# Patient Record
Sex: FEMALE | Race: BLACK OR AFRICAN AMERICAN | NOT HISPANIC OR LATINO | Employment: FULL TIME | URBAN - METROPOLITAN AREA
[De-identification: names, ages, dates, MRNs, and addresses within clinical notes are randomized per-mention and may not be internally consistent; named-entity substitution may affect disease eponyms.]

---

## 2017-03-25 ENCOUNTER — HOSPITAL ENCOUNTER (EMERGENCY)
Facility: HOSPITAL | Age: 22
Discharge: HOME | End: 2017-03-25
Payer: MEDICAID

## 2017-03-25 VITALS
WEIGHT: 140 LBS | HEIGHT: 65 IN | DIASTOLIC BLOOD PRESSURE: 79 MMHG | HEART RATE: 86 BPM | SYSTOLIC BLOOD PRESSURE: 130 MMHG | TEMPERATURE: 98 F | RESPIRATION RATE: 18 BRPM | BODY MASS INDEX: 23.32 KG/M2 | OXYGEN SATURATION: 94 %

## 2017-03-25 DIAGNOSIS — J40 BRONCHITIS: Primary | ICD-10-CM

## 2017-03-25 DIAGNOSIS — J02.9 PHARYNGITIS, UNSPECIFIED ETIOLOGY: ICD-10-CM

## 2017-03-25 DIAGNOSIS — J32.9 SINUSITIS, UNSPECIFIED CHRONICITY, UNSPECIFIED LOCATION: ICD-10-CM

## 2017-03-25 PROCEDURE — 99283 EMERGENCY DEPT VISIT LOW MDM: CPT

## 2017-03-25 NOTE — ED AVS SNAPSHOT
[facility] - [address] [address]               Marcella Duke   3/25/2017 10:54 PM   ED    Description:  Female : 1995   Department:  [facility]           Your Care was Coordinated By:     Provider Role From To    Juliano Murphy PA-C Physician Assistant [phone] --      Reason for Visit     Cough           Diagnoses this Visit        Comments    Bronchitis    -  Primary     Sinusitis, unspecified chronicity, unspecified location         Pharyngitis, unspecified etiology           ED Disposition     ED Disposition Condition Comment    Discharge             To Do List           Follow-up Information     Follow up with [facility].    Specialty:  Emergency Medicine    Why:  If symptoms worsen in any way. Take Azithromycin and Steroid as prescribed. Take Tylenol as directed for fever or pain. Follow up with your primary care physician in the next 2-3 days for re-evaluation and further management.     Contact information:    [address] [place] [id number] [phone]      [facility]     [facility] -  Assistance  Registered nurses in the [facility] provide clinical advisement, health education, appointment booking, and other advisory services.  Call for this free service at [phone].             Medications           Message regarding Medications     Verify the changes and/or additions to your medication regime listed below are the same as discussed with your clinician today.  If any of these changes or additions are incorrect, please notify your healthcare provider.             Verify that the below list of medications is an accurate representation of the medications you are currently taking.  If none reported, the list may be blank. If incorrect, please contact your healthcare provider. Carry this list with you in case of  "emergency.           Current Medications     albuterol 90 mcg/actuation inhaler Inhale 2 puffs into the lungs every 6 (six) hours as needed for Wheezing.    norelgestromin-ethinyl estradiol (ORTHO EVRA) 150-35 mcg/24 hr Place 1 patch onto the skin every 7 days.           Clinical Reference Information           Your Vitals Were     BP Pulse Temp Resp Height Weight    130/79 (BP Location: Right arm, Patient Position: Sitting) 86 97.9 °F (36.6 °C) (Oral) 18 5' 5" (1.651 m) 63.5 kg (140 lb)    Last Period SpO2 BMI          03/22/2017 (Exact Date) 94% 23.3 kg/m2        Allergies as of 3/25/2017     No Known Allergies      Immunizations Administered on Date of Encounter - 3/25/2017     None      ED Micro, Lab, POCT     None      ED Imaging Orders     None        Discharge Instructions         What Is Acute Bronchitis?  Acute or short-term bronchitis last for days or weeks. It occurs when the bronchial tubes (airways in the lungs) are irritated by a virus, bacteria, or allergen. This causes a cough that produces yellow or greenish mucus.  Inside healthy lungs    Air travels in and out of the lungs through the airways. The linings of these airways produce sticky mucus. This mucus traps particles that enter the lungs. Tiny structures called cilia then sweep the particles out of the airways.     Healthy airway: Airways are normally open. Air moves in and out easily.      Healthy cilia: Tiny, hairlike cilia sweep mucus and particles up and out of the airways.   Lungs with bronchitis  Bronchitis often occurs with a cold or the flu virus. The airways become inflamed (red and swollen). There is a deep hacking cough from the extra mucus. Other symptoms may include:  · Wheezing  · Chest discomfort  · Shortness of breath  · Mild fever  A second infection, this time due to bacteria, may then occur. And airways irritated by allergens or smoke are more likely to get infected.        Inflamed airway: Inflammation and extra mucus " narrow the airway, causing shortness of breath.      Impaired cilia: Extra mucus impairs cilia, causing congestion and wheezing. Smoking makes the problem worse.   Making a diagnosis  A physical exam, health history, and certain tests help your healthcare provider make the diagnosis.  Health history  Your healthcare provider will ask you about your symptoms.  The exam  Your provider listens to your chest for signs of congestion. He or she may also check your ears, nose, and throat.  Possible tests  · A sputum test for bacteria. This requires a sample of mucus from the lungs.  · A nasal or throat swab for bacterial infection.  · A chest X-ray if your healthcare provider thinks you have pneumonia.  · Tests to check for an underlying condition, such as allergies, asthma, or COPD. You may need to see a specialist for more lung function testing.  Treating a cough  The main treatment for bronchitis is easing symptoms. Avoiding smoke, allergens, and other things that trigger coughing can often help. If the infection is bacterial, you may be given antibiotics. During the illness, it's important to get plenty of sleep. To ease symptoms:  · Dont smoke, and avoid secondhand smoke.  · Use a humidifier, or breathe in steam from a hot shower. This may help loosen mucus.  · Drink a lot of water and juice. They can soothe the throat and may help thin mucus.  · Sit up or use extra pillows when in bed to help lessen coughing and congestion.  · Ask your provider about using cough medicine, pain and fever medicine, or a decongestant.  Antibiotics  Most cases of bronchitis are caused by cold or flu viruses. Antibiotics dont treat viral illness. Taking antibiotics when they are not needed increases your risk of getting an infection later that is antibiotic-resistant. Your provider will prescribe antibiotics if the infection is caused by bacteria. If they are prescribed:  · Take the medicine until it is used up, even if symptoms have  improved. If you dont, the bronchitis may come back.  · Take them as directed. For instance, some medicines should be taken with food.  · Ask your provider or pharmacist what side effects are common, and what to do about them.  Follow-up care  You should see your provider again in 2 to 3 weeks. By this time, symptoms should have improved. An infection that lasts longer may mean you have a more serious problem.  Prevention  · Avoid tobacco smoke. If you smoke, quit. Stay away from smoky places. Ask friends and family not to smoke around you, or in your home or car.  · Get checked for allergies.  · Ask your provider about getting a yearly flu shot, and pneumococcal or pneumonia shots.  · Wash your hands often. This helps reduce the chance of picking up viruses that cause colds and flu.  Call your healthcare provider if:  · Symptoms worsen, or new symptoms develop.  · Breathing problems worsen or  become severe.  · Symptoms dont get better within a week, or within 3 days of taking antibiotics.   Date Last Reviewed: 6/18/2014  © 5571-5550 [employer]. [address] [place] [address]. All rights reserved. This information is not intended as a substitute for professional medical care. Always follow your healthcare professional's instructions.          Self-Care for Sore Throats  Sore throats happen for many reasons, such as colds, allergies, and infections caused by viruses or bacteria. In any case, your throat becomes red and sore. Your goal for self-care is to reduce your discomfort while giving your throat a chance to heal.    Moisten and soothe your throat  Tips include the following:  · Try a sip of water first thing after waking up.  · Keep your throat moist by drinking 6 or more glasses of clear liquids every day.  · Run a cool-air humidifier in your room overnight.  · Avoid cigarette smoke.   · Suck on throat lozenges, cough drops, hard candy, ice chips, or frozen fruit-juice bars. Use  the sugar-free versions if your diet or medical condition requires them.  Gargle to ease irritation  Gargling every hour or 2 can ease irritation. Try gargling with 1 of these solutions:  · 1/4 teaspoon of salt in 1/2 cup of warm water  · An over-the-counter anesthetic gargle  Use medicine for more relief  Over-the-counter medicine can reduce sore throat symptoms. Ask your pharmacist if you have questions about which medicine to use:  · Ease pain with anesthetic sprays. Aspirin or an aspirin substitute also helps. Remember, never give aspirin to anyone 18 or younger, or if you are already taking blood thinners.   · For sore throats caused by allergies, try antihistamines to block the allergic reaction.  · Remember: unless a sore throat is caused by a bacterial infection, antibiotics wont help you.  Prevent future sore throats  Prevention tips include the following:  · Stop smoking or reduce contact with secondhand smoke. Smoke irritates the tender throat lining.  · Limit contact with pets and with allergy-causing substances, such as pollen and mold.  · When youre around someone with a sore throat or cold, wash your hands often to keep viruses or bacteria from spreading.  · Dont strain your vocal cords.  Call your healthcare provider  Contact your healthcare provider if you have:  · A temperature over 101°F (38.3°C)  · White spots on the throat  · Great difficulty swallowing  · Trouble breathing  · A skin rash  · Recent exposure to someone else with strep bacteria  · Severe hoarseness and swollen glands in the neck or jaw   Date Last Reviewed: 8/1/2016  © 8677-1793 [employer]. [address] [place] [address], [place] [address]. All rights reserved. This information is not intended as a substitute for professional medical care. Always follow your healthcare professional's instructions.          When You Have a Sore Throat  A sore throat can be painful. There are many reasons why you may have a sore throat.  Your healthcare provider will work with you to find the cause of your sore throat. He or she will also find the best treatment for you.      What causes a sore throat?  Sore throats can be caused or worsened by:  · Cold or flu viruses  · Bacteria  · Irritants such as tobacco smoke or air pollution  · Acid reflux  A healthy throat  The tonsils are on the sides of the throat near the base of the tongue. They collect viruses and bacteria and help fight infection. The throat (pharynx) is the passage for air. Mucus from the nasal cavity also moves down the passage.  An inflamed throat  The tonsils and pharynx can become inflamed due to a cold or flu virus. Postnasal drip (excess mucus draining from the nasal cavity) can irritate the throat. It can also make the throat or tonsils more likely to be infected by bacteria. Severe, untreated tonsillitis in children or adults can cause a pocket of pus (abscess) to form near the tonsil.  Your evaluation  A medical evaluation can help find the cause of your sore throat. It can also help your healthcare provider choose the best treatment for you. The evaluation may include a health history, physical exam, and diagnostic tests.  Health history  Your healthcare provider may ask you the following:  · How long has the sore throat lasted and how have you been treating it?  · Do you have any other symptoms, such as body aches, fever, or cough?  · Does your sore throat recur? If so, how often? How many days of school or work have you missed because of a sore throat?  · Do you have trouble eating or swallowing?  · Have you been told that you snore or have other sleep problems?  · Do you have bad breath?  · Do you cough up bad-tasting mucus?  Physical exam  During the exam, your healthcare provider checks your ears, nose, and throat for problems. He or she also checks for swelling in the neck, and may listen to your chest.  Possible tests  Other tests your healthcare provider may perform  "include:  · A throat swab to check for bacteria such as streptococcus (the bacteria that causes strep throat)  · A blood test to check for mononucleosis (a viral infection)  · A chest X-ray to rule out pneumonia, especially if you have a cough  Treating a sore throat  Treatment depends on many factors. What is the likely cause? Is the problem recent? Does it keep coming back? In many cases, the best thing to do is to treat the symptoms, rest, and let the problem heal itself. Antibiotics may help clear up some bacterial infections. For cases of severe or recurring tonsillitis, the tonsils may need to be removed.  Relieving your symptoms  · Dont smoke, and avoid secondhand smoke.  · For children, try throat sprays or Popsicles. Adults and older children may try lozenges.  · Drink warm liquids to soothe the throat and help thin mucus. Avoid alcohol, spicy foods, and acidic drinks such as orange juice. These can irritate the throat.  · Gargle with warm saltwater (1 teaspoon of salt to 8 ounces of warm water).  · Use a humidifier to keep air moist and relieve throat dryness.  · Try over-the-counter pain relievers such as acetaminophen or ibuprofen. Use as directed, and dont exceed the recommended dose. Dont give aspirin to children.   Are antibiotics needed?  If your sore throat is due to a bacterial infection, antibiotics may speed healing and prevent complications. Although group A streptococcus ("strep throat" or GAS) is the major treatable infection for a sore throat, GAS causes only 5% to 15% of sore throats in adults who seek medical care. Most sore throats are caused by cold or flu viruses. And antibiotics dont treat viral illness. In fact, using antibiotics when theyre not needed may produce bacteria that are harder to kill. Your healthcare provider will prescribe antibiotics only if he or she thinks they are likely to help.  If antibiotics are prescribed  Take the medicine exactly as directed. Be sure to " finish your prescription even if youre feeling better. And be sure to ask your healthcare provider or pharmacist what side effects are common and what to do about them.  Is surgery needed?  In some cases, tonsils need to be removed. This is often done as outpatient (same-day) surgery. Your healthcare provider may advise removing the tonsils in cases of:  · Several severe bouts of tonsillitis in a year. Severe episodes include those that lead to missed days of school or work, or that need to be treated with antibiotics.  · Tonsillitis that causes breathing problems during sleep  · Tonsillitis caused by food particles collecting in pouches in the tonsils (cryptic tonsillitis)  Call your healthcare provider if any of the following occur:  · Symptoms worsen, or new symptoms develop.  · Swollen tonsils make breathing difficult.  · The pain is severe enough to keep you from drinking liquids.  · A skin rash, hives, or wheezing develops. Any of these could signal an allergic reaction to antibiotics.  · Symptoms dont improve within a week.  · Symptoms dont improve within 2 to 3 days of starting antibiotics.   Date Last Reviewed: 10/1/2016  © 6488-0466 [employer]. [address] [place] [address]. All rights reserved. This information is not intended as a substitute for professional medical care. Always follow your healthcare professional's instructions.          Causes of Sinusitis    Mucus helps keep your sinuses clean. But mucus may build up in the sinuses because of colds, allergies, or blockages. These things get in the way of the natural drainage of mucus. This may lead to sinusitis. Sinusitis means sinus inflammation and infection.  · Acute sinusitis comes on suddenly. It often happens right after an upper respiratory infection, such as a cold. Viruses cause most acute sinus infections.  · Chronic sinusitis is ongoing swelling of the sinus lining. Doctors don't know what causes chronic  sinusitis.  Colds and other infections  A cold or flu may cause your sinus and nasal linings to swell. Sinus openings can become blocked. This causes mucus to back up. This backed-up mucus becomes an ideal place for bacteria to grow. Thick, yellow, or discolored mucus is one sign of infection.  Allergic reactions  You may be sensitive to certain substances. This causes the release of histamine in the body. Histamine makes your sinus and nasal linings swell. Long-term swelling clogs your sinuses. It prevents the tiny hairs (cilia) in the nasal lining from sweeping away mucus. Allergy symptoms can continue over time. But theyre less severe than with colds.    Blockages  · A polyp is a sac of swollen tissue. It can be the result of an allergy or infection. It may block the opening where most of your sinuses drain (middle meatus). It may even grow large enough to block your nose.  · A deviated septum is when the thin wall inside your nose is pushed to one side. It is often the result of injury. This can block your middle meatus.  People with chronic nasal problems or allergies are more likely to get acute sinusitis. Sinusitis is also more common if you have a weakened immune system, such as with HIV. You are also more likely to get sinusitis if you have cystic fibrosis or another condition that causes your body to make extra mucus.  Date Last Reviewed: 10/1/2016  © 6703-9817 [employer], [employer]. [address] [place] [address]. All rights reserved. This information is not intended as a substitute for professional medical care. Always follow your healthcare professional's instructions.           [facility] complies with applicable Federal civil rights laws and does not discriminate on the basis of race, color, national origin, age, disability, or sex.        Language Assistance Services     ATTENTION: Language assistance services are available, free of charge. Please call [phone].       ATENCIÓN: Si habla español, tiene a jolly disposición servicios gratuitos de asistencia lingüística. Llame al [phone].     CHÚ Ý: N?u b?n nói Ti?ng Vi?t, có các d?ch v? h? tr? ngôn ng? mi?n phí dành cho b?n. G?i s? [phone].

## 2017-03-26 NOTE — DISCHARGE INSTRUCTIONS
What Is Acute Bronchitis?  Acute or short-term bronchitis last for days or weeks. It occurs when the bronchial tubes (airways in the lungs) are irritated by a virus, bacteria, or allergen. This causes a cough that produces yellow or greenish mucus.  Inside healthy lungs    Air travels in and out of the lungs through the airways. The linings of these airways produce sticky mucus. This mucus traps particles that enter the lungs. Tiny structures called cilia then sweep the particles out of the airways.     Healthy airway: Airways are normally open. Air moves in and out easily.      Healthy cilia: Tiny, hairlike cilia sweep mucus and particles up and out of the airways.   Lungs with bronchitis  Bronchitis often occurs with a cold or the flu virus. The airways become inflamed (red and swollen). There is a deep hacking cough from the extra mucus. Other symptoms may include:  · Wheezing  · Chest discomfort  · Shortness of breath  · Mild fever  A second infection, this time due to bacteria, may then occur. And airways irritated by allergens or smoke are more likely to get infected.        Inflamed airway: Inflammation and extra mucus narrow the airway, causing shortness of breath.      Impaired cilia: Extra mucus impairs cilia, causing congestion and wheezing. Smoking makes the problem worse.   Making a diagnosis  A physical exam, health history, and certain tests help your healthcare provider make the diagnosis.  Health history  Your healthcare provider will ask you about your symptoms.  The exam  Your provider listens to your chest for signs of congestion. He or she may also check your ears, nose, and throat.  Possible tests  · A sputum test for bacteria. This requires a sample of mucus from the lungs.  · A nasal or throat swab for bacterial infection.  · A chest X-ray if your healthcare provider thinks you have pneumonia.  · Tests to check for an underlying condition, such as allergies, asthma, or COPD. You may need  to see a specialist for more lung function testing.  Treating a cough  The main treatment for bronchitis is easing symptoms. Avoiding smoke, allergens, and other things that trigger coughing can often help. If the infection is bacterial, you may be given antibiotics. During the illness, it's important to get plenty of sleep. To ease symptoms:  · Dont smoke, and avoid secondhand smoke.  · Use a humidifier, or breathe in steam from a hot shower. This may help loosen mucus.  · Drink a lot of water and juice. They can soothe the throat and may help thin mucus.  · Sit up or use extra pillows when in bed to help lessen coughing and congestion.  · Ask your provider about using cough medicine, pain and fever medicine, or a decongestant.  Antibiotics  Most cases of bronchitis are caused by cold or flu viruses. Antibiotics dont treat viral illness. Taking antibiotics when they are not needed increases your risk of getting an infection later that is antibiotic-resistant. Your provider will prescribe antibiotics if the infection is caused by bacteria. If they are prescribed:  · Take the medicine until it is used up, even if symptoms have improved. If you dont, the bronchitis may come back.  · Take them as directed. For instance, some medicines should be taken with food.  · Ask your provider or pharmacist what side effects are common, and what to do about them.  Follow-up care  You should see your provider again in 2 to 3 weeks. By this time, symptoms should have improved. An infection that lasts longer may mean you have a more serious problem.  Prevention  · Avoid tobacco smoke. If you smoke, quit. Stay away from smoky places. Ask friends and family not to smoke around you, or in your home or car.  · Get checked for allergies.  · Ask your provider about getting a yearly flu shot, and pneumococcal or pneumonia shots.  · Wash your hands often. This helps reduce the chance of picking up viruses that cause colds and flu.  Call  your healthcare provider if:  · Symptoms worsen, or new symptoms develop.  · Breathing problems worsen or  become severe.  · Symptoms dont get better within a week, or within 3 days of taking antibiotics.   Date Last Reviewed: 6/18/2014 © 2000-2016 [employer]. [address] [place] [address], [place] [address]. All rights reserved. This information is not intended as a substitute for professional medical care. Always follow your healthcare professional's instructions.          Self-Care for Sore Throats  Sore throats happen for many reasons, such as colds, allergies, and infections caused by viruses or bacteria. In any case, your throat becomes red and sore. Your goal for self-care is to reduce your discomfort while giving your throat a chance to heal.    Moisten and soothe your throat  Tips include the following:  · Try a sip of water first thing after waking up.  · Keep your throat moist by drinking 6 or more glasses of clear liquids every day.  · Run a cool-air humidifier in your room overnight.  · Avoid cigarette smoke.   · Suck on throat lozenges, cough drops, hard candy, ice chips, or frozen fruit-juice bars. Use the sugar-free versions if your diet or medical condition requires them.  Gargle to ease irritation  Gargling every hour or 2 can ease irritation. Try gargling with 1 of these solutions:  · 1/4 teaspoon of salt in 1/2 cup of warm water  · An over-the-counter anesthetic gargle  Use medicine for more relief  Over-the-counter medicine can reduce sore throat symptoms. Ask your pharmacist if you have questions about which medicine to use:  · Ease pain with anesthetic sprays. Aspirin or an aspirin substitute also helps. Remember, never give aspirin to anyone 18 or younger, or if you are already taking blood thinners.   · For sore throats caused by allergies, try antihistamines to block the allergic reaction.  · Remember: unless a sore throat is caused by a bacterial infection, antibiotics wont help  you.  Prevent future sore throats  Prevention tips include the following:  · Stop smoking or reduce contact with secondhand smoke. Smoke irritates the tender throat lining.  · Limit contact with pets and with allergy-causing substances, such as pollen and mold.  · When youre around someone with a sore throat or cold, wash your hands often to keep viruses or bacteria from spreading.  · Dont strain your vocal cords.  Call your healthcare provider  Contact your healthcare provider if you have:  · A temperature over 101°F (38.3°C)  · White spots on the throat  · Great difficulty swallowing  · Trouble breathing  · A skin rash  · Recent exposure to someone else with strep bacteria  · Severe hoarseness and swollen glands in the neck or jaw   Date Last Reviewed: 8/1/2016 © 2000-2016 [employer]. [address] [place] [address], [place] [address]. All rights reserved. This information is not intended as a substitute for professional medical care. Always follow your healthcare professional's instructions.          When You Have a Sore Throat  A sore throat can be painful. There are many reasons why you may have a sore throat. Your healthcare provider will work with you to find the cause of your sore throat. He or she will also find the best treatment for you.      What causes a sore throat?  Sore throats can be caused or worsened by:  · Cold or flu viruses  · Bacteria  · Irritants such as tobacco smoke or air pollution  · Acid reflux  A healthy throat  The tonsils are on the sides of the throat near the base of the tongue. They collect viruses and bacteria and help fight infection. The throat (pharynx) is the passage for air. Mucus from the nasal cavity also moves down the passage.  An inflamed throat  The tonsils and pharynx can become inflamed due to a cold or flu virus. Postnasal drip (excess mucus draining from the nasal cavity) can irritate the throat. It can also make the throat or tonsils more likely to be  infected by bacteria. Severe, untreated tonsillitis in children or adults can cause a pocket of pus (abscess) to form near the tonsil.  Your evaluation  A medical evaluation can help find the cause of your sore throat. It can also help your healthcare provider choose the best treatment for you. The evaluation may include a health history, physical exam, and diagnostic tests.  Health history  Your healthcare provider may ask you the following:  · How long has the sore throat lasted and how have you been treating it?  · Do you have any other symptoms, such as body aches, fever, or cough?  · Does your sore throat recur? If so, how often? How many days of school or work have you missed because of a sore throat?  · Do you have trouble eating or swallowing?  · Have you been told that you snore or have other sleep problems?  · Do you have bad breath?  · Do you cough up bad-tasting mucus?  Physical exam  During the exam, your healthcare provider checks your ears, nose, and throat for problems. He or she also checks for swelling in the neck, and may listen to your chest.  Possible tests  Other tests your healthcare provider may perform include:  · A throat swab to check for bacteria such as streptococcus (the bacteria that causes strep throat)  · A blood test to check for mononucleosis (a viral infection)  · A chest X-ray to rule out pneumonia, especially if you have a cough  Treating a sore throat  Treatment depends on many factors. What is the likely cause? Is the problem recent? Does it keep coming back? In many cases, the best thing to do is to treat the symptoms, rest, and let the problem heal itself. Antibiotics may help clear up some bacterial infections. For cases of severe or recurring tonsillitis, the tonsils may need to be removed.  Relieving your symptoms  · Dont smoke, and avoid secondhand smoke.  · For children, try throat sprays or Popsicles. Adults and older children may try lozenges.  · Drink warm liquids  "to soothe the throat and help thin mucus. Avoid alcohol, spicy foods, and acidic drinks such as orange juice. These can irritate the throat.  · Gargle with warm saltwater (1 teaspoon of salt to 8 ounces of warm water).  · Use a humidifier to keep air moist and relieve throat dryness.  · Try over-the-counter pain relievers such as acetaminophen or ibuprofen. Use as directed, and dont exceed the recommended dose. Dont give aspirin to children.   Are antibiotics needed?  If your sore throat is due to a bacterial infection, antibiotics may speed healing and prevent complications. Although group A streptococcus ("strep throat" or GAS) is the major treatable infection for a sore throat, GAS causes only 5% to 15% of sore throats in adults who seek medical care. Most sore throats are caused by cold or flu viruses. And antibiotics dont treat viral illness. In fact, using antibiotics when theyre not needed may produce bacteria that are harder to kill. Your healthcare provider will prescribe antibiotics only if he or she thinks they are likely to help.  If antibiotics are prescribed  Take the medicine exactly as directed. Be sure to finish your prescription even if youre feeling better. And be sure to ask your healthcare provider or pharmacist what side effects are common and what to do about them.  Is surgery needed?  In some cases, tonsils need to be removed. This is often done as outpatient (same-day) surgery. Your healthcare provider may advise removing the tonsils in cases of:  · Several severe bouts of tonsillitis in a year. Severe episodes include those that lead to missed days of school or work, or that need to be treated with antibiotics.  · Tonsillitis that causes breathing problems during sleep  · Tonsillitis caused by food particles collecting in pouches in the tonsils (cryptic tonsillitis)  Call your healthcare provider if any of the following occur:  · Symptoms worsen, or new symptoms develop.  · Swollen " tonsils make breathing difficult.  · The pain is severe enough to keep you from drinking liquids.  · A skin rash, hives, or wheezing develops. Any of these could signal an allergic reaction to antibiotics.  · Symptoms dont improve within a week.  · Symptoms dont improve within 2 to 3 days of starting antibiotics.   Date Last Reviewed: 10/1/2016  © 6932-5074 [employer]. [address] [place] [address], [place] [address]. All rights reserved. This information is not intended as a substitute for professional medical care. Always follow your healthcare professional's instructions.          Causes of Sinusitis    Mucus helps keep your sinuses clean. But mucus may build up in the sinuses because of colds, allergies, or blockages. These things get in the way of the natural drainage of mucus. This may lead to sinusitis. Sinusitis means sinus inflammation and infection.  · Acute sinusitis comes on suddenly. It often happens right after an upper respiratory infection, such as a cold. Viruses cause most acute sinus infections.  · Chronic sinusitis is ongoing swelling of the sinus lining. Doctors don't know what causes chronic sinusitis.  Colds and other infections  A cold or flu may cause your sinus and nasal linings to swell. Sinus openings can become blocked. This causes mucus to back up. This backed-up mucus becomes an ideal place for bacteria to grow. Thick, yellow, or discolored mucus is one sign of infection.  Allergic reactions  You may be sensitive to certain substances. This causes the release of histamine in the body. Histamine makes your sinus and nasal linings swell. Long-term swelling clogs your sinuses. It prevents the tiny hairs (cilia) in the nasal lining from sweeping away mucus. Allergy symptoms can continue over time. But theyre less severe than with colds.    Blockages  · A polyp is a sac of swollen tissue. It can be the result of an allergy or infection. It may block the opening where most of your  sinuses drain (middle meatus). It may even grow large enough to block your nose.  · A deviated septum is when the thin wall inside your nose is pushed to one side. It is often the result of injury. This can block your middle meatus.  People with chronic nasal problems or allergies are more likely to get acute sinusitis. Sinusitis is also more common if you have a weakened immune system, such as with HIV. You are also more likely to get sinusitis if you have cystic fibrosis or another condition that causes your body to make extra mucus.  Date Last Reviewed: 10/1/2016  © 9531-3456 [employer]. [address] [place] [address], [place] [address]. All rights reserved. This information is not intended as a substitute for professional medical care. Always follow your healthcare professional's instructions.

## 2017-03-26 NOTE — ED PROVIDER NOTES
SCRIBE #1 NOTE: I, Esmer Latham, am scribing for, and in the presence of, DONY Malloy. I have scribed the entire note.      History      Chief Complaint   Patient presents with    Cough     sore throat and nasal congestion       Review of patient's allergies indicates:  No Known Allergies     HPI   HPI    3/25/2017, 11:03 PM   History obtained from the patient      History of Present Illness: Marcella Duke is a 21 y.o. female patient who presents to the Emergency Department for for cough, sore throat, and nasal congestion onset a week ago. Pt reports she was prescribed azithromycin but never took the medication.       Arrival mode: Personal vehicle    PCP: Primary Doctor No       Past Medical History:  Past Medical History:   Diagnosis Date    Asthma     Hypertension affecting pregnancy in third trimester, antepartum 2016       Past Surgical History:  Past Surgical History:   Procedure Laterality Date    none           Family History:  Family History   Problem Relation Age of Onset    Stroke Paternal Grandmother     Hypertension Maternal Grandmother     Breast cancer Neg Hx     Cancer Neg Hx     Colon cancer Neg Hx     Diabetes Neg Hx     Eclampsia Neg Hx     Miscarriages / Stillbirths Neg Hx     Ovarian cancer Neg Hx      labor Neg Hx        Social History:  Social History     Social History Main Topics    Smoking status: Never Smoker    Smokeless tobacco: Not on file    Alcohol use No    Drug use: No    Sexual activity: Yes     Partners: Male       ROS   Review of Systems   Constitutional: Negative for fever.   HENT: Positive for congestion and sore throat.    Respiratory: Positive for cough. Negative for shortness of breath.    Cardiovascular: Negative for chest pain.   Gastrointestinal: Negative for nausea.   Genitourinary: Negative for dysuria.   Musculoskeletal: Negative for back pain.   Skin: Negative for rash.   Neurological: Negative for weakness.   Hematological:  "Does not bruise/bleed easily.   All other systems reviewed and are negative.      Physical Exam    Initial Vitals   BP Pulse Resp Temp SpO2   03/25/17 2228 03/25/17 2228 03/25/17 2228 03/25/17 2228 03/25/17 2228   130/79 86 18 97.9 °F (36.6 °C) 94 %      Physical Exam  Nursing Notes and Vital Signs Reviewed.  Constitutional: Patient is in no acute distress. Awake and alert. Well-developed and well-nourished.  Head: Atraumatic. Normocephalic.  Eyes: PERRL. EOM intact. Conjunctivae are not pale. No scleral icterus.  ENT: Swollen nasal mucosa. Oropharynx is clear and symmetric. Nasal congestion. Rhinorrhea. Occasional cough.     Neck: Supple. Full ROM. No lymphadenopathy.  Cardiovascular: Regular rate. Regular rhythm. No murmurs, rubs, or gallops.  Pulmonary/Chest: No respiratory distress. Clear to auscultation bilaterally. No wheezing, rales, or rhonchi.  Abdominal: Soft and non-distended.  There is no tenderness.  No rebound, guarding, or rigidity. Good bowel sounds.  Musculoskeletal: Moves all extremities. No obvious deformities. No edema.   Skin: Warm and dry.  Neurological:  Alert, awake, and appropriate.  Normal speech.  No acute focal neurological deficits are appreciated.  Psychiatric: Normal affect. Good eye contact. Appropriate in content.    ED Course    Procedures  ED Vital Signs:  Vitals:    03/25/17 2228   BP: 130/79   Pulse: 86   Resp: 18   Temp: 97.9 °F (36.6 °C)   TempSrc: Oral   SpO2: (!) 94%   Weight: 63.5 kg (140 lb)   Height: 5' 5" (1.651 m)              The Emergency Provider reviewed the vital signs and test results, which are outlined above.    ED Discussion     11:25 PM: Discussed with pt all pertinent ED information and results. Discussed pt dx and plan of tx. Gave pt all f/u and return to the ED instructions. All questions and concerns were addressed at this time. Pt expresses understanding of information and instructions, and is comfortable with plan to discharge. Pt is stable for " discharge.      ED Medication(s):  Medications - No data to display    New Prescriptions    No medications on file       Follow-up Information     Follow up with [facility].    Specialty:  Emergency Medicine    Why:  If symptoms worsen in any way. Take Azithromycin and Steroid as prescribed. Take Tylenol as directed for fever or pain. Follow up with your primary care physician in the next 2-3 days for re-evaluation and further management.     Contact information:    [address] [facility] [address] [phone]            Medical Decision Making              Scribe Attestation:   Scribe #1: I performed the above scribed service and the documentation accurately describes the services I performed. I attest to the accuracy of the note.    Attending:   Physician Attestation Statement for Scribe #1: I, DONY Malloy, personally performed the services described in this documentation, as scribed by Esmer Latham, in my presence, and it is both accurate and complete.          Clinical Impression       ICD-10-CM ICD-9-CM   1. Bronchitis J40 490   2. Sinusitis, unspecified chronicity, unspecified location J32.9 473.9   3. Pharyngitis, unspecified etiology J02.9 462       Disposition:   Disposition: Discharged  Condition: Stable         Juliano Murphy PA-C  03/25/17 6441

## 2017-04-30 ENCOUNTER — NURSE TRIAGE (OUTPATIENT)
Dept: ADMINISTRATIVE | Facility: CLINIC | Age: 22
End: 2017-04-30

## 2017-04-30 NOTE — TELEPHONE ENCOUNTER
Reason for Disposition   Chemical burn   [1] Looks infected (spreading redness, pus) AND [2] no fever    Protocols used: ST SKIN INJURY-A-AH, ST BURNS - CHEMICAL-A-AH

## 2017-05-01 ENCOUNTER — OFFICE VISIT (OUTPATIENT)
Dept: OBSTETRICS AND GYNECOLOGY | Facility: CLINIC | Age: 22
End: 2017-05-01
Payer: MEDICAID

## 2017-05-01 ENCOUNTER — LAB VISIT (OUTPATIENT)
Dept: LAB | Facility: HOSPITAL | Age: 22
End: 2017-05-01
Attending: OBSTETRICS & GYNECOLOGY
Payer: MEDICAID

## 2017-05-01 VITALS
DIASTOLIC BLOOD PRESSURE: 60 MMHG | WEIGHT: 130.75 LBS | BODY MASS INDEX: 21.79 KG/M2 | SYSTOLIC BLOOD PRESSURE: 112 MMHG | HEIGHT: 65 IN

## 2017-05-01 DIAGNOSIS — Z11.3 SCREEN FOR STD (SEXUALLY TRANSMITTED DISEASE): ICD-10-CM

## 2017-05-01 DIAGNOSIS — L73.9 FOLLICULITIS: ICD-10-CM

## 2017-05-01 DIAGNOSIS — Z11.3 SCREEN FOR STD (SEXUALLY TRANSMITTED DISEASE): Primary | ICD-10-CM

## 2017-05-01 DIAGNOSIS — A60.00 HERPES SIMPLEX INFECTION OF GENITOURINARY SYSTEM: ICD-10-CM

## 2017-05-01 DIAGNOSIS — N89.8 VAGINAL DISCHARGE: ICD-10-CM

## 2017-05-01 PROCEDURE — 86695 HERPES SIMPLEX TYPE 1 TEST: CPT | Mod: 59

## 2017-05-01 PROCEDURE — 86696 HERPES SIMPLEX TYPE 2 TEST: CPT | Mod: 59

## 2017-05-01 PROCEDURE — 99213 OFFICE O/P EST LOW 20 MIN: CPT | Mod: S$PBB,,, | Performed by: OBSTETRICS & GYNECOLOGY

## 2017-05-01 PROCEDURE — 80074 ACUTE HEPATITIS PANEL: CPT

## 2017-05-01 PROCEDURE — 36415 COLL VENOUS BLD VENIPUNCTURE: CPT

## 2017-05-01 PROCEDURE — 86695 HERPES SIMPLEX TYPE 1 TEST: CPT

## 2017-05-01 PROCEDURE — 86703 HIV-1/HIV-2 1 RESULT ANTBDY: CPT

## 2017-05-01 PROCEDURE — 86592 SYPHILIS TEST NON-TREP QUAL: CPT

## 2017-05-01 PROCEDURE — 99999 PR PBB SHADOW E&M-EST. PATIENT-LVL II: CPT | Mod: PBBFAC,,, | Performed by: OBSTETRICS & GYNECOLOGY

## 2017-05-01 RX ORDER — VALACYCLOVIR HYDROCHLORIDE 1 G/1
1000 TABLET, FILM COATED ORAL EVERY 12 HOURS
Qty: 20 TABLET | Refills: 0 | Status: SHIPPED | OUTPATIENT
Start: 2017-05-01 | End: 2017-05-11

## 2017-05-01 RX ORDER — ALBUTEROL SULFATE 90 UG/1
AEROSOL, METERED RESPIRATORY (INHALATION)
Qty: 18 G | Refills: 0 | Status: SHIPPED | OUTPATIENT
Start: 2017-05-01

## 2017-05-01 NOTE — PROGRESS NOTES
Subjective:       Patient ID: Marcella Duke is a 21 y.o. female.    Chief Complaint:  Folliculitis, std screening    History of Present Illness  HPI  22 yo  presents c/o 1 week h/o of genital sores/folliculitis. Would like STD screening w/ HSV blood work. Unsure if lesions have drained anything but does report improvement in pain    GYN & OB History  No LMP recorded.   Date of Last Pap: 2016    OB History    Para Term  AB SAB TAB Ectopic Multiple Living   2 1 1  1 1   0 1      # Outcome Date GA Lbr Jeb/2nd Weight Sex Delivery Anes PTL Lv   2 Term 16 38w6d / 00:11 2.8 kg (6 lb 2.8 oz) F Vag-Spont EPI N Y   1 SAB                   Review of Systems  Review of Systems   All other systems reviewed and are negative.       Objective:    Physical Exam:   Constitutional: She is oriented to person, place, and time. She appears well-developed and well-nourished.        Pulmonary/Chest: Effort normal.          Genitourinary:   Genitourinary Comments: Scattered superficial ulcerations bilateral labia majora & minora. Mons with few areas of healed folliculitis. Normal vaginal mucosa & cervix. (+) yellowish green vaginal discharge, cervical bleeding with qtip           Musculoskeletal: Normal range of motion.       Neurological: She is alert and oriented to person, place, and time.    Skin: Skin is warm and dry.    Psychiatric: She has a normal mood and affect. Her behavior is normal.       Wet prep (+) wbcs     Assessment:        1. Screen for STD (sexually transmitted disease)    2. Folliculitis    3. Herpes simplex infection of genitourinary system    4. Vaginal discharge       Plan:      1. RPR, HSV culture, HSV IgG/IgM, HIV, brayan/chlamydia, hep panel, wet prep  2. Valtrex x 10days; suppressive tx discussed

## 2017-05-02 ENCOUNTER — PATIENT MESSAGE (OUTPATIENT)
Dept: OBSTETRICS AND GYNECOLOGY | Facility: CLINIC | Age: 22
End: 2017-05-02

## 2017-05-02 LAB
C TRACH DNA SPEC QL NAA+PROBE: DETECTED
HAV IGM SERPL QL IA: NEGATIVE
HBV CORE IGM SERPL QL IA: NEGATIVE
HBV SURFACE AG SERPL QL IA: NEGATIVE
HCV AB SERPL QL IA: NEGATIVE
HIV 1+2 AB+HIV1 P24 AG SERPL QL IA: NEGATIVE
N GONORRHOEA DNA SPEC QL NAA+PROBE: DETECTED
RPR SER QL: NORMAL

## 2017-05-02 RX ORDER — CEFTRIAXONE 250 MG/1
250 INJECTION, POWDER, FOR SOLUTION INTRAMUSCULAR; INTRAVENOUS
Status: COMPLETED | OUTPATIENT
Start: 2017-05-03 | End: 2017-05-03

## 2017-05-02 RX ORDER — AZITHROMYCIN 500 MG/1
TABLET, FILM COATED ORAL
Qty: 2 TABLET | Refills: 0 | Status: SHIPPED | OUTPATIENT
Start: 2017-05-02 | End: 2019-04-03

## 2017-05-02 NOTE — TELEPHONE ENCOUNTER
Emailed pt about (+) gonorrhea/chlamydia. zithromax escripted but not able to put in rocephin order at this time.

## 2017-05-03 ENCOUNTER — PATIENT MESSAGE (OUTPATIENT)
Dept: OBSTETRICS AND GYNECOLOGY | Facility: CLINIC | Age: 22
End: 2017-05-03

## 2017-05-03 ENCOUNTER — CLINICAL SUPPORT (OUTPATIENT)
Dept: OBSTETRICS AND GYNECOLOGY | Facility: CLINIC | Age: 22
End: 2017-05-03
Payer: MEDICAID

## 2017-05-03 LAB
HSV PCR SPECIMEN SOURCE: ABNORMAL
HSV1 IGG SERPL QL IA: POSITIVE
HSV1 PCR RESULT: DETECTED
HSV2 IGG SERPL QL IA: NEGATIVE
HSV2 PCR RESULT: NOT DETECTED

## 2017-05-03 PROCEDURE — 96372 THER/PROPH/DIAG INJ SC/IM: CPT | Mod: PBBFAC

## 2017-05-03 RX ADMIN — CEFTRIAXONE SODIUM 250 MG: 250 INJECTION, POWDER, FOR SOLUTION INTRAMUSCULAR; INTRAVENOUS at 02:05

## 2017-05-03 NOTE — PROGRESS NOTES
Identified patient using two patient identifiers. Allergies verified with patient. Ceftriaxone 250mg IM injection given in left ventrogluteal. Patient tolerated well and was advised to remain in the office for 15 minutes. Patient verbalized understanding.

## 2017-05-04 RX ORDER — VALACYCLOVIR HYDROCHLORIDE 1 G/1
1000 TABLET, FILM COATED ORAL DAILY
Qty: 30 TABLET | Refills: 11 | Status: SHIPPED | OUTPATIENT
Start: 2017-05-04 | End: 2019-04-03

## 2017-05-08 LAB
HSV1 IGM SER QL IF: ABNORMAL
HSV1+2 IGM SER IA-ACNC: 3.43 INDEX
HSV2 IGM SER QL IF: ABNORMAL

## 2017-08-11 NOTE — TELEPHONE ENCOUNTER
----- Message from Candie Velázquez sent at 8/11/2017 11:51 AM CDT -----  Contact: [phone]  Patient would like refills on her birth control. Please call back at [phone].    [facility] [employer] [address] [place] [address] [id number] [address]  Phone: [phone] Fax: [phone]    Thanks,  Candie Velázquez

## 2017-08-12 RX ORDER — NORELGESTROMIN AND ETHINYL ESTRADIOL 150; 35 UG/D; UG/D
PATCH TRANSDERMAL
Qty: 3 PATCH | Refills: 3 | Status: SHIPPED | OUTPATIENT
Start: 2017-08-12 | End: 2019-04-03

## 2017-08-14 ENCOUNTER — PATIENT MESSAGE (OUTPATIENT)
Dept: OBSTETRICS AND GYNECOLOGY | Facility: CLINIC | Age: 22
End: 2017-08-14

## 2018-04-11 ENCOUNTER — HOSPITAL ENCOUNTER (EMERGENCY)
Facility: HOSPITAL | Age: 23
Discharge: HOME | End: 2018-04-11
Attending: EMERGENCY MEDICINE

## 2018-04-11 VITALS
OXYGEN SATURATION: 99 % | DIASTOLIC BLOOD PRESSURE: 78 MMHG | SYSTOLIC BLOOD PRESSURE: 147 MMHG | TEMPERATURE: 98 F | HEART RATE: 84 BPM | RESPIRATION RATE: 18 BRPM | BODY MASS INDEX: 21.36 KG/M2 | WEIGHT: 128.19 LBS | HEIGHT: 65 IN

## 2018-04-11 DIAGNOSIS — R05.9 COUGH: ICD-10-CM

## 2018-04-11 DIAGNOSIS — M94.0 COSTOCHONDRITIS: Primary | ICD-10-CM

## 2018-04-11 DIAGNOSIS — J40 BRONCHITIS: ICD-10-CM

## 2018-04-11 PROCEDURE — 99284 EMERGENCY DEPT VISIT MOD MDM: CPT

## 2018-04-11 PROCEDURE — 25000003 PHARM REV CODE 250: Performed by: NURSE PRACTITIONER

## 2018-04-11 RX ORDER — DEXAMETHASONE 4 MG/1
4 TABLET ORAL DAILY
Qty: 5 TABLET | Refills: 0 | Status: SHIPPED | OUTPATIENT
Start: 2018-04-11 | End: 2018-04-16

## 2018-04-11 RX ORDER — IBUPROFEN 800 MG/1
800 TABLET ORAL
Status: DISCONTINUED | OUTPATIENT
Start: 2018-04-11 | End: 2018-04-12 | Stop reason: HOSPADM

## 2018-04-11 RX ORDER — DICLOFENAC SODIUM 50 MG/1
50 TABLET, DELAYED RELEASE ORAL 3 TIMES DAILY PRN
Qty: 20 TABLET | Refills: 0 | Status: SHIPPED | OUTPATIENT
Start: 2018-04-11 | End: 2019-04-03

## 2018-04-11 RX ORDER — PROMETHAZINE HYDROCHLORIDE AND DEXTROMETHORPHAN HYDROBROMIDE 6.25; 15 MG/5ML; MG/5ML
5 SYRUP ORAL 3 TIMES DAILY PRN
Qty: 120 ML | Refills: 0 | Status: SHIPPED | OUTPATIENT
Start: 2018-04-11 | End: 2018-04-21

## 2018-04-12 NOTE — ED NOTES
Patient examined, evaluated, and educated on discharge prescriptions and instructions by NP. Patient discharged to lobby by NP.

## 2018-04-12 NOTE — ED PROVIDER NOTES
SCRIBE #1 NOTE: I, Hernandez Jasso, am scribing for, and in the presence of, Matt Ji NP. I have scribed the entire note.      History      Chief Complaint   Patient presents with    Rib pain     R rib area pain. worsens with movement and deep breathing       Review of patient's allergies indicates:  No Known Allergies     HPI   HPI    2018, 9:39 PM   History obtained from the patient      History of Present Illness: Marcella Duke is a 22 y.o. female patient who presents to the Emergency Department for right rib pain which onset gradually today. Sxs are constant and moderate in severity. Pt reports she has been coughing for the last 2 months. There are no mitigating or exacerbating factors noted. Pt denies any fever, N/V/D, chest pain, wheezing, SOB, ABD pain, sore throat, back pain, and all other sxs at this time. No further complaints or concerns at this time.         Arrival mode: Personal vehicle      PCP: Primary Doctor No       Past Medical History:  Past Medical History:   Diagnosis Date    Asthma     Hypertension affecting pregnancy in third trimester, antepartum 2016       Past Surgical History:  Past Surgical History:   Procedure Laterality Date    none           Family History:  Family History   Problem Relation Age of Onset    Stroke Paternal Grandmother     Hypertension Maternal Grandmother     Breast cancer Neg Hx     Cancer Neg Hx     Colon cancer Neg Hx     Diabetes Neg Hx     Eclampsia Neg Hx     Miscarriages / Stillbirths Neg Hx     Ovarian cancer Neg Hx      labor Neg Hx        Social History:  Social History     Social History Main Topics    Smoking status: Never Smoker    Smokeless tobacco: Never Used    Alcohol use Yes      Comment: occ    Drug use: No    Sexual activity: Yes     Partners: Male       ROS   Review of Systems   Constitutional: Negative for fever.   HENT: Negative for sore throat.    Respiratory: Positive for cough. Negative for  "shortness of breath.    Cardiovascular: Negative for chest pain.   Gastrointestinal: Negative for abdominal pain, constipation, diarrhea, nausea and vomiting.   Genitourinary: Negative for dysuria.   Musculoskeletal: Negative for back pain and neck pain.        (+) R rib pain   Skin: Negative for rash.   Neurological: Negative for weakness.   Hematological: Does not bruise/bleed easily.     Physical Exam      Initial Vitals [04/11/18 2054]   BP Pulse Resp Temp SpO2   (!) 147/78 84 18 97.9 °F (36.6 °C) 99 %      MAP       101          Physical Exam  Nursing Notes and Vital Signs Reviewed.  Constitutional: Patient is in no acute distress. Well-developed and well-nourished.  Head: Atraumatic. Normocephalic.  Eyes: PERRL. EOM intact. Conjunctivae are not pale. No scleral icterus.  ENT: Mucous membranes are moist. Oropharynx is clear and symmetric.    Neck: Supple. Full ROM. No lymphadenopathy.  Cardiovascular: Regular rate. Regular rhythm. No murmurs, rubs, or gallops. Distal pulses are 2+ and symmetric.  Pulmonary/Chest: No respiratory distress. Clear to auscultation bilaterally. No wheezing or rales.  Abdominal: Soft and non-distended.  There is no tenderness.  No rebound, guarding, or rigidity. Good bowel sounds.  Genitourinary: No CVA tenderness  Musculoskeletal: Moves all extremities. No obvious deformities. No edema. No calf tenderness. Right ribs TTP.  Skin: Warm and dry.  Neurological:  Alert, awake, and appropriate.  Normal speech.  No acute focal neurological deficits are appreciated.  Psychiatric: Normal affect. Good eye contact. Appropriate in content.    ED Course    Procedures  ED Vital Signs:  Vitals:    04/11/18 2054   BP: (!) 147/78   Pulse: 84   Resp: 18   Temp: 97.9 °F (36.6 °C)   TempSrc: Oral   SpO2: 99%   Weight: 58.2 kg (128 lb 3.2 oz)   Height: 5' 5" (1.651 m)       Abnormal Lab Results:  Labs Reviewed - No data to display     All Lab Results:    Imaging Results:  Imaging Results          X-Ray " Chest PA And Lateral (Final result)  Result time 04/11/18 22:14:28    Final result by Kashif Sandoval MD (Timothy) (04/11/18 22:14:28)                 Impression:         Normal sized heart. Clear lungs.Scoliosis thoracolumbar spine.      Electronically signed by: KASHIF SANDOVAL MD  Date:     04/11/18  Time:    22:14              Narrative:    CXR, 2 views    Clinical History:    Cough                                      The Emergency Provider reviewed the vital signs and test results, which are outlined above.    ED Discussion     Reevaluation: The patient feels better and is resting comfortably. Pt states symptoms are improved. Discussed test results, shared treatment plan, specific conditions for return, and the importance of follow up. Pt feels comfortable with the plan as discussed. Answered questions at this time. Patient has remained hemodynamically stable throughout ED course and is stable for discharge.       ED Medication(s):  Medications   ibuprofen tablet 800 mg (800 mg Oral Not Given 4/11/18 2638)       Discharge Medication List as of 4/11/2018 10:28 PM      START taking these medications    Details   dexamethasone (DECADRON) 4 MG Tab Take 1 tablet (4 mg total) by mouth once daily., Starting Wed 4/11/2018, Until Mon 4/16/2018, Print      diclofenac (VOLTAREN) 50 MG EC tablet Take 1 tablet (50 mg total) by mouth 3 (three) times daily as needed., Starting Wed 4/11/2018, Print      promethazine-dextromethorphan (PROMETHAZINE-DM) 6.25-15 mg/5 mL Syrp Take 5 mLs by mouth 3 (three) times daily as needed., Starting Wed 4/11/2018, Until Sat 4/21/2018, Print             Follow-up Information     [facility]    Specialty:  Emergency Medicine  Why:  As needed, If symptoms worsen  Contact information:  [address]  [facility] [address] [phone]                   Medical Decision Making              Scribe Attestation:   Scribe #1: I performed the above scribed  service and the documentation accurately describes the services I performed. I attest to the accuracy of the note.    Attending:   Physician Attestation Statement for Scribe #1: I, Matt Ji NP, personally performed the services described in this documentation, as scribed by Hernandez Jasso, in my presence, and it is both accurate and complete.          Clinical Impression       ICD-10-CM ICD-9-CM   1. Costochondritis M94.0 733.6   2. Cough R05 786.2   3. Bronchitis J40 490               Matt Ji NP  04/12/18 0036

## 2019-04-03 ENCOUNTER — OFFICE VISIT (OUTPATIENT)
Dept: URGENT CARE | Facility: CLINIC | Age: 24
End: 2019-04-03
Payer: MEDICAID

## 2019-04-03 VITALS
SYSTOLIC BLOOD PRESSURE: 106 MMHG | HEIGHT: 65 IN | OXYGEN SATURATION: 99 % | HEART RATE: 76 BPM | BODY MASS INDEX: 23.76 KG/M2 | TEMPERATURE: 98 F | WEIGHT: 142.63 LBS | DIASTOLIC BLOOD PRESSURE: 68 MMHG

## 2019-04-03 DIAGNOSIS — J45.909 ASTHMA, UNSPECIFIED ASTHMA SEVERITY, UNSPECIFIED WHETHER COMPLICATED, UNSPECIFIED WHETHER PERSISTENT: Primary | ICD-10-CM

## 2019-04-03 DIAGNOSIS — Z3A.13 13 WEEKS GESTATION OF PREGNANCY: ICD-10-CM

## 2019-04-03 PROCEDURE — 99203 OFFICE O/P NEW LOW 30 MIN: CPT | Mod: S$PBB,,, | Performed by: FAMILY MEDICINE

## 2019-04-03 PROCEDURE — 99203 PR OFFICE/OUTPT VISIT, NEW, LEVL III, 30-44 MIN: ICD-10-PCS | Mod: S$PBB,,, | Performed by: FAMILY MEDICINE

## 2019-04-03 PROCEDURE — 99999 PR PBB SHADOW E&M-EST. PATIENT-LVL III: CPT | Mod: PBBFAC,,, | Performed by: FAMILY MEDICINE

## 2019-04-03 PROCEDURE — 99999 PR PBB SHADOW E&M-EST. PATIENT-LVL III: ICD-10-PCS | Mod: PBBFAC,,, | Performed by: FAMILY MEDICINE

## 2019-04-03 PROCEDURE — 99213 OFFICE O/P EST LOW 20 MIN: CPT | Mod: PBBFAC,PN | Performed by: FAMILY MEDICINE

## 2019-04-03 NOTE — PATIENT INSTRUCTIONS
1. Rx Pulmicort one puff every 12 hours. Ask your OB doctor if refill is needed  2. Continue your ventolin as a rescue inhaler 2 puffs every 4-6 hours for shortness of breath/wheezing  3. OTC claritn daily. OTC flonase daily  4. Keep your OB appt 4/17. You may communicate with OB clinic through patient portal  5. Work excuse tomorrow

## 2019-04-03 NOTE — PROGRESS NOTES
"Subjective:       Patient ID: Marcella Duke is a 23 y.o. female.    Chief Complaint: Shortness of Breath    /68 (BP Location: Right arm, Patient Position: Sitting, BP Method: Medium (Manual))   Pulse 76   Temp 98.2 °F (36.8 °C) (Tympanic)   Ht 5' 5" (1.651 m)   Wt 64.7 kg (142 lb 10.2 oz)   SpO2 99%   BMI 23.74 kg/m²     HPI  Pt reports shortness of breath at times and at night, thinks her ventolin inhaler alone is not enough. Other urgent care thought she needs pulmicort but didn't prescribe because she doesn't have PCP. 13 weeks pregnant, prenatal initial in 2 weeks with [facility]    Review of Systems   HENT: Positive for congestion.    Respiratory: Positive for shortness of breath. Negative for cough and wheezing.        Objective:      Physical Exam   Constitutional: She is oriented to person, place, and time. She appears well-developed and well-nourished. No distress.   HENT:   Head: Normocephalic and atraumatic.   Nasal congestion   Eyes: Pupils are equal, round, and reactive to light. EOM are normal.   Pulmonary/Chest: Effort normal and breath sounds normal. She has no wheezes. She has no rales.   Neurological: She is alert and oriented to person, place, and time. No cranial nerve deficit.   Skin: Skin is warm and dry. She is not diaphoretic.   Nursing note and vitals reviewed.      Assessment:       1. Asthma, unspecified asthma severity, unspecified whether complicated, unspecified whether persistent    2. 13 weeks gestation of pregnancy        Plan:     Marcella was seen today for shortness of breath.    Diagnoses and all orders for this visit:    Asthma, unspecified asthma severity, unspecified whether complicated, unspecified whether persistent  -     budesonide 180mcg (PULMICORT 180MCG) 180 mcg/actuation AePB; Inhale 1 puff into the lungs 2 (two) times daily. Controller    13 weeks gestation of pregnancy      1. Rx Pulmicort one puff every 12 hours. Ask your OB doctor if refill is " needed  2. Continue your ventolin as a rescue inhaler 2 puffs every 4-6 hours for shortness of breath/wheezing  3. OTC claritn daily. OTC flonase daily  4. Keep your OB appt 4/17. You may communicate with OB clinic through patient portal  5. Work excuse tomorrow

## 2019-04-03 NOTE — LETTER
April 3, 2019      [facility]  [address]  [place] [address]  Phone: [phone]  Fax: [phone]       Patient: Marcella Duke   YOB: 1995  Date of Visit: 04/03/2019    To Whom It May Concern:    Andriy Duke  was at [facility] on 04/03/2019. She may return to work/school on 4/5 with no restrictions. If you have any questions or concerns, or if I can be of further assistance, please do not hesitate to contact me.    Sincerely,    Sonam King MD

## 2019-04-04 ENCOUNTER — PATIENT MESSAGE (OUTPATIENT)
Dept: OBSTETRICS AND GYNECOLOGY | Facility: CLINIC | Age: 24
End: 2019-04-04

## 2019-04-04 DIAGNOSIS — O21.9 NAUSEA AND VOMITING IN PREGNANCY: Primary | ICD-10-CM

## 2019-04-04 RX ORDER — PROMETHAZINE HYDROCHLORIDE 25 MG/1
25 TABLET ORAL EVERY 6 HOURS PRN
Qty: 30 TABLET | Refills: 0 | Status: SHIPPED | OUTPATIENT
Start: 2019-04-04 | End: 2019-04-26 | Stop reason: SDUPTHER

## 2019-04-18 ENCOUNTER — PROCEDURE VISIT (OUTPATIENT)
Dept: OBSTETRICS AND GYNECOLOGY | Facility: CLINIC | Age: 24
End: 2019-04-18
Payer: MEDICAID

## 2019-04-18 ENCOUNTER — LAB VISIT (OUTPATIENT)
Dept: LAB | Facility: HOSPITAL | Age: 24
End: 2019-04-18
Attending: MIDWIFE
Payer: MEDICAID

## 2019-04-18 ENCOUNTER — TELEPHONE (OUTPATIENT)
Dept: OBSTETRICS AND GYNECOLOGY | Facility: CLINIC | Age: 24
End: 2019-04-18

## 2019-04-18 ENCOUNTER — INITIAL PRENATAL (OUTPATIENT)
Dept: OBSTETRICS AND GYNECOLOGY | Facility: CLINIC | Age: 24
End: 2019-04-18
Payer: MEDICAID

## 2019-04-18 VITALS — BODY MASS INDEX: 24.4 KG/M2 | DIASTOLIC BLOOD PRESSURE: 70 MMHG | SYSTOLIC BLOOD PRESSURE: 116 MMHG | WEIGHT: 146.63 LBS

## 2019-04-18 DIAGNOSIS — Z32.01 POSITIVE PREGNANCY TEST: ICD-10-CM

## 2019-04-18 DIAGNOSIS — O26.842 UTERINE SIZE-DATE DISCREPANCY IN SECOND TRIMESTER: ICD-10-CM

## 2019-04-18 DIAGNOSIS — R78.81 E COLI BACTEREMIA: ICD-10-CM

## 2019-04-18 DIAGNOSIS — Z32.01 POSITIVE PREGNANCY TEST: Primary | ICD-10-CM

## 2019-04-18 DIAGNOSIS — B96.20 E COLI BACTEREMIA: ICD-10-CM

## 2019-04-18 LAB
BASOPHILS # BLD AUTO: 0.02 K/UL (ref 0–0.2)
BASOPHILS NFR BLD: 0.3 % (ref 0–1.9)
DIFFERENTIAL METHOD: ABNORMAL
EOSINOPHIL # BLD AUTO: 0.1 K/UL (ref 0–0.5)
EOSINOPHIL NFR BLD: 1.1 % (ref 0–8)
ERYTHROCYTE [DISTWIDTH] IN BLOOD BY AUTOMATED COUNT: 14.1 % (ref 11.5–14.5)
HCT VFR BLD AUTO: 37.5 % (ref 37–48.5)
HGB BLD-MCNC: 12.3 G/DL (ref 12–16)
IMM GRANULOCYTES # BLD AUTO: 0.02 K/UL (ref 0–0.04)
IMM GRANULOCYTES NFR BLD AUTO: 0.3 % (ref 0–0.5)
LYMPHOCYTES # BLD AUTO: 1.9 K/UL (ref 1–4.8)
LYMPHOCYTES NFR BLD: 25.8 % (ref 18–48)
MCH RBC QN AUTO: 28.7 PG (ref 27–31)
MCHC RBC AUTO-ENTMCNC: 32.8 G/DL (ref 32–36)
MCV RBC AUTO: 88 FL (ref 82–98)
MONOCYTES # BLD AUTO: 0.2 K/UL (ref 0.3–1)
MONOCYTES NFR BLD: 3.3 % (ref 4–15)
NEUTROPHILS # BLD AUTO: 5.1 K/UL (ref 1.8–7.7)
NEUTROPHILS NFR BLD: 69.2 % (ref 38–73)
NRBC BLD-RTO: 0 /100 WBC
PLATELET # BLD AUTO: 208 K/UL (ref 150–350)
PMV BLD AUTO: 11.5 FL (ref 9.2–12.9)
RBC # BLD AUTO: 4.28 M/UL (ref 4–5.4)
WBC # BLD AUTO: 7.37 K/UL (ref 3.9–12.7)

## 2019-04-18 PROCEDURE — 99212 OFFICE O/P EST SF 10 MIN: CPT | Mod: PBBFAC,25,TH | Performed by: MIDWIFE

## 2019-04-18 PROCEDURE — 86762 RUBELLA ANTIBODY: CPT

## 2019-04-18 PROCEDURE — 85025 COMPLETE CBC W/AUTO DIFF WBC: CPT

## 2019-04-18 PROCEDURE — 86703 HIV-1/HIV-2 1 RESULT ANTBDY: CPT

## 2019-04-18 PROCEDURE — 83020 HEMOGLOBIN ELECTROPHORESIS: CPT

## 2019-04-18 PROCEDURE — 76805 OB US >/= 14 WKS SNGL FETUS: CPT | Mod: PBBFAC | Performed by: OBSTETRICS & GYNECOLOGY

## 2019-04-18 PROCEDURE — 86850 RBC ANTIBODY SCREEN: CPT

## 2019-04-18 PROCEDURE — 36415 COLL VENOUS BLD VENIPUNCTURE: CPT

## 2019-04-18 PROCEDURE — 87088 URINE BACTERIA CULTURE: CPT

## 2019-04-18 PROCEDURE — 86592 SYPHILIS TEST NON-TREP QUAL: CPT

## 2019-04-18 PROCEDURE — 87186 SC STD MICRODIL/AGAR DIL: CPT

## 2019-04-18 PROCEDURE — 87086 URINE CULTURE/COLONY COUNT: CPT

## 2019-04-18 PROCEDURE — 99999 PR PBB SHADOW E&M-EST. PATIENT-LVL II: ICD-10-PCS | Mod: PBBFAC,,, | Performed by: MIDWIFE

## 2019-04-18 PROCEDURE — 87340 HEPATITIS B SURFACE AG IA: CPT

## 2019-04-18 PROCEDURE — 87077 CULTURE AEROBIC IDENTIFY: CPT

## 2019-04-18 PROCEDURE — 99203 PR OFFICE/OUTPT VISIT, NEW, LEVL III, 30-44 MIN: ICD-10-PCS | Mod: TH,S$PBB,, | Performed by: MIDWIFE

## 2019-04-18 PROCEDURE — 76805 OB US >/= 14 WKS SNGL FETUS: CPT | Mod: 26,S$PBB,, | Performed by: OBSTETRICS & GYNECOLOGY

## 2019-04-18 PROCEDURE — 76805 PR US, OB 14+WKS, TRANSABD, SINGLE GESTATION: ICD-10-PCS | Mod: 26,S$PBB,, | Performed by: OBSTETRICS & GYNECOLOGY

## 2019-04-18 PROCEDURE — 87491 CHLMYD TRACH DNA AMP PROBE: CPT

## 2019-04-18 PROCEDURE — 99203 OFFICE O/P NEW LOW 30 MIN: CPT | Mod: TH,S$PBB,, | Performed by: MIDWIFE

## 2019-04-18 PROCEDURE — 99999 PR PBB SHADOW E&M-EST. PATIENT-LVL II: CPT | Mod: PBBFAC,,, | Performed by: MIDWIFE

## 2019-04-18 RX ORDER — SWAB
1 SWAB, NON-MEDICATED MISCELLANEOUS DAILY
Qty: 30 TABLET | Refills: 11 | Status: ON HOLD | OUTPATIENT
Start: 2019-04-18 | End: 2019-10-04 | Stop reason: HOSPADM

## 2019-04-18 NOTE — TELEPHONE ENCOUNTER
Spoke to patient and rescheduled her appointment for the same day 04/18/19 at 11:00am at the [place] location to see JAUN Ventura. Patient verbalized understanding.

## 2019-04-18 NOTE — TELEPHONE ENCOUNTER
----- Message from Siria Hanson sent at 4/18/2019  9:57 AM CDT -----  Contact: PATIENT  CALLING TO RESCHEDULE SAME DAY EARLIER TIME. PLEASE CALL PATIENT ASAP TODAY @ [phone]. THANKS, LILY

## 2019-04-18 NOTE — PROGRESS NOTES
23 y.o. female  at 16w0d S  feeling flutters/FM, denies VB, LOF or cramping  Doing well, Some SOB, seen at urgent care, asthma is flaring up, would like a machine to give herself breathing treatments  Dating US today, normal anatomy, Single L sided EIF, some sub opt views, will repeat at [place]  Prenatal labs today  Exam done  Anatomy US ordered  Reviewed warning signs, normal FM, pregnancy precautions and how/when to call.  RTC x 4 wks, call or present sooner prn.

## 2019-04-19 LAB
ABO + RH BLD: NORMAL
BLD GP AB SCN CELLS X3 SERPL QL: NORMAL
HBV SURFACE AG SERPL QL IA: NEGATIVE
HIV 1+2 AB+HIV1 P24 AG SERPL QL IA: NEGATIVE

## 2019-04-20 LAB — RPR SER QL: NORMAL

## 2019-04-22 PROBLEM — R78.81 E COLI BACTEREMIA: Status: ACTIVE | Noted: 2019-04-22

## 2019-04-22 PROBLEM — B96.20 E COLI BACTEREMIA: Status: ACTIVE | Noted: 2019-04-22

## 2019-04-22 LAB
BACTERIA UR CULT: NORMAL
RUBV IGG SER-ACNC: 17.1 IU/ML
RUBV IGG SER-IMP: REACTIVE

## 2019-04-22 RX ORDER — NITROFURANTOIN 25; 75 MG/1; MG/1
100 CAPSULE ORAL 2 TIMES DAILY
Qty: 14 CAPSULE | Refills: 0 | Status: SHIPPED | OUTPATIENT
Start: 2019-04-22 | End: 2019-04-29

## 2019-04-23 LAB
C TRACH DNA SPEC QL NAA+PROBE: NOT DETECTED
HGB A2 MFR BLD HPLC: 2.9 % (ref 2.2–3.2)
HGB FRACT BLD ELPH-IMP: NORMAL
HGB FRACT BLD ELPH-IMP: NORMAL
N GONORRHOEA DNA SPEC QL NAA+PROBE: NOT DETECTED

## 2019-04-26 ENCOUNTER — PATIENT MESSAGE (OUTPATIENT)
Dept: OBSTETRICS AND GYNECOLOGY | Facility: CLINIC | Age: 24
End: 2019-04-26

## 2019-04-26 DIAGNOSIS — O21.9 NAUSEA AND VOMITING IN PREGNANCY: ICD-10-CM

## 2019-04-26 RX ORDER — PROMETHAZINE HYDROCHLORIDE 25 MG/1
25 TABLET ORAL EVERY 6 HOURS PRN
Qty: 30 TABLET | Refills: 0 | Status: SHIPPED | OUTPATIENT
Start: 2019-04-26 | End: 2019-04-29 | Stop reason: SDUPTHER

## 2019-04-26 RX ORDER — PROMETHAZINE HYDROCHLORIDE 25 MG/1
25 TABLET ORAL EVERY 6 HOURS PRN
Qty: 30 TABLET | Status: CANCELLED | OUTPATIENT
Start: 2019-04-26

## 2019-04-26 NOTE — TELEPHONE ENCOUNTER
Pt is requesting a refill of her phenergan. Pt states she lost/can't find the prescription that was already given to her. Please advise.

## 2019-04-29 ENCOUNTER — TELEPHONE (OUTPATIENT)
Dept: OBSTETRICS AND GYNECOLOGY | Facility: CLINIC | Age: 24
End: 2019-04-29

## 2019-04-29 ENCOUNTER — PATIENT MESSAGE (OUTPATIENT)
Dept: OBSTETRICS AND GYNECOLOGY | Facility: CLINIC | Age: 24
End: 2019-04-29

## 2019-04-29 DIAGNOSIS — O21.9 NAUSEA AND VOMITING IN PREGNANCY: ICD-10-CM

## 2019-04-29 RX ORDER — PROMETHAZINE HYDROCHLORIDE 25 MG/1
25 TABLET ORAL EVERY 6 HOURS PRN
Qty: 30 TABLET | Refills: 0 | Status: SHIPPED | OUTPATIENT
Start: 2019-04-29 | End: 2019-06-11

## 2019-04-29 NOTE — TELEPHONE ENCOUNTER
----- Message from Chikis Jeremy sent at 4/26/2019  4:27 PM CDT -----  Contact: pt  1. What is the name of the medication you are requesting? phenergan  2. What is the dose? n/a  3. How do you take the medication? Orally, topically, etc? n/a  4. How often do you take this medication? n/a  5. Do you need a 30 day or 90 day supply? n/a  6. How many refills are you requesting? n/a  7. What is your preferred pharmacy and location of the pharmacy? [facility] / [facility]  8. Who can we contact with further questions? The pt at [phone]

## 2019-04-29 NOTE — TELEPHONE ENCOUNTER
Patient notified that Phenergan 25 mg was e-scribed to [facility]'s on [address] . Patient verbalized understanding.

## 2019-04-30 NOTE — TELEPHONE ENCOUNTER
Patient was offered an appointment with Dr. Morgan at [facility]. Patient declined appointment and stated she will continue to take antibiotics . She has 3 days left of the antibiotics. Patient was instructed to call the office if she decide to schedule an appointment. Patient verbalized understanding.

## 2019-05-15 ENCOUNTER — PROCEDURE VISIT (OUTPATIENT)
Dept: OBSTETRICS AND GYNECOLOGY | Facility: CLINIC | Age: 24
End: 2019-05-15
Payer: MEDICAID

## 2019-05-15 ENCOUNTER — ROUTINE PRENATAL (OUTPATIENT)
Dept: OBSTETRICS AND GYNECOLOGY | Facility: CLINIC | Age: 24
End: 2019-05-15
Payer: MEDICAID

## 2019-05-15 VITALS
BODY MASS INDEX: 24.95 KG/M2 | SYSTOLIC BLOOD PRESSURE: 126 MMHG | DIASTOLIC BLOOD PRESSURE: 78 MMHG | WEIGHT: 149.94 LBS

## 2019-05-15 DIAGNOSIS — Z36.3 ANTENATAL SCREENING FOR MALFORMATION USING ULTRASONICS: ICD-10-CM

## 2019-05-15 DIAGNOSIS — Z34.82 ENCOUNTER FOR SUPERVISION OF OTHER NORMAL PREGNANCY, SECOND TRIMESTER: Primary | ICD-10-CM

## 2019-05-15 DIAGNOSIS — Z3A.19 19 WEEKS GESTATION OF PREGNANCY: ICD-10-CM

## 2019-05-15 PROCEDURE — 76805 OB US >/= 14 WKS SNGL FETUS: CPT | Mod: 26,S$PBB,, | Performed by: OBSTETRICS & GYNECOLOGY

## 2019-05-15 PROCEDURE — 76805 OB US >/= 14 WKS SNGL FETUS: CPT | Mod: PBBFAC | Performed by: OBSTETRICS & GYNECOLOGY

## 2019-05-15 PROCEDURE — 99999 PR PBB SHADOW E&M-EST. PATIENT-LVL II: CPT | Mod: PBBFAC,,, | Performed by: MIDWIFE

## 2019-05-15 PROCEDURE — 99999 PR PBB SHADOW E&M-EST. PATIENT-LVL II: ICD-10-PCS | Mod: PBBFAC,,, | Performed by: MIDWIFE

## 2019-05-15 PROCEDURE — 99212 PR OFFICE/OUTPT VISIT, EST, LEVL II, 10-19 MIN: ICD-10-PCS | Mod: TH,S$PBB,, | Performed by: MIDWIFE

## 2019-05-15 PROCEDURE — 99212 OFFICE O/P EST SF 10 MIN: CPT | Mod: TH,S$PBB,, | Performed by: MIDWIFE

## 2019-05-15 PROCEDURE — 76805 PR US, OB 14+WKS, TRANSABD, SINGLE GESTATION: ICD-10-PCS | Mod: 26,S$PBB,, | Performed by: OBSTETRICS & GYNECOLOGY

## 2019-05-15 PROCEDURE — 99212 OFFICE O/P EST SF 10 MIN: CPT | Mod: PBBFAC,TH,25 | Performed by: MIDWIFE

## 2019-05-15 NOTE — PROGRESS NOTES
23 y.o. female  at 19w6d    Starting to feel flutters/FM, denies VB, LOF or cramping  Doing well without concerns   TWG: 3 lbs   Anatomy US today  Reviewed warning signs, normal FM,  labor precautions and how/when to call.  RTC x 4 wks, call or present sooner prn.

## 2019-05-30 ENCOUNTER — TELEPHONE (OUTPATIENT)
Dept: OBSTETRICS AND GYNECOLOGY | Facility: CLINIC | Age: 24
End: 2019-05-30

## 2019-05-30 NOTE — TELEPHONE ENCOUNTER
----- Message from Cassandra Sweeney sent at 5/30/2019 11:26 AM CDT -----  Contact: self  needs call back regarding status of [facility] paperwork..[phone] (home)

## 2019-05-30 NOTE — TELEPHONE ENCOUNTER
Spoke to patient. Patient stated that she left her la paperwork with the lady at the  and put Payton's name on it. Spoke to Payton, if she has the paperwork it is in [place] and she will check when she gets back to that location. Informed patient that she can fax it to our fax machine at [phone]. Patient stated that she will try to find access to fax machine but is unable to bring another copy to the office since she gets off work after we close. Informed patient that if she is able to fax it, I will have Lorenzo sign it tomorrow since she will be here at [place] and call the patient as soon as it is complete. Patient requested paperwork be given to Payton and would like Payton to call her when it has been completed.

## 2019-06-05 ENCOUNTER — PATIENT MESSAGE (OUTPATIENT)
Dept: OBSTETRICS AND GYNECOLOGY | Facility: CLINIC | Age: 24
End: 2019-06-05

## 2019-06-05 ENCOUNTER — TELEPHONE (OUTPATIENT)
Dept: OBSTETRICS AND GYNECOLOGY | Facility: CLINIC | Age: 24
End: 2019-06-05

## 2019-06-05 NOTE — TELEPHONE ENCOUNTER
----- Message from Maureen Olsen LPN sent at 6/5/2019 11:44 AM CDT -----  Contact: Pt says she will fax the forms to [phone] douglas [employer]      ----- Message -----  From: Grecia Padilla  Sent: 6/5/2019  10:19 AM  To: [facility] Staff    Please call pt @ [phone] regarding FMLA papers, pt states she refax papers again, need to know if received.

## 2019-06-11 ENCOUNTER — ROUTINE PRENATAL (OUTPATIENT)
Dept: OBSTETRICS AND GYNECOLOGY | Facility: CLINIC | Age: 24
End: 2019-06-11
Payer: MEDICAID

## 2019-06-11 VITALS — SYSTOLIC BLOOD PRESSURE: 98 MMHG | DIASTOLIC BLOOD PRESSURE: 60 MMHG | BODY MASS INDEX: 26.01 KG/M2 | WEIGHT: 156.31 LBS

## 2019-06-11 DIAGNOSIS — Z87.440 HISTORY OF UTI: ICD-10-CM

## 2019-06-11 DIAGNOSIS — Z34.82 ENCOUNTER FOR SUPERVISION OF OTHER NORMAL PREGNANCY, SECOND TRIMESTER: Primary | ICD-10-CM

## 2019-06-11 DIAGNOSIS — Z86.79 HISTORY OF HIGH BLOOD PRESSURE: ICD-10-CM

## 2019-06-11 DIAGNOSIS — Z3A.28 28 WEEKS GESTATION OF PREGNANCY: ICD-10-CM

## 2019-06-11 PROCEDURE — 87186 SC STD MICRODIL/AGAR DIL: CPT

## 2019-06-11 PROCEDURE — 87077 CULTURE AEROBIC IDENTIFY: CPT

## 2019-06-11 PROCEDURE — 87088 URINE BACTERIA CULTURE: CPT

## 2019-06-11 PROCEDURE — 99212 PR OFFICE/OUTPT VISIT, EST, LEVL II, 10-19 MIN: ICD-10-PCS | Mod: TH,S$PBB,, | Performed by: MIDWIFE

## 2019-06-11 PROCEDURE — 87086 URINE CULTURE/COLONY COUNT: CPT

## 2019-06-11 PROCEDURE — 99999 PR PBB SHADOW E&M-EST. PATIENT-LVL II: ICD-10-PCS | Mod: PBBFAC,,, | Performed by: MIDWIFE

## 2019-06-11 PROCEDURE — 99999 PR PBB SHADOW E&M-EST. PATIENT-LVL II: CPT | Mod: PBBFAC,,, | Performed by: MIDWIFE

## 2019-06-11 PROCEDURE — 99212 OFFICE O/P EST SF 10 MIN: CPT | Mod: TH,S$PBB,, | Performed by: MIDWIFE

## 2019-06-11 PROCEDURE — 99212 OFFICE O/P EST SF 10 MIN: CPT | Mod: PBBFAC,TH | Performed by: MIDWIFE

## 2019-06-11 NOTE — PROGRESS NOTES
23 y.o. female  at 23w5d   Reports + FM, denies VB, LOF, or cramping  Doing well without concerns   TW lbs   TDAP info given  Repeat urine culture today  Reviewed upcoming 28wk labs, (A POS) and orders placed  Reviewed warning signs, normal FM,  labor precautions and how/when to call.  RTC x 4 wks, call or present sooner prn.

## 2019-06-12 ENCOUNTER — PATIENT MESSAGE (OUTPATIENT)
Dept: OBSTETRICS AND GYNECOLOGY | Facility: CLINIC | Age: 24
End: 2019-06-12

## 2019-06-14 DIAGNOSIS — N30.00 ACUTE CYSTITIS WITHOUT HEMATURIA: Primary | ICD-10-CM

## 2019-06-14 LAB — BACTERIA UR CULT: NORMAL

## 2019-06-14 RX ORDER — NITROFURANTOIN 25; 75 MG/1; MG/1
100 CAPSULE ORAL 2 TIMES DAILY
Qty: 14 CAPSULE | Refills: 0 | Status: SHIPPED | OUTPATIENT
Start: 2019-06-14 | End: 2019-06-21

## 2019-06-28 ENCOUNTER — PATIENT MESSAGE (OUTPATIENT)
Dept: OBSTETRICS AND GYNECOLOGY | Facility: CLINIC | Age: 24
End: 2019-06-28

## 2019-06-28 ENCOUNTER — HOSPITAL ENCOUNTER (EMERGENCY)
Facility: HOSPITAL | Age: 24
Discharge: HOME | End: 2019-06-28
Payer: MEDICAID

## 2019-06-28 VITALS
WEIGHT: 156.5 LBS | TEMPERATURE: 98 F | RESPIRATION RATE: 17 BRPM | HEIGHT: 65 IN | DIASTOLIC BLOOD PRESSURE: 71 MMHG | HEART RATE: 74 BPM | BODY MASS INDEX: 26.08 KG/M2 | OXYGEN SATURATION: 99 % | SYSTOLIC BLOOD PRESSURE: 117 MMHG

## 2019-06-28 DIAGNOSIS — R11.10 VOMITING, INTRACTABILITY OF VOMITING NOT SPECIFIED, PRESENCE OF NAUSEA NOT SPECIFIED, UNSPECIFIED VOMITING TYPE: ICD-10-CM

## 2019-06-28 DIAGNOSIS — Z3A.26 26 WEEKS GESTATION OF PREGNANCY: ICD-10-CM

## 2019-06-28 DIAGNOSIS — N30.00 ACUTE CYSTITIS WITHOUT HEMATURIA: ICD-10-CM

## 2019-06-28 DIAGNOSIS — Z34.90 PREGNANCY, UNSPECIFIED GESTATIONAL AGE: Primary | ICD-10-CM

## 2019-06-28 LAB
ALBUMIN SERPL BCP-MCNC: 3.2 G/DL (ref 3.5–5.2)
ALP SERPL-CCNC: 56 U/L (ref 55–135)
ALT SERPL W/O P-5'-P-CCNC: 9 U/L (ref 10–44)
AMYLASE SERPL-CCNC: 81 U/L (ref 20–110)
ANION GAP SERPL CALC-SCNC: 10 MMOL/L (ref 8–16)
AST SERPL-CCNC: 16 U/L (ref 10–40)
BACTERIA #/AREA URNS HPF: ABNORMAL /HPF
BASOPHILS # BLD AUTO: 0 K/UL (ref 0–0.2)
BASOPHILS NFR BLD: 0 % (ref 0–1.9)
BILIRUB SERPL-MCNC: 0.2 MG/DL (ref 0.1–1)
BILIRUB UR QL STRIP: NEGATIVE
BUN SERPL-MCNC: 10 MG/DL (ref 6–20)
CALCIUM SERPL-MCNC: 8.6 MG/DL (ref 8.7–10.5)
CHLORIDE SERPL-SCNC: 106 MMOL/L (ref 95–110)
CLARITY UR: CLEAR
CO2 SERPL-SCNC: 19 MMOL/L (ref 23–29)
COLOR UR: YELLOW
CREAT SERPL-MCNC: 0.7 MG/DL (ref 0.5–1.4)
DIFFERENTIAL METHOD: NORMAL
EOSINOPHIL # BLD AUTO: 0.1 K/UL (ref 0–0.5)
EOSINOPHIL NFR BLD: 1.8 % (ref 0–8)
ERYTHROCYTE [DISTWIDTH] IN BLOOD BY AUTOMATED COUNT: 13.2 % (ref 11.5–14.5)
EST. GFR  (AFRICAN AMERICAN): >60 ML/MIN/1.73 M^2
EST. GFR  (NON AFRICAN AMERICAN): >60 ML/MIN/1.73 M^2
GLUCOSE SERPL-MCNC: 84 MG/DL (ref 70–110)
GLUCOSE UR QL STRIP: NEGATIVE
HCT VFR BLD AUTO: 37 % (ref 37–48.5)
HGB BLD-MCNC: 12.8 G/DL (ref 12–16)
HGB UR QL STRIP: NEGATIVE
KETONES UR QL STRIP: NEGATIVE
LEUKOCYTE ESTERASE UR QL STRIP: ABNORMAL
LIPASE SERPL-CCNC: 22 U/L (ref 4–60)
LYMPHOCYTES # BLD AUTO: 1.9 K/UL (ref 1–4.8)
LYMPHOCYTES NFR BLD: 28.2 % (ref 18–48)
MAGNESIUM SERPL-MCNC: 1.8 MG/DL (ref 1.6–2.6)
MCH RBC QN AUTO: 29.4 PG (ref 27–31)
MCHC RBC AUTO-ENTMCNC: 34.6 G/DL (ref 32–36)
MCV RBC AUTO: 85 FL (ref 82–98)
MICROSCOPIC COMMENT: ABNORMAL
MONOCYTES # BLD AUTO: 0.4 K/UL (ref 0.3–1)
MONOCYTES NFR BLD: 5.9 % (ref 4–15)
NEUTROPHILS # BLD AUTO: 4.4 K/UL (ref 1.8–7.7)
NEUTROPHILS NFR BLD: 64.2 % (ref 38–73)
NITRITE UR QL STRIP: POSITIVE
PH UR STRIP: 7 [PH] (ref 5–8)
PHOSPHATE SERPL-MCNC: 3 MG/DL (ref 2.7–4.5)
PLATELET # BLD AUTO: 169 K/UL (ref 150–350)
PMV BLD AUTO: 11 FL (ref 9.2–12.9)
POTASSIUM SERPL-SCNC: 3.9 MMOL/L (ref 3.5–5.1)
PROT SERPL-MCNC: 7 G/DL (ref 6–8.4)
PROT UR QL STRIP: NEGATIVE
RBC # BLD AUTO: 4.35 M/UL (ref 4–5.4)
SODIUM SERPL-SCNC: 135 MMOL/L (ref 136–145)
SP GR UR STRIP: 1.01 (ref 1–1.03)
SQUAMOUS #/AREA URNS HPF: 20 /HPF
TSH SERPL DL<=0.005 MIU/L-ACNC: 0.64 UIU/ML (ref 0.4–4)
URN SPEC COLLECT METH UR: ABNORMAL
UROBILINOGEN UR STRIP-ACNC: NEGATIVE EU/DL
WBC # BLD AUTO: 6.82 K/UL (ref 3.9–12.7)
WBC #/AREA URNS HPF: 8 /HPF (ref 0–5)

## 2019-06-28 PROCEDURE — 84100 ASSAY OF PHOSPHORUS: CPT

## 2019-06-28 PROCEDURE — 81000 URINALYSIS NONAUTO W/SCOPE: CPT

## 2019-06-28 PROCEDURE — 99283 EMERGENCY DEPT VISIT LOW MDM: CPT

## 2019-06-28 PROCEDURE — 82150 ASSAY OF AMYLASE: CPT

## 2019-06-28 PROCEDURE — 85025 COMPLETE CBC W/AUTO DIFF WBC: CPT

## 2019-06-28 PROCEDURE — 25000003 PHARM REV CODE 250: Performed by: NURSE PRACTITIONER

## 2019-06-28 PROCEDURE — 83735 ASSAY OF MAGNESIUM: CPT

## 2019-06-28 PROCEDURE — 84443 ASSAY THYROID STIM HORMONE: CPT

## 2019-06-28 PROCEDURE — 36415 COLL VENOUS BLD VENIPUNCTURE: CPT

## 2019-06-28 PROCEDURE — 80053 COMPREHEN METABOLIC PANEL: CPT

## 2019-06-28 PROCEDURE — 83690 ASSAY OF LIPASE: CPT

## 2019-06-28 RX ORDER — ONDANSETRON 4 MG/1
4 TABLET, ORALLY DISINTEGRATING ORAL
Status: DISCONTINUED | OUTPATIENT
Start: 2019-06-28 | End: 2019-06-28 | Stop reason: HOSPADM

## 2019-06-28 RX ORDER — DEXTROSE MONOHYDRATE AND SODIUM CHLORIDE 5; .9 G/100ML; G/100ML
1000 INJECTION, SOLUTION INTRAVENOUS
Status: COMPLETED | OUTPATIENT
Start: 2019-06-28 | End: 2019-06-28

## 2019-06-28 RX ORDER — NITROFURANTOIN 25; 75 MG/1; MG/1
100 CAPSULE ORAL 2 TIMES DAILY
Qty: 14 CAPSULE | Refills: 0 | Status: SHIPPED | OUTPATIENT
Start: 2019-06-28 | End: 2019-07-05

## 2019-06-28 RX ADMIN — DEXTROSE AND SODIUM CHLORIDE 1000 ML: 5; .9 INJECTION, SOLUTION INTRAVENOUS at 07:06

## 2019-06-28 NOTE — ED PROVIDER NOTES
SCRIBE #1 NOTE: I, Gabriel Nicole, am scribing for, and in the presence of, Salome Gusman NP. I have scribed the entire note.      History      Chief Complaint   Patient presents with    Vomiting     blood tinged emesis x3 or 4 months, normally vomits one or two times daily; 6.5 months pregnant, OB unaware of issue       Review of patient's allergies indicates:  No Known Allergies     HPI   HPI    2019, 6:13 PM   History obtained from the patient      History of Present Illness: Marcella Duke is a 23 y.o. female patient with a PMHx of asthma who presents to the Emergency Department for blood-tinged emesis x 4 months. Pt states that she typically has 1-2 episodes of emesis daily, and has noticed small streaks of bright red blood in her vomit. She is 26 weeks pregnant. Symptoms are episodic and moderate in severity. No mitigating or exacerbating factors reported. Associated sxs include SOB. Patient denies any fever, chills, abdominal pain, suprapubic pain, dysuria, hematuria, flank pain, blood in stool, palpitations, CP, weakness, headache, and all other sxs at this time. Prior Tx includes promethazine. No further complaints or concerns at this time.     Arrival mode: Personal vehicle    PCP: Primary Doctor No       Past Medical History:  Past Medical History:   Diagnosis Date    Asthma     Hypertension affecting pregnancy in third trimester, antepartum 2016       Past Surgical History:  Past Surgical History:   Procedure Laterality Date    none           Family History:  Family History   Problem Relation Age of Onset    Stroke Paternal Grandmother     Hypertension Maternal Grandmother     Breast cancer Neg Hx     Cancer Neg Hx     Colon cancer Neg Hx     Diabetes Neg Hx     Eclampsia Neg Hx     Miscarriages / Stillbirths Neg Hx     Ovarian cancer Neg Hx      labor Neg Hx        Social History:  Social History     Tobacco Use    Smoking status: Never Smoker    Smokeless tobacco:  Never Used   Substance and Sexual Activity    Alcohol use: Yes     Comment: occ    Drug use: No    Sexual activity: Yes     Partners: Male     ROS   Review of Systems   Constitutional: Negative for chills, diaphoresis, fatigue and fever.   HENT: Negative for sore throat.    Respiratory: Positive for shortness of breath.    Cardiovascular: Negative for chest pain, palpitations and leg swelling.   Gastrointestinal: Positive for nausea and vomiting. Negative for abdominal pain, blood in stool and diarrhea.   Genitourinary: Negative for dysuria, flank pain and hematuria.   Musculoskeletal: Negative for back pain.   Skin: Negative for rash and wound.   Neurological: Negative for dizziness, weakness, light-headedness, numbness and headaches.   Hematological: Does not bruise/bleed easily.   All other systems reviewed and are negative.    Physical Exam      Initial Vitals [06/28/19 1752]   BP Pulse Resp Temp SpO2   117/71 74 17 98.1 °F (36.7 °C) 99 %      MAP       --          Physical Exam  Nursing Notes and Vital Signs Reviewed.  Constitutional: Patient is in no acute distress. Well-developed and well-nourished.  Head: Atraumatic. Normocephalic.  Eyes: PERRL. EOM intact. Conjunctivae are not pale. No scleral icterus.  ENT: Mucous membranes are moist. Oropharynx is clear and symmetric.    Neck: Supple. Full ROM. No lymphadenopathy.  Cardiovascular: Regular rate. Regular rhythm. No murmurs, rubs, or gallops. Distal pulses are 2+ and symmetric.  Pulmonary/Chest: No respiratory distress. Clear to auscultation bilaterally. No wheezing or rales.  Abdominal: Soft and non-distended.  There is no tenderness.  No rebound, guarding, or rigidity. Good bowel sounds.  Musculoskeletal: Moves all extremities. No obvious deformities. No edema. No calf tenderness.  Skin: Warm and dry.  Neurological:  Alert, awake, and appropriate.  Normal speech.  No acute focal neurological deficits are appreciated.  Psychiatric: Normal affect. Good  "eye contact. Appropriate in content.    ED Course    Procedures  ED Vital Signs:  Vitals:    06/28/19 1752   BP: 117/71   Pulse: 74   Resp: 17   Temp: 98.1 °F (36.7 °C)   TempSrc: Oral   SpO2: 99%   Weight: 71 kg (156 lb 8.4 oz)   Height: 5' 5" (1.651 m)       Abnormal Lab Results:  Labs Reviewed   COMPREHENSIVE METABOLIC PANEL - Abnormal; Notable for the following components:       Result Value    Sodium 135 (*)     CO2 19 (*)     Calcium 8.6 (*)     Albumin 3.2 (*)     ALT 9 (*)     All other components within normal limits   URINALYSIS, REFLEX TO URINE CULTURE - Abnormal; Notable for the following components:    Nitrite, UA Positive (*)     Leukocytes, UA 2+ (*)     All other components within normal limits    Narrative:     Preferred Collection Type->Urine, Clean Catch   URINALYSIS MICROSCOPIC - Abnormal; Notable for the following components:    WBC, UA 8 (*)     Bacteria Few (*)     All other components within normal limits    Narrative:     Preferred Collection Type->Urine, Clean Catch   CBC W/ AUTO DIFFERENTIAL   LIPASE   TSH   MAGNESIUM   PHOSPHORUS   AMYLASE      All Lab Results:  Results for orders placed or performed during the hospital encounter of 06/28/19   CBC auto differential   Result Value Ref Range    WBC 6.82 3.90 - 12.70 K/uL    RBC 4.35 4.00 - 5.40 M/uL    Hemoglobin 12.8 12.0 - 16.0 g/dL    Hematocrit 37.0 37.0 - 48.5 %    Mean Corpuscular Volume 85 82 - 98 fL    Mean Corpuscular Hemoglobin 29.4 27.0 - 31.0 pg    Mean Corpuscular Hemoglobin Conc 34.6 32.0 - 36.0 g/dL    RDW 13.2 11.5 - 14.5 %    Platelets 169 150 - 350 K/uL    MPV 11.0 9.2 - 12.9 fL    Gran # (ANC) 4.4 1.8 - 7.7 K/uL    Lymph # 1.9 1.0 - 4.8 K/uL    Mono # 0.4 0.3 - 1.0 K/uL    Eos # 0.1 0.0 - 0.5 K/uL    Baso # 0.00 0.00 - 0.20 K/uL    Gran% 64.2 38.0 - 73.0 %    Lymph% 28.2 18.0 - 48.0 %    Mono% 5.9 4.0 - 15.0 %    Eosinophil% 1.8 0.0 - 8.0 %    Basophil% 0.0 0.0 - 1.9 %    Differential Method Automated    Comprehensive " metabolic panel   Result Value Ref Range    Sodium 135 (L) 136 - 145 mmol/L    Potassium 3.9 3.5 - 5.1 mmol/L    Chloride 106 95 - 110 mmol/L    CO2 19 (L) 23 - 29 mmol/L    Glucose 84 70 - 110 mg/dL    BUN, Bld 10 6 - 20 mg/dL    Creatinine 0.7 0.5 - 1.4 mg/dL    Calcium 8.6 (L) 8.7 - 10.5 mg/dL    Total Protein 7.0 6.0 - 8.4 g/dL    Albumin 3.2 (L) 3.5 - 5.2 g/dL    Total Bilirubin 0.2 0.1 - 1.0 mg/dL    Alkaline Phosphatase 56 55 - 135 U/L    AST 16 10 - 40 U/L    ALT 9 (L) 10 - 44 U/L    Anion Gap 10 8 - 16 mmol/L    eGFR if African American >60 >60 mL/min/1.73 m^2    eGFR if non African American >60 >60 mL/min/1.73 m^2   Urinalysis, Reflex to Urine Culture Urine, Clean Catch   Result Value Ref Range    Specimen UA Urine, Clean Catch     Color, UA Yellow Yellow, Straw, Chely    Appearance, UA Clear Clear    pH, UA 7.0 5.0 - 8.0    Specific Gravity, UA 1.015 1.005 - 1.030    Protein, UA Negative Negative    Glucose, UA Negative Negative    Ketones, UA Negative Negative    Bilirubin (UA) Negative Negative    Occult Blood UA Negative Negative    Nitrite, UA Positive (A) Negative    Urobilinogen, UA Negative <2.0 EU/dL    Leukocytes, UA 2+ (A) Negative   Lipase   Result Value Ref Range    Lipase 22 4 - 60 U/L   TSH   Result Value Ref Range    TSH 0.642 0.400 - 4.000 uIU/mL   Magnesium   Result Value Ref Range    Magnesium 1.8 1.6 - 2.6 mg/dL   Phosphorus   Result Value Ref Range    Phosphorus 3.0 2.7 - 4.5 mg/dL   Amylase   Result Value Ref Range    Amylase 81 20 - 110 U/L   Urinalysis Microscopic   Result Value Ref Range    WBC, UA 8 (H) 0 - 5 /hpf    Bacteria Few (A) None-Occ /hpf    Squam Epithel, UA 20 /hpf    Microscopic Comment SEE COMMENT           Pregnancy, unspecified gestational age    26 weeks gestation of pregnancy    Acute cystitis without hematuria    Vomiting, intractability of vomiting not specified, presence of nausea not specified, unspecified vomiting type    Other orders  -     CBC auto  differential; Standing  -     Comprehensive metabolic panel; Standing  -     Saline lock IV; Standing  -     Urinalysis, Reflex to Urine Culture Urine, Clean Catch; Standing  -     dextrose 5 % and 0.9 % NaCl infusion  -     Lipase; Standing  -     Cancel: Orthostatic blood pressure; Standing  -     TSH; Standing  -     Magnesium; Standing  -     Phosphorus; Standing  -     Amylase; Standing  -     Urinalysis Microscopic; Standing  -     Cancel: Assess fetal heart tones; Standing  -     Discontinue: ondansetron disintegrating tablet 4 mg  -     nitrofurantoin, macrocrystal-monohydrate, (MACROBID) 100 MG capsule; Take 1 capsule (100 mg total) by mouth 2 (two) times daily. for 7 days  Dispense: 14 capsule; Refill: 0      Follow-up Information     Nurse Midwife or Obstetrician.    [facility] information[facility]  [facility]           [facility] - .    Specialty:  Emergency Medicine  Why:  If symptoms worsen - continued vomiting, feelings of passing out, large amount of bright red blood with vomiting  Contact information:  [address] [facility] [address] [phone]               The Emergency Provider reviewed the vital signs and test results, which are outlined above.    ED Discussion     7:43 PM: Reassessed pt at this time. Pt has had not episodes of emesis in the ED. Discussed with pt all pertinent ED information and results. Discussed pt dx and plan of tx. Gave pt all f/u and return to the ED instructions. All questions and concerns were addressed at this time. Pt expresses understanding of information and instructions, and is comfortable with plan to discharge. Pt is stable for discharge.    I discussed with patient and/or family/caretaker that evaluation in the ED does not suggest any emergent or life threatening medical conditions requiring immediate intervention beyond what was provided in the ED, and I believe patient is safe for  discharge.  Regardless, an unremarkable evaluation in the ED does not preclude the development or presence of a serious of life threatening condition. As such, patient was instructed to return immediately for any worsening or change in current symptoms.    ED Medication(s):  Medications   ondansetron disintegrating tablet 4 mg (has no administration in time range)   dextrose 5 % and 0.9 % NaCl infusion (1,000 mLs Intravenous New Bag 6/28/19 2088)        New Prescriptions    No medications on file        Medical Decision Making    Medical Decision Making:   Clinical Tests:   Lab Tests: Ordered and Reviewed           Scribe Attestation:   Scribe #1: I performed the above scribed service and the documentation accurately describes the services I performed. I attest to the accuracy of the note.    Attending:   Physician Attestation Statement for Scribe #1: I, Salome Gusman NP, personally performed the services described in this documentation, as scribed by Gabriel Nicole, in my presence, and it is both accurate and complete.          Clinical Impression       ICD-10-CM ICD-9-CM   1. Pregnancy, unspecified gestational age Z34.90 V22.2   2. 26 weeks gestation of pregnancy Z3A.26 V22.2   3. Acute cystitis without hematuria N30.00 595.0   4. Vomiting, intractability of vomiting not specified, presence of nausea not specified, unspecified vomiting type R11.10 787.03       Disposition:   Disposition: Discharged  Condition: Stable         Salome Gusman NP  07/05/19 5034

## 2019-07-11 ENCOUNTER — ROUTINE PRENATAL (OUTPATIENT)
Dept: OBSTETRICS AND GYNECOLOGY | Facility: CLINIC | Age: 24
End: 2019-07-11
Payer: MEDICAID

## 2019-07-11 ENCOUNTER — LAB VISIT (OUTPATIENT)
Dept: LAB | Facility: HOSPITAL | Age: 24
End: 2019-07-11
Attending: ADVANCED PRACTICE MIDWIFE
Payer: MEDICAID

## 2019-07-11 VITALS
DIASTOLIC BLOOD PRESSURE: 82 MMHG | WEIGHT: 159.38 LBS | BODY MASS INDEX: 26.52 KG/M2 | SYSTOLIC BLOOD PRESSURE: 126 MMHG

## 2019-07-11 DIAGNOSIS — R78.81 E COLI BACTEREMIA: ICD-10-CM

## 2019-07-11 DIAGNOSIS — Z34.82 ENCOUNTER FOR SUPERVISION OF OTHER NORMAL PREGNANCY, SECOND TRIMESTER: Primary | ICD-10-CM

## 2019-07-11 DIAGNOSIS — B96.20 E COLI BACTEREMIA: ICD-10-CM

## 2019-07-11 DIAGNOSIS — Z3A.28 28 WEEKS GESTATION OF PREGNANCY: ICD-10-CM

## 2019-07-11 DIAGNOSIS — J45.909 ASTHMA, UNSPECIFIED ASTHMA SEVERITY, UNSPECIFIED WHETHER COMPLICATED, UNSPECIFIED WHETHER PERSISTENT: ICD-10-CM

## 2019-07-11 LAB
BASOPHILS # BLD AUTO: 0.01 K/UL (ref 0–0.2)
BASOPHILS NFR BLD: 0.1 % (ref 0–1.9)
DIFFERENTIAL METHOD: ABNORMAL
EOSINOPHIL # BLD AUTO: 0.1 K/UL (ref 0–0.5)
EOSINOPHIL NFR BLD: 0.8 % (ref 0–8)
ERYTHROCYTE [DISTWIDTH] IN BLOOD BY AUTOMATED COUNT: 13.3 % (ref 11.5–14.5)
GLUCOSE SERPL-MCNC: 126 MG/DL (ref 70–140)
HCT VFR BLD AUTO: 38.2 % (ref 37–48.5)
HGB BLD-MCNC: 12.2 G/DL (ref 12–16)
IMM GRANULOCYTES # BLD AUTO: 0.04 K/UL (ref 0–0.04)
IMM GRANULOCYTES NFR BLD AUTO: 0.4 % (ref 0–0.5)
LYMPHOCYTES # BLD AUTO: 1.2 K/UL (ref 1–4.8)
LYMPHOCYTES NFR BLD: 12.8 % (ref 18–48)
MCH RBC QN AUTO: 28.6 PG (ref 27–31)
MCHC RBC AUTO-ENTMCNC: 31.9 G/DL (ref 32–36)
MCV RBC AUTO: 90 FL (ref 82–98)
MONOCYTES # BLD AUTO: 0.3 K/UL (ref 0.3–1)
MONOCYTES NFR BLD: 3.1 % (ref 4–15)
NEUTROPHILS # BLD AUTO: 8 K/UL (ref 1.8–7.7)
NEUTROPHILS NFR BLD: 82.8 % (ref 38–73)
NRBC BLD-RTO: 0 /100 WBC
PLATELET # BLD AUTO: 159 K/UL (ref 150–350)
PMV BLD AUTO: 12.6 FL (ref 9.2–12.9)
RBC # BLD AUTO: 4.27 M/UL (ref 4–5.4)
WBC # BLD AUTO: 9.67 K/UL (ref 3.9–12.7)

## 2019-07-11 PROCEDURE — 99212 OFFICE O/P EST SF 10 MIN: CPT | Mod: PBBFAC,TH | Performed by: ADVANCED PRACTICE MIDWIFE

## 2019-07-11 PROCEDURE — 86592 SYPHILIS TEST NON-TREP QUAL: CPT

## 2019-07-11 PROCEDURE — 99999 PR PBB SHADOW E&M-EST. PATIENT-LVL II: ICD-10-PCS | Mod: PBBFAC,,, | Performed by: ADVANCED PRACTICE MIDWIFE

## 2019-07-11 PROCEDURE — 86703 HIV-1/HIV-2 1 RESULT ANTBDY: CPT

## 2019-07-11 PROCEDURE — 99999 PR PBB SHADOW E&M-EST. PATIENT-LVL II: CPT | Mod: PBBFAC,,, | Performed by: ADVANCED PRACTICE MIDWIFE

## 2019-07-11 PROCEDURE — 82950 GLUCOSE TEST: CPT

## 2019-07-11 PROCEDURE — 99213 OFFICE O/P EST LOW 20 MIN: CPT | Mod: TH,S$PBB,, | Performed by: ADVANCED PRACTICE MIDWIFE

## 2019-07-11 PROCEDURE — 99213 PR OFFICE/OUTPT VISIT, EST, LEVL III, 20-29 MIN: ICD-10-PCS | Mod: TH,S$PBB,, | Performed by: ADVANCED PRACTICE MIDWIFE

## 2019-07-11 PROCEDURE — 85025 COMPLETE CBC W/AUTO DIFF WBC: CPT

## 2019-07-11 PROCEDURE — 36415 COLL VENOUS BLD VENIPUNCTURE: CPT

## 2019-07-11 RX ORDER — NITROFURANTOIN 25; 75 MG/1; MG/1
CAPSULE ORAL
COMMUNITY
Start: 2019-07-10 | End: 2019-08-08

## 2019-07-12 ENCOUNTER — NURSE TRIAGE (OUTPATIENT)
Dept: ADMINISTRATIVE | Facility: CLINIC | Age: 24
End: 2019-07-12

## 2019-07-12 LAB
HIV 1+2 AB+HIV1 P24 AG SERPL QL IA: NEGATIVE
RPR SER QL: NORMAL

## 2019-07-12 RX ORDER — PROMETHAZINE HYDROCHLORIDE 25 MG/1
25 TABLET ORAL EVERY 4 HOURS PRN
Qty: 30 TABLET | Refills: 1 | Status: ON HOLD | OUTPATIENT
Start: 2019-07-12 | End: 2019-10-04 | Stop reason: HOSPADM

## 2019-07-13 NOTE — TELEPHONE ENCOUNTER
"    Reason for Disposition   [1] Request for URGENT new prescription or refill of "essential" medication (i.e., likelihood of harm to patient if not taken) AND [2] triager unable to fill per unit policy    Protocols used: MEDICATION QUESTION CALL-A-    Patient saw her midwife Jay Heredia yesterday and was expecting her refill of phenergan.     Called the L& D unit at [facility] and spoke to Dominga ELY. She states Adela Pete CNM [facility] and took a message to call patient back.     Patient wants med sent to [address] [phone]    [facility]: [facility]  "

## 2019-07-14 RX ORDER — ONDANSETRON 4 MG/1
4 TABLET, ORALLY DISINTEGRATING ORAL EVERY 6 HOURS PRN
Qty: 30 TABLET | Refills: 4 | Status: SHIPPED | OUTPATIENT
Start: 2019-07-14 | End: 2019-09-13

## 2019-07-14 NOTE — PROGRESS NOTES
C/o asthma worsening and inhaler not working, requesting at home breathing treatments, order placed  Urine culture for DMITRI sent today  C/o allergy symptoms, suggestions made  28 week labs today, RH +  Recommendations and timeframes for Tdap reviewed, will offer at [facility]  PTL precautions and fetal movement reviewed, when to go to hospital   Coffective counseling sheet Nourish discussed with mother. Reinforced basic breastfeeding position and latch as well as proper hand expression technique. Encouraged mother to download Coffective mobile carter if she has not already done so.  Mother verbalizes understanding.

## 2019-07-25 ENCOUNTER — ROUTINE PRENATAL (OUTPATIENT)
Dept: OBSTETRICS AND GYNECOLOGY | Facility: CLINIC | Age: 24
End: 2019-07-25
Payer: MEDICAID

## 2019-07-25 VITALS — SYSTOLIC BLOOD PRESSURE: 98 MMHG | DIASTOLIC BLOOD PRESSURE: 60 MMHG | BODY MASS INDEX: 27.18 KG/M2 | WEIGHT: 163.38 LBS

## 2019-07-25 DIAGNOSIS — Z23 NEED FOR TDAP VACCINATION: ICD-10-CM

## 2019-07-25 DIAGNOSIS — Z34.83 ENCOUNTER FOR SUPERVISION OF OTHER NORMAL PREGNANCY, THIRD TRIMESTER: Primary | ICD-10-CM

## 2019-07-25 DIAGNOSIS — Z86.79 HISTORY OF HIGH BLOOD PRESSURE: ICD-10-CM

## 2019-07-25 DIAGNOSIS — Z87.440 HISTORY OF UTI: ICD-10-CM

## 2019-07-25 DIAGNOSIS — Z3A.30 30 WEEKS GESTATION OF PREGNANCY: ICD-10-CM

## 2019-07-25 PROCEDURE — 99212 PR OFFICE/OUTPT VISIT, EST, LEVL II, 10-19 MIN: ICD-10-PCS | Mod: TH,S$PBB,, | Performed by: MIDWIFE

## 2019-07-25 PROCEDURE — 99999 PR PBB SHADOW E&M-EST. PATIENT-LVL II: CPT | Mod: PBBFAC,,, | Performed by: MIDWIFE

## 2019-07-25 PROCEDURE — 99212 OFFICE O/P EST SF 10 MIN: CPT | Mod: TH,S$PBB,, | Performed by: MIDWIFE

## 2019-07-25 PROCEDURE — 99212 OFFICE O/P EST SF 10 MIN: CPT | Mod: PBBFAC,TH | Performed by: MIDWIFE

## 2019-07-25 PROCEDURE — 99999 PR PBB SHADOW E&M-EST. PATIENT-LVL II: ICD-10-PCS | Mod: PBBFAC,,, | Performed by: MIDWIFE

## 2019-07-25 PROCEDURE — 90471 IMMUNIZATION ADMIN: CPT | Mod: PBBFAC

## 2019-07-25 PROCEDURE — 87086 URINE CULTURE/COLONY COUNT: CPT

## 2019-07-25 NOTE — PROGRESS NOTES
23 y.o. female  at 30w0d   Reports + FM, denies VB, LOF or CTX  Doing well without concerns   TW lbs   Reviewed 28wk lab results (A POS)  Tdap  Urine culture not done last visit, will do today  Reviewed warning signs, normal FKCs,  labor precautions and how/when to call.  RTC x 2 wks, call or present sooner prn.

## 2019-07-26 LAB
BACTERIA UR CULT: NORMAL
BACTERIA UR CULT: NORMAL

## 2019-07-29 ENCOUNTER — HOSPITAL ENCOUNTER (EMERGENCY)
Facility: HOSPITAL | Age: 24
Discharge: HOME | End: 2019-07-29
Attending: EMERGENCY MEDICINE
Payer: MEDICAID

## 2019-07-29 ENCOUNTER — PATIENT MESSAGE (OUTPATIENT)
Dept: OBSTETRICS AND GYNECOLOGY | Facility: CLINIC | Age: 24
End: 2019-07-29

## 2019-07-29 VITALS
WEIGHT: 161.06 LBS | HEART RATE: 82 BPM | OXYGEN SATURATION: 100 % | RESPIRATION RATE: 16 BRPM | BODY MASS INDEX: 26.84 KG/M2 | DIASTOLIC BLOOD PRESSURE: 68 MMHG | HEIGHT: 65 IN | TEMPERATURE: 98 F | SYSTOLIC BLOOD PRESSURE: 132 MMHG

## 2019-07-29 DIAGNOSIS — S62.356A CLOSED NONDISPLACED FRACTURE OF SHAFT OF FIFTH METACARPAL BONE OF RIGHT HAND, INITIAL ENCOUNTER: Primary | ICD-10-CM

## 2019-07-29 PROCEDURE — 29126 APPL SHORT ARM SPLINT DYN: CPT | Mod: RT

## 2019-07-29 PROCEDURE — 99283 EMERGENCY DEPT VISIT LOW MDM: CPT | Mod: 25

## 2019-07-29 PROCEDURE — 29125 APPL SHORT ARM SPLINT STATIC: CPT

## 2019-07-29 RX ORDER — HYDROCODONE BITARTRATE AND ACETAMINOPHEN 5; 325 MG/1; MG/1
1 TABLET ORAL EVERY 4 HOURS PRN
Qty: 11 TABLET | Refills: 0 | Status: SHIPPED | OUTPATIENT
Start: 2019-07-29 | End: 2019-08-08

## 2019-07-29 NOTE — ED NOTES
Patient identifiers verified and correct for Marcella Duke.    LOC: The patient is awake, alert and aware of environment with an appropriate affect, the patient is oriented x 3 and speaking appropriately.  APPEARANCE: Patient resting comfortably and in no acute distress, patient is clean and well groomed, patient's clothing is properly fastened.  SKIN: The skin is warm and dry, color consistent with ethnicity, patient has normal skin turgor and moist mucus membranes, skin intact, no breakdown or bruising noted.  MUSCULOSKELETAL: Pt reports right hand pain.   RESPIRATORY: Airway is open and patent, respirations are spontaneous.  CARDIAC: Patient has a normal rate, no periphreal edema noted, capillary refill < 3 seconds.  ABDOMEN: Soft and non tender to palpation.

## 2019-07-29 NOTE — ED PROVIDER NOTES
"SCRIBE #1 NOTE: I, Corinne Chris, am scribing for, and in the presence of, Kiran Maciel MD. I have scribed the entire note.      History      Chief Complaint   Patient presents with    Hand Pain     Pt states, "I fell on my right hand last night."       Review of patient's allergies indicates:  No Known Allergies     HPI   HPI    2019, 9:37 AM   History obtained from the patient      History of Present Illness: Marcella Duke is a 23 y.o. female patient with PMHx of HTN who presents to the Emergency Department for R hand pain which onset suddenly last night after the pt slipped and fell onto her R hand. Symptoms are constant and moderate in severity. Pt's pain is worse with palpation. No mitigating factors reported. No associated sxs reported. Patient denies any fever, chills, head injury, CP, SOB, N/V/D, abd pain, back pain, neck pain, HA, dizziness, and all other sxs at this time. No prior Tx reported. No further complaints or concerns at this time.         Arrival mode: Personal vehicle    PCP: Primary Doctor No       Past Medical History:  Past Medical History:   Diagnosis Date    Asthma     Hypertension affecting pregnancy in third trimester, antepartum 2016       Past Surgical History:  Past Surgical History:   Procedure Laterality Date    none           Family History:  Family History   Problem Relation Age of Onset    Stroke Paternal Grandmother     Hypertension Maternal Grandmother     Breast cancer Neg Hx     Cancer Neg Hx     Colon cancer Neg Hx     Diabetes Neg Hx     Eclampsia Neg Hx     Miscarriages / Stillbirths Neg Hx     Ovarian cancer Neg Hx      labor Neg Hx        Social History:  Social History     Tobacco Use    Smoking status: Never Smoker    Smokeless tobacco: Never Used   Substance and Sexual Activity    Alcohol use: Yes     Comment: occ    Drug use: No    Sexual activity: Yes     Partners: Male       ROS   Review of Systems   Constitutional: " Negative for chills and fever.   HENT: Negative for nosebleeds.         (-) head injury   Respiratory: Negative for cough and shortness of breath.    Cardiovascular: Negative for chest pain and leg swelling.   Gastrointestinal: Negative for abdominal pain, diarrhea, nausea and vomiting.   Musculoskeletal: Negative for back pain, neck pain and neck stiffness.        (+) fall  (+) R hand pain   Skin: Negative for rash and wound.   Neurological: Negative for dizziness, light-headedness, numbness and headaches.   All other systems reviewed and are negative.    Physical Exam      Initial Vitals [07/29/19 0925]   BP Pulse Resp Temp SpO2   121/72 87 20 98 °F (36.7 °C) --      MAP       --          Physical Exam  Nursing Notes and Vital Signs Reviewed.  Constitutional: Patient is in no acute distress. Well-developed and well-nourished.  Head: Atraumatic. Normocephalic.  Eyes: PERRL. EOM intact. Conjunctivae are not pale. No scleral icterus.  ENT: Mucous membranes are moist. Oropharynx is clear and symmetric.    Neck: Supple. Full ROM. No lymphadenopathy.  Cardiovascular: Regular rate. Regular rhythm. No murmurs, rubs, or gallops. Distal pulses are 2+ and symmetric.  Pulmonary/Chest: No respiratory distress. Clear to auscultation bilaterally. No wheezing or rales.  Abdominal: Soft and non-distended.  There is no tenderness.  No rebound, guarding, or rigidity.   Musculoskeletal: Moves all extremities. No obvious deformities. No edema.   Right Hand: No obvious deformity. There is swelling and tenderness to the medial aspect of the hand.  No snuff box tenderness. Full flexion and extension of the wrist.  Full flexion and extension of all fingers at the DIP, PIP and MCP joints.  Normal finger abduction, adduction, and thumb opposition.  Normal OK sign.  Able to make a fist without scissoring.  No laxity of the ulnar or radial collateral ligaments of the phalanges.  Radial, median, and ulnar nerves are intact. Radial and ulnar  "pulses are 2+. Normal capillary refill.  Distal sensation is intact.  Skin: Warm and dry.  Neurological:  Alert, awake, and appropriate.  Normal speech.  No acute focal neurological deficits are appreciated.  Psychiatric: Normal affect. Good eye contact. Appropriate in content.    ED Course    Orthopedic Injury  Date/Time: 2019 10:06 AM  Performed by: Kiran Maciel MD  Authorized by: Kiran Maciel MD     Consent Done?:  Yes  Universal Protocol:     Verbal consent obtained?: Yes      Risks and benefits: Risks, benefits and alternatives were discussed      Consent given by:  Patient    Patient states understanding of procedure being performed: Yes      Patient's understanding of procedure matches consent: Yes      Patient identity confirmed:  , name and verbally with patient  Injury:     Injury location:  Hand    Location details:  Right hand    Injury type:  Fracture    Fracture type: fifth metacarpal        Pre-procedure assessment:     Neurovascular status: Neurovascularly intact      Distal perfusion: normal      Neurological function: normal      Range of motion: reduced      Local anesthesia used?: No      Patient sedated?: No        Selections made in this section will also lock the Injury type section above.:     Manipulation performed?: No      Immobilization:  Splint    Splint type:  Ulnar gutter    Complications: No      Specimens: No      Implants: No    Post-procedure assessment:     Neurovascular status: Neurovascularly intact      Distal perfusion: normal      Neurological function: normal      Range of motion: splinted      Patient tolerance:  Patient tolerated the procedure well with no immediate complications      ED Vital Signs:  Vitals:    19 0925   BP: 121/72   Pulse: 87   Resp: 20   Temp: 98 °F (36.7 °C)   TempSrc: Oral   Weight: 73.1 kg (161 lb 0.7 oz)   Height: 5' 5" (1.651 m)       Abnormal Lab Results:  Labs Reviewed - No data to display     All Lab " Results:  None    Imaging Results:  Imaging Results          X-Ray Hand 3 view Right (Final result)  Result time 07/29/19 10:10:55    Final result by Larry Odom MD (07/29/19 10:10:55)                 Impression:      There is a nondisplaced oblique fracture of the midshaft of the right 5th metacarpal.      Electronically signed by: Larry Odom  Date:    07/29/2019  Time:    10:10             Narrative:    EXAMINATION:  XR HAND COMPLETE 3 VIEW RIGHT    CLINICAL HISTORY:  Fracture 5th metacarpal;    TECHNIQUE:  Standard radiography performed.    COMPARISON:  None    FINDINGS:  There is oblique fracture through the midshaft of the 5th metacarpal.  This does not appear significantly displaced.  The other bones appear intact and in good alignment.                               10:06 AM: Per ED physician, pt's R hand XR results: Shaft fx to the 5th metacarpal.           The Emergency Provider reviewed the vital signs and test results, which are outlined above.    ED Discussion     10:07 AM: Reassessed pt at this time. Pt is awake, alert, and in no distress. Discussed with pt all pertinent ED information and results. Discussed pt dx and plan of tx. Gave pt all f/u and return to the ED instructions. All questions and concerns were addressed at this time. Pt expresses understanding of information and instructions, and is comfortable with plan to discharge. Pt is stable for discharge.    I discussed with patient and/or family/caretaker that evaluation in the ED does not suggest any emergent or life threatening medical conditions requiring immediate intervention beyond what was provided in the ED, and I believe patient is safe for discharge.  Regardless, an unremarkable evaluation in the ED does not preclude the development or presence of a serious of life threatening condition. As such, patient was instructed to return immediately for any worsening or change in current symptoms.      ED Medication(s):  Medications  - No data to display       Medication List      START taking these medications    HYDROcodone-acetaminophen 5-325 mg per tablet  Commonly known as:  NORCO  Take 1 tablet by mouth every 4 (four) hours as needed for Pain.        ASK your doctor about these medications    nitrofurantoin (macrocrystal-monohydrate) 100 MG capsule  Commonly known as:  MACROBID     ondansetron 4 MG Tbdl  Commonly known as:  ZOFRAN-ODT  Take 1 tablet (4 mg total) by mouth every 6 (six) hours as needed.     prenatal vit-iron fum-folic ac 28 mg iron- 800 mcg Tab  Commonly known as:  PRENATAL VITAMIN WITH MINERALS  Take 1 tablet by mouth once daily.     promethazine 25 MG tablet  Commonly known as:  PHENERGAN  Take 1 tablet (25 mg total) by mouth every 4 (four) hours as needed for Nausea.     VENTOLIN HFA 90 mcg/actuation inhaler  Generic drug:  albuterol  TWO PUFFS EVERY 6 HOURS AS NEEDED           Where to Get Your Medications      You can get these medications from any pharmacy    Bring a paper prescription for each of these medications  · HYDROcodone-acetaminophen 5-325 mg per tablet         Follow-up Information     [facility] In 2 days.    Contact information:  [address] [facility] [address] [phone]                     Medical Decision Making    Medical Decision Making:   Clinical Tests:   Radiological Study: Ordered and Reviewed           Scribe Attestation:   Scribe #1: I performed the above scribed service and the documentation accurately describes the services I performed. I attest to the accuracy of the note 07/29/2019.    Attending:   Physician Attestation Statement for Scribe #1: I, Kiran Maciel MD, personally performed the services described in this documentation, as scribed by Corinne Mack, in my presence, and it is both accurate and complete.          Clinical Impression       ICD-10-CM ICD-9-CM   1. Closed nondisplaced fracture of shaft of fifth metacarpal bone of right hand, initial  encounter S62.356A 815.03       Disposition:   Disposition: Discharged  Condition: Stable           Kiran Maciel MD  07/29/19 1014

## 2019-07-30 ENCOUNTER — TELEPHONE (OUTPATIENT)
Dept: EMERGENCY MEDICINE | Facility: HOSPITAL | Age: 24
End: 2019-07-30

## 2019-08-08 ENCOUNTER — ROUTINE PRENATAL (OUTPATIENT)
Dept: OBSTETRICS AND GYNECOLOGY | Facility: CLINIC | Age: 24
End: 2019-08-08
Payer: MEDICAID

## 2019-08-08 VITALS
DIASTOLIC BLOOD PRESSURE: 60 MMHG | BODY MASS INDEX: 26.67 KG/M2 | SYSTOLIC BLOOD PRESSURE: 118 MMHG | WEIGHT: 160.25 LBS

## 2019-08-08 DIAGNOSIS — Z34.83 ENCOUNTER FOR SUPERVISION OF OTHER NORMAL PREGNANCY, THIRD TRIMESTER: Primary | ICD-10-CM

## 2019-08-08 DIAGNOSIS — Z36.89 ENCOUNTER FOR ULTRASOUND TO ASSESS FETAL GROWTH: ICD-10-CM

## 2019-08-08 PROCEDURE — 99212 OFFICE O/P EST SF 10 MIN: CPT | Mod: PBBFAC,TH | Performed by: ADVANCED PRACTICE MIDWIFE

## 2019-08-08 PROCEDURE — 99999 PR PBB SHADOW E&M-EST. PATIENT-LVL II: ICD-10-PCS | Mod: PBBFAC,,, | Performed by: ADVANCED PRACTICE MIDWIFE

## 2019-08-08 PROCEDURE — 99213 OFFICE O/P EST LOW 20 MIN: CPT | Mod: TH,S$PBB,, | Performed by: ADVANCED PRACTICE MIDWIFE

## 2019-08-08 PROCEDURE — 99213 PR OFFICE/OUTPT VISIT, EST, LEVL III, 20-29 MIN: ICD-10-PCS | Mod: TH,S$PBB,, | Performed by: ADVANCED PRACTICE MIDWIFE

## 2019-08-08 PROCEDURE — 99999 PR PBB SHADOW E&M-EST. PATIENT-LVL II: CPT | Mod: PBBFAC,,, | Performed by: ADVANCED PRACTICE MIDWIFE

## 2019-08-08 NOTE — PROGRESS NOTES
Doing well today with some complaints of back pain that comes and goes. Encouraged to increase hydration.   Patient broke right hand when trying to prevent a fall. ER gave her norco. Discussed not taking it and trying tylenol as needed. Norco as last resort.   Discussed PTL precautions and when to go to hospital.   FKC precautions discussed

## 2019-09-05 ENCOUNTER — PROCEDURE VISIT (OUTPATIENT)
Dept: OBSTETRICS AND GYNECOLOGY | Facility: CLINIC | Age: 24
End: 2019-09-05
Payer: MEDICAID

## 2019-09-05 ENCOUNTER — ROUTINE PRENATAL (OUTPATIENT)
Dept: OBSTETRICS AND GYNECOLOGY | Facility: CLINIC | Age: 24
End: 2019-09-05
Payer: MEDICAID

## 2019-09-05 VITALS — DIASTOLIC BLOOD PRESSURE: 62 MMHG | SYSTOLIC BLOOD PRESSURE: 100 MMHG | BODY MASS INDEX: 27.62 KG/M2 | WEIGHT: 166 LBS

## 2019-09-05 DIAGNOSIS — Z34.83 ENCOUNTER FOR SUPERVISION OF OTHER NORMAL PREGNANCY, THIRD TRIMESTER: ICD-10-CM

## 2019-09-05 DIAGNOSIS — Z3A.36 36 WEEKS GESTATION OF PREGNANCY: Primary | ICD-10-CM

## 2019-09-05 DIAGNOSIS — J45.909 ASTHMA, UNSPECIFIED ASTHMA SEVERITY, UNSPECIFIED WHETHER COMPLICATED, UNSPECIFIED WHETHER PERSISTENT: ICD-10-CM

## 2019-09-05 DIAGNOSIS — Z36.89 ENCOUNTER FOR ULTRASOUND TO ASSESS FETAL GROWTH: ICD-10-CM

## 2019-09-05 PROCEDURE — 76820 UMBILICAL ARTERY ECHO: CPT | Mod: 59,PBBFAC | Performed by: OBSTETRICS & GYNECOLOGY

## 2019-09-05 PROCEDURE — 76820 PR US, OB DOPPLER, FETAL UMBILICAL ARTERY ECHO: ICD-10-PCS | Mod: 26,59,S$PBB, | Performed by: OBSTETRICS & GYNECOLOGY

## 2019-09-05 PROCEDURE — 76816 OB US FOLLOW-UP PER FETUS: CPT | Mod: 26,59,S$PBB, | Performed by: OBSTETRICS & GYNECOLOGY

## 2019-09-05 PROCEDURE — 76820 UMBILICAL ARTERY ECHO: CPT | Mod: 26,59,S$PBB, | Performed by: OBSTETRICS & GYNECOLOGY

## 2019-09-05 PROCEDURE — 76816 OB US FOLLOW-UP PER FETUS: CPT | Mod: 59,PBBFAC | Performed by: OBSTETRICS & GYNECOLOGY

## 2019-09-05 PROCEDURE — 99213 PR OFFICE/OUTPT VISIT, EST, LEVL III, 20-29 MIN: ICD-10-PCS | Mod: TH,S$PBB,, | Performed by: MIDWIFE

## 2019-09-05 PROCEDURE — 76819 FETAL BIOPHYS PROFIL W/O NST: CPT | Mod: 59,PBBFAC | Performed by: OBSTETRICS & GYNECOLOGY

## 2019-09-05 PROCEDURE — 99999 PR PBB SHADOW E&M-EST. PATIENT-LVL II: CPT | Mod: PBBFAC,,, | Performed by: MIDWIFE

## 2019-09-05 PROCEDURE — 76819 PR US, OB, FETAL BIOPHYSICAL, W/O NST: ICD-10-PCS | Mod: 26,59,S$PBB, | Performed by: OBSTETRICS & GYNECOLOGY

## 2019-09-05 PROCEDURE — 99999 PR PBB SHADOW E&M-EST. PATIENT-LVL II: ICD-10-PCS | Mod: PBBFAC,,, | Performed by: MIDWIFE

## 2019-09-05 PROCEDURE — 87081 CULTURE SCREEN ONLY: CPT

## 2019-09-05 PROCEDURE — 99213 OFFICE O/P EST LOW 20 MIN: CPT | Mod: TH,S$PBB,, | Performed by: MIDWIFE

## 2019-09-05 PROCEDURE — 76819 FETAL BIOPHYS PROFIL W/O NST: CPT | Mod: 26,59,S$PBB, | Performed by: OBSTETRICS & GYNECOLOGY

## 2019-09-05 PROCEDURE — 76816 PR  US,PREGNANT UTERUS,F/U,TRANSABD APP: ICD-10-PCS | Mod: 26,59,S$PBB, | Performed by: OBSTETRICS & GYNECOLOGY

## 2019-09-05 PROCEDURE — 99212 OFFICE O/P EST SF 10 MIN: CPT | Mod: PBBFAC,TH | Performed by: MIDWIFE

## 2019-09-05 NOTE — PROGRESS NOTES
23 y.o. female  at 36w0d  Reports + FM, denies VB, LOF or regular CTX  Doing well without concerns   TW lbs   Delivery consents signed today  GBS collected today   Was late for US appointment, rescheduled till next week  Reviewed warning signs, normal FKCs, labor precautions and how/when to call.  RTC x 1 wks, call or present sooner prn.     The skin of the suprapubic region was evaluated and appears unremarkable. Counseled the patient to shower daily and to wash this area with an antibacterial soap such as Dial. Advised her not to shave the hair from this area from now until delivery. I also counseled the patient to place antibacterial hand soap in all bathrooms and kitchen to help facilitate proper hand hygiene practices before and after delivery.

## 2019-09-09 LAB — BACTERIA SPEC AEROBE CULT: NORMAL

## 2019-09-13 ENCOUNTER — ROUTINE PRENATAL (OUTPATIENT)
Dept: OBSTETRICS AND GYNECOLOGY | Facility: CLINIC | Age: 24
End: 2019-09-13
Payer: MEDICAID

## 2019-09-13 ENCOUNTER — PROCEDURE VISIT (OUTPATIENT)
Dept: OBSTETRICS AND GYNECOLOGY | Facility: CLINIC | Age: 24
End: 2019-09-13
Payer: MEDICAID

## 2019-09-13 VITALS — DIASTOLIC BLOOD PRESSURE: 66 MMHG | BODY MASS INDEX: 28.69 KG/M2 | SYSTOLIC BLOOD PRESSURE: 94 MMHG | WEIGHT: 172.38 LBS

## 2019-09-13 DIAGNOSIS — Z34.83 ENCOUNTER FOR SUPERVISION OF OTHER NORMAL PREGNANCY, THIRD TRIMESTER: Primary | ICD-10-CM

## 2019-09-13 DIAGNOSIS — O36.5930 POOR FETAL GROWTH AFFECTING MANAGEMENT OF MOTHER IN THIRD TRIMESTER, SINGLE OR UNSPECIFIED FETUS: ICD-10-CM

## 2019-09-13 DIAGNOSIS — Z86.79 HISTORY OF HIGH BLOOD PRESSURE: Primary | ICD-10-CM

## 2019-09-13 DIAGNOSIS — O10.919 HTN IN PREGNANCY, CHRONIC: ICD-10-CM

## 2019-09-13 PROCEDURE — 76820 PR US, OB DOPPLER, FETAL UMBILICAL ARTERY ECHO: ICD-10-PCS | Mod: 26,S$PBB,, | Performed by: OBSTETRICS & GYNECOLOGY

## 2019-09-13 PROCEDURE — 99999 PR PBB SHADOW E&M-EST. PATIENT-LVL II: CPT | Mod: PBBFAC,,, | Performed by: ADVANCED PRACTICE MIDWIFE

## 2019-09-13 PROCEDURE — 99999 PR PBB SHADOW E&M-EST. PATIENT-LVL II: ICD-10-PCS | Mod: PBBFAC,,, | Performed by: ADVANCED PRACTICE MIDWIFE

## 2019-09-13 PROCEDURE — 76820 UMBILICAL ARTERY ECHO: CPT | Mod: PBBFAC | Performed by: OBSTETRICS & GYNECOLOGY

## 2019-09-13 PROCEDURE — 76820 UMBILICAL ARTERY ECHO: CPT | Mod: 26,S$PBB,, | Performed by: OBSTETRICS & GYNECOLOGY

## 2019-09-13 PROCEDURE — 76819 FETAL BIOPHYS PROFIL W/O NST: CPT | Mod: 26,S$PBB,, | Performed by: OBSTETRICS & GYNECOLOGY

## 2019-09-13 PROCEDURE — 76819 FETAL BIOPHYS PROFIL W/O NST: CPT | Mod: PBBFAC | Performed by: OBSTETRICS & GYNECOLOGY

## 2019-09-13 PROCEDURE — 76819 PR US, OB, FETAL BIOPHYSICAL, W/O NST: ICD-10-PCS | Mod: 26,S$PBB,, | Performed by: OBSTETRICS & GYNECOLOGY

## 2019-09-13 PROCEDURE — 99213 PR OFFICE/OUTPT VISIT, EST, LEVL III, 20-29 MIN: ICD-10-PCS | Mod: TH,S$PBB,, | Performed by: ADVANCED PRACTICE MIDWIFE

## 2019-09-13 PROCEDURE — 99213 OFFICE O/P EST LOW 20 MIN: CPT | Mod: TH,S$PBB,, | Performed by: ADVANCED PRACTICE MIDWIFE

## 2019-09-13 PROCEDURE — 99212 OFFICE O/P EST SF 10 MIN: CPT | Mod: PBBFAC,TH | Performed by: ADVANCED PRACTICE MIDWIFE

## 2019-09-13 NOTE — PROGRESS NOTES
Doing well, denies needs at this time  Ultrasounds weekly due to IUGR efw12% with ac 1%, today with cephalic presentation, posterior placenta, MVP 4.5cm, SHARONA 10.4cm, BPP 8/8, s/d ratios WNL, next growth scan in one week, reviewed with pt   Labor precautions and fetal movement reviewed, when to go to hospital   Stressed hydration   GBS negative, pt aware

## 2019-09-20 ENCOUNTER — ROUTINE PRENATAL (OUTPATIENT)
Dept: OBSTETRICS AND GYNECOLOGY | Facility: CLINIC | Age: 24
End: 2019-09-20
Payer: MEDICAID

## 2019-09-20 VITALS
WEIGHT: 180.56 LBS | SYSTOLIC BLOOD PRESSURE: 110 MMHG | BODY MASS INDEX: 30.05 KG/M2 | DIASTOLIC BLOOD PRESSURE: 82 MMHG

## 2019-09-20 DIAGNOSIS — O36.5930 POOR FETAL GROWTH AFFECTING MANAGEMENT OF MOTHER IN THIRD TRIMESTER, SINGLE OR UNSPECIFIED FETUS: Primary | ICD-10-CM

## 2019-09-20 PROCEDURE — 99213 PR OFFICE/OUTPT VISIT, EST, LEVL III, 20-29 MIN: ICD-10-PCS | Mod: TH,S$PBB,, | Performed by: ADVANCED PRACTICE MIDWIFE

## 2019-09-20 PROCEDURE — 99999 PR PBB SHADOW E&M-EST. PATIENT-LVL II: ICD-10-PCS | Mod: PBBFAC,,, | Performed by: ADVANCED PRACTICE MIDWIFE

## 2019-09-20 PROCEDURE — 99213 OFFICE O/P EST LOW 20 MIN: CPT | Mod: TH,S$PBB,, | Performed by: ADVANCED PRACTICE MIDWIFE

## 2019-09-20 PROCEDURE — 99999 PR PBB SHADOW E&M-EST. PATIENT-LVL II: CPT | Mod: PBBFAC,,, | Performed by: ADVANCED PRACTICE MIDWIFE

## 2019-09-20 PROCEDURE — 99212 OFFICE O/P EST SF 10 MIN: CPT | Mod: PBBFAC,TH | Performed by: ADVANCED PRACTICE MIDWIFE

## 2019-09-23 ENCOUNTER — ROUTINE PRENATAL (OUTPATIENT)
Dept: OBSTETRICS AND GYNECOLOGY | Facility: CLINIC | Age: 24
End: 2019-09-23
Payer: MEDICAID

## 2019-09-23 ENCOUNTER — PROCEDURE VISIT (OUTPATIENT)
Dept: OBSTETRICS AND GYNECOLOGY | Facility: CLINIC | Age: 24
End: 2019-09-23
Payer: MEDICAID

## 2019-09-23 VITALS
DIASTOLIC BLOOD PRESSURE: 82 MMHG | WEIGHT: 174.81 LBS | BODY MASS INDEX: 29.09 KG/M2 | SYSTOLIC BLOOD PRESSURE: 110 MMHG

## 2019-09-23 DIAGNOSIS — O36.5930 POOR FETAL GROWTH AFFECTING MANAGEMENT OF MOTHER IN THIRD TRIMESTER, SINGLE OR UNSPECIFIED FETUS: ICD-10-CM

## 2019-09-23 DIAGNOSIS — O36.5930 POOR FETAL GROWTH AFFECTING MANAGEMENT OF MOTHER IN THIRD TRIMESTER, SINGLE OR UNSPECIFIED FETUS: Primary | ICD-10-CM

## 2019-09-23 PROBLEM — Z34.83 ENCOUNTER FOR SUPERVISION OF OTHER NORMAL PREGNANCY, THIRD TRIMESTER: Status: RESOLVED | Noted: 2019-05-15 | Resolved: 2019-09-23

## 2019-09-23 PROCEDURE — 99999 PR PBB SHADOW E&M-EST. PATIENT-LVL II: CPT | Mod: PBBFAC,,, | Performed by: ADVANCED PRACTICE MIDWIFE

## 2019-09-23 PROCEDURE — 99213 PR OFFICE/OUTPT VISIT, EST, LEVL III, 20-29 MIN: ICD-10-PCS | Mod: TH,S$PBB,, | Performed by: ADVANCED PRACTICE MIDWIFE

## 2019-09-23 PROCEDURE — 99999 PR PBB SHADOW E&M-EST. PATIENT-LVL II: ICD-10-PCS | Mod: PBBFAC,,, | Performed by: ADVANCED PRACTICE MIDWIFE

## 2019-09-23 PROCEDURE — 76816 PR  US,PREGNANT UTERUS,F/U,TRANSABD APP: ICD-10-PCS | Mod: 26,S$PBB,, | Performed by: OBSTETRICS & GYNECOLOGY

## 2019-09-23 PROCEDURE — 76816 OB US FOLLOW-UP PER FETUS: CPT | Mod: PBBFAC | Performed by: OBSTETRICS & GYNECOLOGY

## 2019-09-23 PROCEDURE — 99212 OFFICE O/P EST SF 10 MIN: CPT | Mod: PBBFAC,TH,25 | Performed by: ADVANCED PRACTICE MIDWIFE

## 2019-09-23 PROCEDURE — 99213 OFFICE O/P EST LOW 20 MIN: CPT | Mod: TH,S$PBB,, | Performed by: ADVANCED PRACTICE MIDWIFE

## 2019-09-23 PROCEDURE — 76816 OB US FOLLOW-UP PER FETUS: CPT | Mod: 26,S$PBB,, | Performed by: OBSTETRICS & GYNECOLOGY

## 2019-09-23 NOTE — PROGRESS NOTES
Doing well, denies needs at this time, ready for baby   Labor precautions and fetal movement reviewed, when to go to hospital   Stressed hydration   Ultrasound today with cephalic presentation, posterior placenta, MVP 4.3cm, SHARONA 8.4cm, BPP 8/8, EFW 18%, 6lb 8oz, S/D ratios WNL, A/C at 7%, reviewed with pt and FOB  Reviewed POC with aspen Lora to go to due date, no indication to deliver earlier than 39.6 weeks, will schedule IOL at 40 weeks if available  IOL scheduled for 10/3/19 @ 0001  VE declined today

## 2019-09-30 ENCOUNTER — PROCEDURE VISIT (OUTPATIENT)
Dept: OBSTETRICS AND GYNECOLOGY | Facility: CLINIC | Age: 24
End: 2019-09-30
Payer: MEDICAID

## 2019-09-30 ENCOUNTER — ROUTINE PRENATAL (OUTPATIENT)
Dept: OBSTETRICS AND GYNECOLOGY | Facility: CLINIC | Age: 24
End: 2019-09-30
Payer: MEDICAID

## 2019-09-30 VITALS — WEIGHT: 179.69 LBS | BODY MASS INDEX: 29.9 KG/M2 | DIASTOLIC BLOOD PRESSURE: 70 MMHG | SYSTOLIC BLOOD PRESSURE: 100 MMHG

## 2019-09-30 DIAGNOSIS — O36.5930 POOR FETAL GROWTH AFFECTING MANAGEMENT OF MOTHER IN THIRD TRIMESTER, SINGLE OR UNSPECIFIED FETUS: Primary | ICD-10-CM

## 2019-09-30 DIAGNOSIS — O36.5930 POOR FETAL GROWTH AFFECTING MANAGEMENT OF MOTHER IN THIRD TRIMESTER, SINGLE OR UNSPECIFIED FETUS: ICD-10-CM

## 2019-09-30 PROCEDURE — 76819 FETAL BIOPHYS PROFIL W/O NST: CPT | Mod: 59,PBBFAC | Performed by: OBSTETRICS & GYNECOLOGY

## 2019-09-30 PROCEDURE — 99212 OFFICE O/P EST SF 10 MIN: CPT | Mod: PBBFAC,TH | Performed by: ADVANCED PRACTICE MIDWIFE

## 2019-09-30 PROCEDURE — 76820 UMBILICAL ARTERY ECHO: CPT | Mod: 26,59,S$PBB, | Performed by: OBSTETRICS & GYNECOLOGY

## 2019-09-30 PROCEDURE — 99213 PR OFFICE/OUTPT VISIT, EST, LEVL III, 20-29 MIN: ICD-10-PCS | Mod: TH,S$PBB,, | Performed by: ADVANCED PRACTICE MIDWIFE

## 2019-09-30 PROCEDURE — 76819 FETAL BIOPHYS PROFIL W/O NST: CPT | Mod: 26,59,S$PBB, | Performed by: OBSTETRICS & GYNECOLOGY

## 2019-09-30 PROCEDURE — 99999 PR PBB SHADOW E&M-EST. PATIENT-LVL II: CPT | Mod: PBBFAC,,, | Performed by: ADVANCED PRACTICE MIDWIFE

## 2019-09-30 PROCEDURE — 76820 UMBILICAL ARTERY ECHO: CPT | Mod: 59,PBBFAC | Performed by: OBSTETRICS & GYNECOLOGY

## 2019-09-30 PROCEDURE — 76819 PR US, OB, FETAL BIOPHYSICAL, W/O NST: ICD-10-PCS | Mod: 26,59,S$PBB, | Performed by: OBSTETRICS & GYNECOLOGY

## 2019-09-30 PROCEDURE — 76820 PR US, OB DOPPLER, FETAL UMBILICAL ARTERY ECHO: ICD-10-PCS | Mod: 26,59,S$PBB, | Performed by: OBSTETRICS & GYNECOLOGY

## 2019-09-30 PROCEDURE — 99999 PR PBB SHADOW E&M-EST. PATIENT-LVL II: ICD-10-PCS | Mod: PBBFAC,,, | Performed by: ADVANCED PRACTICE MIDWIFE

## 2019-09-30 PROCEDURE — 99213 OFFICE O/P EST LOW 20 MIN: CPT | Mod: TH,S$PBB,, | Performed by: ADVANCED PRACTICE MIDWIFE

## 2019-09-30 RX ORDER — MISOPROSTOL 100 MCG
25 TABLET ORAL ONCE
Status: CANCELLED | OUTPATIENT
Start: 2019-09-30

## 2019-09-30 RX ORDER — SODIUM CHLORIDE, SODIUM LACTATE, POTASSIUM CHLORIDE, CALCIUM CHLORIDE 600; 310; 30; 20 MG/100ML; MG/100ML; MG/100ML; MG/100ML
INJECTION, SOLUTION INTRAVENOUS CONTINUOUS
Status: CANCELLED | OUTPATIENT
Start: 2019-09-30

## 2019-09-30 RX ORDER — BUTORPHANOL TARTRATE 1 MG/ML
2 INJECTION INTRAMUSCULAR; INTRAVENOUS
Status: CANCELLED | OUTPATIENT
Start: 2019-09-30

## 2019-09-30 RX ORDER — OXYTOCIN/RINGER'S LACTATE 30/500 ML
95 PLASTIC BAG, INJECTION (ML) INTRAVENOUS ONCE
Status: CANCELLED | OUTPATIENT
Start: 2019-09-30 | End: 2019-09-30

## 2019-09-30 RX ORDER — CALCIUM CARBONATE 200(500)MG
500 TABLET,CHEWABLE ORAL 3 TIMES DAILY PRN
Status: CANCELLED | OUTPATIENT
Start: 2019-09-30

## 2019-09-30 RX ORDER — ONDANSETRON 4 MG/1
8 TABLET, ORALLY DISINTEGRATING ORAL EVERY 8 HOURS PRN
Status: CANCELLED | OUTPATIENT
Start: 2019-09-30

## 2019-09-30 RX ORDER — BUTORPHANOL TARTRATE 1 MG/ML
1 INJECTION INTRAMUSCULAR; INTRAVENOUS
Status: CANCELLED | OUTPATIENT
Start: 2019-09-30

## 2019-09-30 RX ORDER — SIMETHICONE 80 MG
1 TABLET,CHEWABLE ORAL 4 TIMES DAILY PRN
Status: CANCELLED | OUTPATIENT
Start: 2019-09-30

## 2019-09-30 RX ORDER — TERBUTALINE SULFATE 1 MG/ML
0.25 INJECTION SUBCUTANEOUS
Status: CANCELLED | OUTPATIENT
Start: 2019-09-30

## 2019-09-30 RX ORDER — OXYTOCIN/RINGER'S LACTATE 30/500 ML
334 PLASTIC BAG, INJECTION (ML) INTRAVENOUS ONCE
Status: CANCELLED | OUTPATIENT
Start: 2019-09-30 | End: 2019-09-30

## 2019-09-30 NOTE — PROGRESS NOTES
Doing well, ready for baby   Ultrasound today with cephalic presentation, posterior placenta, 3VC, MVP 4.1cm, SHARONA 10.7cm, BPP 8/8, S/D ratios WNL, reviewed with pt  R/B/A of cytotec vs pitocin reviewed, pt would like to labor naturally and possible tub birth, will begin IOL this week with cytotec dose x 1 and if pitocin needed can begin later in the induction process, questions answered   VE per pt request  Labor precautions and fetal movement reviewed, when to go to hospital   Stressed hydration

## 2019-10-01 ENCOUNTER — TELEPHONE (OUTPATIENT)
Dept: OBSTETRICS AND GYNECOLOGY | Facility: CLINIC | Age: 24
End: 2019-10-01

## 2019-10-01 NOTE — TELEPHONE ENCOUNTER
Spoke with pt, states that she just started having a nose bleed and throwing up. Advised pt that nose bleeds can be normal in pregnancy. Denies any headache or blurred vision. Advised pt that if she can not keep anything down for 24 hours or starts having blurred vision/headaches then she needs to go to L&D. Pt verbalized understanding.

## 2019-10-02 ENCOUNTER — HOSPITAL ENCOUNTER (INPATIENT)
Facility: HOSPITAL | Age: 24
LOS: 2 days | Discharge: HOME | End: 2019-10-04
Attending: OBSTETRICS & GYNECOLOGY | Admitting: OBSTETRICS & GYNECOLOGY
Payer: MEDICAID

## 2019-10-02 ENCOUNTER — ANESTHESIA (OUTPATIENT)
Dept: OBSTETRICS AND GYNECOLOGY | Facility: HOSPITAL | Age: 24
End: 2019-10-02
Payer: MEDICAID

## 2019-10-02 ENCOUNTER — ANESTHESIA EVENT (OUTPATIENT)
Dept: OBSTETRICS AND GYNECOLOGY | Facility: HOSPITAL | Age: 24
End: 2019-10-02
Payer: MEDICAID

## 2019-10-02 DIAGNOSIS — O36.5930 POOR FETAL GROWTH AFFECTING MANAGEMENT OF MOTHER IN THIRD TRIMESTER, SINGLE OR UNSPECIFIED FETUS: ICD-10-CM

## 2019-10-02 DIAGNOSIS — O42.90 AMNIOTIC FLUID LEAKING: ICD-10-CM

## 2019-10-02 LAB
ABO + RH BLD: NORMAL
BASOPHILS # BLD AUTO: 0.01 K/UL (ref 0–0.2)
BASOPHILS NFR BLD: 0.1 % (ref 0–1.9)
BLD GP AB SCN CELLS X3 SERPL QL: NORMAL
DIFFERENTIAL METHOD: ABNORMAL
EOSINOPHIL # BLD AUTO: 0 K/UL (ref 0–0.5)
EOSINOPHIL NFR BLD: 0.3 % (ref 0–8)
ERYTHROCYTE [DISTWIDTH] IN BLOOD BY AUTOMATED COUNT: 14.4 % (ref 11.5–14.5)
HCT VFR BLD AUTO: 35.2 % (ref 37–48.5)
HGB BLD-MCNC: 11.1 G/DL (ref 12–16)
IMM GRANULOCYTES # BLD AUTO: 0.09 K/UL (ref 0–0.04)
IMM GRANULOCYTES NFR BLD AUTO: 0.8 % (ref 0–0.5)
LYMPHOCYTES # BLD AUTO: 1.6 K/UL (ref 1–4.8)
LYMPHOCYTES NFR BLD: 13.5 % (ref 18–48)
MCH RBC QN AUTO: 26.6 PG (ref 27–31)
MCHC RBC AUTO-ENTMCNC: 31.5 G/DL (ref 32–36)
MCV RBC AUTO: 84 FL (ref 82–98)
MONOCYTES # BLD AUTO: 0.8 K/UL (ref 0.3–1)
MONOCYTES NFR BLD: 6.5 % (ref 4–15)
NEUTROPHILS # BLD AUTO: 9.3 K/UL (ref 1.8–7.7)
NEUTROPHILS NFR BLD: 78.8 % (ref 38–73)
NRBC BLD-RTO: 0 /100 WBC
PLATELET # BLD AUTO: 171 K/UL (ref 150–350)
PMV BLD AUTO: 11.7 FL (ref 9.2–12.9)
RBC # BLD AUTO: 4.17 M/UL (ref 4–5.4)
WBC # BLD AUTO: 11.84 K/UL (ref 3.9–12.7)

## 2019-10-02 PROCEDURE — 62326 NJX INTERLAMINAR LMBR/SAC: CPT | Performed by: NURSE ANESTHETIST, CERTIFIED REGISTERED

## 2019-10-02 PROCEDURE — 11000001 HC ACUTE MED/SURG PRIVATE ROOM

## 2019-10-02 PROCEDURE — 25000003 PHARM REV CODE 250: Performed by: ADVANCED PRACTICE MIDWIFE

## 2019-10-02 PROCEDURE — 59409 OBSTETRICAL CARE: CPT | Mod: GB,,, | Performed by: ADVANCED PRACTICE MIDWIFE

## 2019-10-02 PROCEDURE — 63600175 PHARM REV CODE 636 W HCPCS: Performed by: NURSE ANESTHETIST, CERTIFIED REGISTERED

## 2019-10-02 PROCEDURE — 63600175 PHARM REV CODE 636 W HCPCS: Performed by: ADVANCED PRACTICE MIDWIFE

## 2019-10-02 PROCEDURE — 51701 INSERT BLADDER CATHETER: CPT

## 2019-10-02 PROCEDURE — 85025 COMPLETE CBC W/AUTO DIFF WBC: CPT

## 2019-10-02 PROCEDURE — 27800516 HC TRAY, EPIDURAL COMBO: Performed by: NURSE ANESTHETIST, CERTIFIED REGISTERED

## 2019-10-02 PROCEDURE — 72100002 HC LABOR CARE, 1ST 8 HOURS

## 2019-10-02 PROCEDURE — 25000003 PHARM REV CODE 250: Performed by: NURSE ANESTHETIST, CERTIFIED REGISTERED

## 2019-10-02 PROCEDURE — 86850 RBC ANTIBODY SCREEN: CPT

## 2019-10-02 PROCEDURE — 72200004 HC VAGINAL DELIVERY LEVEL I

## 2019-10-02 PROCEDURE — 59409 PR OBSTETRICAL CARE,VAG DELIV ONLY: ICD-10-PCS | Mod: GB,,, | Performed by: ADVANCED PRACTICE MIDWIFE

## 2019-10-02 RX ORDER — ACETAMINOPHEN 325 MG/1
650 TABLET ORAL EVERY 6 HOURS PRN
Status: DISCONTINUED | OUTPATIENT
Start: 2019-10-02 | End: 2019-10-04 | Stop reason: HOSPADM

## 2019-10-02 RX ORDER — IBUPROFEN 600 MG/1
600 TABLET ORAL EVERY 6 HOURS
Status: DISCONTINUED | OUTPATIENT
Start: 2019-10-02 | End: 2019-10-04 | Stop reason: HOSPADM

## 2019-10-02 RX ORDER — ROPIVACAINE HYDROCHLORIDE 2 MG/ML
INJECTION, SOLUTION EPIDURAL; INFILTRATION; PERINEURAL CONTINUOUS PRN
Status: DISCONTINUED | OUTPATIENT
Start: 2019-10-02 | End: 2019-10-03

## 2019-10-02 RX ORDER — DIPHENHYDRAMINE HYDROCHLORIDE 50 MG/ML
25 INJECTION INTRAMUSCULAR; INTRAVENOUS EVERY 4 HOURS PRN
Status: DISCONTINUED | OUTPATIENT
Start: 2019-10-02 | End: 2019-10-04 | Stop reason: HOSPADM

## 2019-10-02 RX ORDER — CALCIUM CARBONATE 200(500)MG
500 TABLET,CHEWABLE ORAL 3 TIMES DAILY PRN
Status: DISCONTINUED | OUTPATIENT
Start: 2019-10-02 | End: 2019-10-02

## 2019-10-02 RX ORDER — HYDROCODONE BITARTRATE AND ACETAMINOPHEN 5; 325 MG/1; MG/1
1 TABLET ORAL EVERY 4 HOURS PRN
Status: DISCONTINUED | OUTPATIENT
Start: 2019-10-02 | End: 2019-10-04 | Stop reason: HOSPADM

## 2019-10-02 RX ORDER — TERBUTALINE SULFATE 1 MG/ML
0.25 INJECTION SUBCUTANEOUS
Status: DISCONTINUED | OUTPATIENT
Start: 2019-10-02 | End: 2019-10-02

## 2019-10-02 RX ORDER — SIMETHICONE 80 MG
1 TABLET,CHEWABLE ORAL 4 TIMES DAILY PRN
Status: DISCONTINUED | OUTPATIENT
Start: 2019-10-02 | End: 2019-10-02

## 2019-10-02 RX ORDER — OXYTOCIN/RINGER'S LACTATE 30/500 ML
334 PLASTIC BAG, INJECTION (ML) INTRAVENOUS ONCE
Status: COMPLETED | OUTPATIENT
Start: 2019-10-02 | End: 2019-10-02

## 2019-10-02 RX ORDER — DOCUSATE SODIUM 100 MG/1
200 CAPSULE, LIQUID FILLED ORAL 2 TIMES DAILY PRN
Status: DISCONTINUED | OUTPATIENT
Start: 2019-10-02 | End: 2019-10-04 | Stop reason: HOSPADM

## 2019-10-02 RX ORDER — MISOPROSTOL 100 MCG
25 TABLET ORAL ONCE
Status: DISCONTINUED | OUTPATIENT
Start: 2019-10-02 | End: 2019-10-02

## 2019-10-02 RX ORDER — ROPIVACAINE HYDROCHLORIDE 2 MG/ML
INJECTION, SOLUTION EPIDURAL; INFILTRATION; PERINEURAL
Status: DISCONTINUED | OUTPATIENT
Start: 2019-10-02 | End: 2019-10-03

## 2019-10-02 RX ORDER — SODIUM CHLORIDE, SODIUM LACTATE, POTASSIUM CHLORIDE, CALCIUM CHLORIDE 600; 310; 30; 20 MG/100ML; MG/100ML; MG/100ML; MG/100ML
INJECTION, SOLUTION INTRAVENOUS CONTINUOUS
Status: DISCONTINUED | OUTPATIENT
Start: 2019-10-02 | End: 2019-10-02

## 2019-10-02 RX ORDER — ONDANSETRON 8 MG/1
8 TABLET, ORALLY DISINTEGRATING ORAL EVERY 8 HOURS PRN
Status: DISCONTINUED | OUTPATIENT
Start: 2019-10-02 | End: 2019-10-04 | Stop reason: HOSPADM

## 2019-10-02 RX ORDER — OXYTOCIN/RINGER'S LACTATE 30/500 ML
2 PLASTIC BAG, INJECTION (ML) INTRAVENOUS CONTINUOUS
Status: DISCONTINUED | OUTPATIENT
Start: 2019-10-02 | End: 2019-10-02

## 2019-10-02 RX ORDER — BUTORPHANOL TARTRATE 2 MG/ML
2 INJECTION INTRAMUSCULAR; INTRAVENOUS
Status: DISCONTINUED | OUTPATIENT
Start: 2019-10-02 | End: 2019-10-02

## 2019-10-02 RX ORDER — OXYTOCIN/RINGER'S LACTATE 30/500 ML
95 PLASTIC BAG, INJECTION (ML) INTRAVENOUS ONCE
Status: DISCONTINUED | OUTPATIENT
Start: 2019-10-02 | End: 2019-10-02

## 2019-10-02 RX ORDER — ONDANSETRON 8 MG/1
8 TABLET, ORALLY DISINTEGRATING ORAL EVERY 8 HOURS PRN
Status: DISCONTINUED | OUTPATIENT
Start: 2019-10-02 | End: 2019-10-02

## 2019-10-02 RX ORDER — BUTORPHANOL TARTRATE 2 MG/ML
1 INJECTION INTRAMUSCULAR; INTRAVENOUS
Status: DISCONTINUED | OUTPATIENT
Start: 2019-10-02 | End: 2019-10-02

## 2019-10-02 RX ORDER — OXYTOCIN/RINGER'S LACTATE 30/500 ML
95 PLASTIC BAG, INJECTION (ML) INTRAVENOUS ONCE
Status: DISCONTINUED | OUTPATIENT
Start: 2019-10-02 | End: 2019-10-04 | Stop reason: HOSPADM

## 2019-10-02 RX ORDER — DIPHENHYDRAMINE HCL 25 MG
25 CAPSULE ORAL EVERY 4 HOURS PRN
Status: DISCONTINUED | OUTPATIENT
Start: 2019-10-02 | End: 2019-10-04 | Stop reason: HOSPADM

## 2019-10-02 RX ADMIN — ROPIVACAINE HYDROCHLORIDE 5 ML: 2 INJECTION, SOLUTION EPIDURAL; INFILTRATION at 01:10

## 2019-10-02 RX ADMIN — IBUPROFEN 600 MG: 600 TABLET ORAL at 11:10

## 2019-10-02 RX ADMIN — HYDROCODONE BITARTRATE AND ACETAMINOPHEN 1 TABLET: 5; 325 TABLET ORAL at 08:10

## 2019-10-02 RX ADMIN — ROPIVACAINE HYDROCHLORIDE 14 ML/HR: 2 INJECTION, SOLUTION EPIDURAL; INFILTRATION at 01:10

## 2019-10-02 RX ADMIN — IBUPROFEN 600 MG: 600 TABLET ORAL at 06:10

## 2019-10-02 RX ADMIN — SODIUM CHLORIDE, SODIUM LACTATE, POTASSIUM CHLORIDE, AND CALCIUM CHLORIDE: .6; .31; .03; .02 INJECTION, SOLUTION INTRAVENOUS at 01:10

## 2019-10-02 RX ADMIN — SODIUM CHLORIDE, SODIUM LACTATE, POTASSIUM CHLORIDE, AND CALCIUM CHLORIDE 1000 ML: .6; .31; .03; .02 INJECTION, SOLUTION INTRAVENOUS at 12:10

## 2019-10-02 RX ADMIN — Medication 999 MILLI-UNITS/MIN: at 04:10

## 2019-10-02 RX ADMIN — LIDOCAINE HYDROCHLORIDE AND EPINEPHRINE 5 ML: 15; 5 INJECTION, SOLUTION EPIDURAL at 01:10

## 2019-10-02 NOTE — PROGRESS NOTES

## 2019-10-02 NOTE — ANESTHESIA PROCEDURE NOTES
Epidural    Patient location during procedure: OB   Reason for block: primary anesthetic   Diagnosis: iup, labor    Start time: 10/2/2019 1:21 PM  Timeout: 10/2/2019 1:21 PM  End time: 10/2/2019 1:45 PM    Staffing  Performing Provider: Stephen Ferguson Jr., CRNA  Authorizing Provider: Rissa Huston MD        Preanesthetic Checklist  Completed: patient identified, site marked, surgical consent, pre-op evaluation, timeout performed, IV checked, risks and benefits discussed, monitors and equipment checked, anesthesia consent given, hand hygiene performed and patient being monitored  Preparation  Patient position: sitting  Prep: Betadine  Patient monitoring: Pulse Ox and Blood Pressure  Epidural  Skin Anesthetic: lidocaine 1%  Skin Wheal: 3 mL  Administration type: continuous  Approach: midline  Interspace: L3-4    Injection technique: BRIE air  Needle and Epidural Catheter  Needle type: Tuohy   Needle gauge: 17  Needle length: 3.5 inches  Needle insertion depth: 7 cm  Catheter type: springwound and multi-orifice  Catheter size: 18 G  Catheter at skin depth: 15 cm  Test dose: 3 mL of lidocaine 1.5% with Epi 1-to-200,000  Additional Documentation: incremental injection, negative aspiration for heme and CSF, no paresthesia on injection, no signs/symptoms of IV or SA injection, no significant pain on injection and no significant complaints from patient  Needle localization: anatomical landmarks  Medications:  Volume per aspiration: 0 mL  Time between aspirations: 2 minutes  Assessment  Ease of block: moderate  Patient's tolerance of the procedure: comfortable throughout block and no complaints

## 2019-10-02 NOTE — H&P
[facility] -   Obstetrics  History & Physical    Patient Name: Marcella Duke  MRN: [id number]  Admission Date: 10/2/2019  Primary Care Provider: Primary Doctor No    Subjective:     Principal Problem:Amniotic fluid leaking    History of Present Illness:  Presents with C/O contractions and SROM @ 1000    Obstetric HPI:  Patient reports  contractions, active fetal movement, No vaginal bleeding , Yes loss of fluid 1000 clear fluid    This pregnancy has been complicated by history of increased BP last pregnancy has been on ASA    OB History    Para Term  AB Living   3 1 1 0 1 1   SAB TAB Ectopic Multiple Live Births   1 0 0 0 1      # Outcome Date GA Lbr Jeb/2nd Weight Sex Delivery Anes PTL Lv   3 Current            2 Term 16 38w6d / 00:11 2.8 kg (6 lb 2.8 oz) F Vag-Spont EPI N DIMITRI      Name: JOSE,BABY GIRL       Apgar1: 9  Apgar5: 9   1 SAB              Past Medical History:   Diagnosis Date    Asthma     Hypertension affecting pregnancy in third trimester, antepartum 2016     Past Surgical History:   Procedure Laterality Date    none         PTA Medications   Medication Sig    prenatal vit-iron fum-folic ac (PRENATAL VITAMIN WITH MINERALS) 28 mg iron- 800 mcg Tab Take 1 tablet by mouth once daily.    promethazine (PHENERGAN) 25 MG tablet Take 1 tablet (25 mg total) by mouth every 4 (four) hours as needed for Nausea.    VENTOLIN HFA 90 mcg/actuation inhaler TWO PUFFS EVERY 6 HOURS AS NEEDED       Review of patient's allergies indicates:  No Known Allergies     Family History     Problem Relation (Age of Onset)    Hypertension Maternal Grandmother    Stroke Paternal Grandmother        Tobacco Use    Smoking status: Never Smoker    Smokeless tobacco: Never Used   Substance and Sexual Activity    Alcohol use: Yes     Comment: occ    Drug use: No    Sexual activity: Yes     Partners: Male        Objective:     Vital Signs (Most Recent):  Temp: 98.3 °F (36.8 °C)  (10/02/19 1346)  Pulse: (!) 114 (10/02/19 1346)  Resp: 18 (10/02/19 1346)  BP: 125/84 (10/02/19 1346)  SpO2: 99 % (10/02/19 1338) Vital Signs (24h Range):  Temp:  [98.3 °F (36.8 °C)-98.6 °F (37 °C)] 98.3 °F (36.8 °C)  Pulse:  [] 114  Resp:  [18-20] 18  SpO2:  [96 %-100 %] 99 %  BP: (124-157)/() 125/84     Weight: 80.7 kg (178 lb)  Body mass index is 29.62 kg/m².    FHT:Cat 1 (reassuring)  TOCO:  Q 2-3 minutes    Physical Exam:   Constitutional: She is oriented to person, place, and time. She appears well-developed and well-nourished.        Pulmonary/Chest: Effort normal.        Abdominal: Soft.     Genitourinary: Vagina normal and uterus normal.           Musculoskeletal: Normal range of motion and moves all extremeties.       Neurological: She is alert and oriented to person, place, and time.    Skin: Skin is warm and dry.    Psychiatric: She has a normal mood and affect. Her behavior is normal.       Cervix:  Dilation:  8  Effacement:  100%  Station: -1  Presentation: Vertex epidural in progress     Significant Labs:  Lab Results   Component Value Date    GROUPTRH A POS 10/02/2019    HEPBSAG Negative 2019    STREPBCULT No Group B Streptococcus isolated 2019       I have personallly reviewed all pertinent lab results from the last 24 hours.    Assessment/Plan:     23 y.o. female  at 39w6d for:    No notes have been filed under this hospital service.  Service: Obstetrics and Gynecology      Blanca Hood CNM  Obstetrics  [facility] -

## 2019-10-02 NOTE — SUBJECTIVE & OBJECTIVE
Obstetric HPI:  Patient reports  contractions, active fetal movement, No vaginal bleeding , Yes loss of fluid 1000 clear fluid    This pregnancy has been complicated by history of increased BP last pregnancy has been on ASA    OB History    Para Term  AB Living   3 1 1 0 1 1   SAB TAB Ectopic Multiple Live Births   1 0 0 0 1      # Outcome Date GA Lbr Jeb/2nd Weight Sex Delivery Anes PTL Lv   3 Current            2 Term 16 38w6d / 00:11 2.8 kg (6 lb 2.8 oz) F Vag-Spont EPI N DIMITRI      Name: JOSE,BABY GIRL       Apgar1: 9  Apgar5: 9   1 SAB              Past Medical History:   Diagnosis Date    Asthma     Hypertension affecting pregnancy in third trimester, antepartum 2016     Past Surgical History:   Procedure Laterality Date    none         PTA Medications   Medication Sig    prenatal vit-iron fum-folic ac (PRENATAL VITAMIN WITH MINERALS) 28 mg iron- 800 mcg Tab Take 1 tablet by mouth once daily.    promethazine (PHENERGAN) 25 MG tablet Take 1 tablet (25 mg total) by mouth every 4 (four) hours as needed for Nausea.    VENTOLIN HFA 90 mcg/actuation inhaler TWO PUFFS EVERY 6 HOURS AS NEEDED       Review of patient's allergies indicates:  No Known Allergies     Family History     Problem Relation (Age of Onset)    Hypertension Maternal Grandmother    Stroke Paternal Grandmother        Tobacco Use    Smoking status: Never Smoker    Smokeless tobacco: Never Used   Substance and Sexual Activity    Alcohol use: Yes     Comment: occ    Drug use: No    Sexual activity: Yes     Partners: Male        Objective:     Vital Signs (Most Recent):  Temp: 98.3 °F (36.8 °C) (10/02/19 1346)  Pulse: (!) 114 (10/02/19 1346)  Resp: 18 (10/02/19 1346)  BP: 125/84 (10/02/19 1346)  SpO2: 99 % (10/02/19 1338) Vital Signs (24h Range):  Temp:  [98.3 °F (36.8 °C)-98.6 °F (37 °C)] 98.3 °F (36.8 °C)  Pulse:  [] 114  Resp:  [18-20] 18  SpO2:  [96 %-100 %] 99 %  BP: (124-157)/() 125/84     Weight:  80.7 kg (178 lb)  Body mass index is 29.62 kg/m².    FHT:Cat 1 (reassuring)  TOCO:  Q 2-3 minutes    Physical Exam:   Constitutional: She is oriented to person, place, and time. She appears well-developed and well-nourished.        Pulmonary/Chest: Effort normal.        Abdominal: Soft.     Genitourinary: Vagina normal and uterus normal.           Musculoskeletal: Normal range of motion and moves all extremeties.       Neurological: She is alert and oriented to person, place, and time.    Skin: Skin is warm and dry.    Psychiatric: She has a normal mood and affect. Her behavior is normal.       Cervix:  Dilation:  8  Effacement:  100%  Station: -1  Presentation: Vertex epidural in progress     Significant Labs:  Lab Results   Component Value Date    GROUPTRH A POS 10/02/2019    HEPBSAG Negative 04/18/2019    STREPBCULT No Group B Streptococcus isolated 09/05/2019       I have personallly reviewed all pertinent lab results from the last 24 hours.

## 2019-10-02 NOTE — L&D DELIVERY NOTE
[facility]  Vaginal Delivery   Operative Note    SUMMARY      Normal spontaneous vaginal delivery of live infant, was placed on mothers abdomen for skin to skin and bulb suctioning performed.  Infant delivered position GEOFF over intact perineum.  Nuchal cord: No.    Spontaneous delivery of placenta and IV pitocin given noting good uterine tone.  No lacerations noted.  Patient tolerated delivery well. Sponge needle and lap counted correctly x2.    Indications: Amniotic fluid leaking  Pregnancy complicated by:   Patient Active Problem List   Diagnosis    Asthma    E coli bacteremia    History of high blood pressure    Poor fetal growth affecting management of mother in third trimester    Amniotic fluid leaking     Admitting GA: 39w6d    Delivery Information for Miri Duke    Birth information:  YOB: 2020   Time of birth: 4:26 PM   Sex: female   Head Delivery Date/Time: 10/2/2019  4:25 PM   Delivery type:    Gestational Age: 39w6d    Delivery Providers    Delivering clinician:             Measurements    Weight:    Length:           Apgars    Living status:    Apgars:   1 min.:   5 min.:   10 min.:   15 min.:   20 min.:     Skin color:          Heart rate:          Reflex irritability:          Muscle tone:          Respiratory effort:          Total:                 Operative Delivery    Forceps attempted?:  No  Vacuum extractor attempted?:  No         Shoulder Dystocia    Shoulder dystocia present?:  No           Presentation    Presentation:  Vertex  Position:  Occiput Anterior           Interventions/Resuscitation    Method:  Bulb Suctioning, Tactile Stimulation       Cord    Complications:  None  Delayed Cord Clamping?:  Yes  Cord Clamped Date/Time:  10/2/2019  4:28 PM  Cord Blood Disposition:  Lab  Gases Sent?:  No  Stem Cell Collection (by MD):  No       Placenta    Placenta delivery date/time:  10/2/2019 1632  Placenta removal:  Spontaneous  Placenta appearance:   Intact  Placenta disposition:  discarded           Labor Events:       labor: No     Labor Onset Date/Time:         Dilation Complete Date/Time:         Start Pushing Date/Time:       Rupture Date/Time:              Rupture type:           Fluid Amount:        Fluid Color:        Fluid Odor:        Membrane Status (PeriCalm): SRM (Spontaneous Rupture)      Rupture Date/Time (PeriCalm): 10/02/2019 10:00:00      Fluid Amount (PeriCalm): Moderate      Fluid Color (PeriCalm): Clear       steroids: None     Antibiotics given for GBS: No     Induction:       Indications for induction:        Augmentation:       Indications for augmentation:       Labor complications: None     Additional complications:          Cervical ripening:                     Delivery:      Episiotomy: None     Indication for Episiotomy:       Perineal Lacerations:   Repaired:      Periurethral Laceration:   Repaired:     Labial Laceration:   Repaired:     Sulcus Laceration:   Repaired:     Vaginal Laceration:   Repaired:     Cervical Laceration:   Repaired:     Repair suture:       Repair # of packets:       Last Value - EBL - Nursing (mL):       Sum - EBL - Nursing (mL): 0     Last Value - EBL - Anesthesia (mL):      Calculated QBL (mL):        Vaginal Sweep Performed:       Surgicount Correct:         Other providers:       Anesthesia    Method:  Epidural          Details (if applicable):  Trial of Labor      Categorization:      Priority:     Indications for :     Incision Type:       Additional  information:  Forceps:    Vacuum:    Breech:    Observed anomalies    Other (Comments):

## 2019-10-02 NOTE — ANESTHESIA PREPROCEDURE EVALUATION
10/02/2019  Marcella Duke is a 23 y.o., female.    Anesthesia Evaluation    I have reviewed the Patient Summary Reports.    I have reviewed the Nursing Notes.   I have reviewed the Medications.     Review of Systems  Anesthesia Hx:  No previous Anesthesia    Social:  Non-Smoker, No Alcohol Use    Hematology/Oncology:  Hematology Normal   Oncology Normal     EENT/Dental:EENT/Dental Normal   Cardiovascular:   Exercise tolerance: good Hypertension NYHA Classification I    Pulmonary:   Asthma    Renal/:  Renal/ Normal     Hepatic/GI:  Hepatic/GI Normal    Musculoskeletal:  Musculoskeletal Normal    Neurological:  Neurology Normal    Endocrine:  Endocrine Normal    Dermatological:  Skin Normal    Psych:  Psychiatric Normal           Physical Exam  General:  Well nourished    Airway/Jaw/Neck:  Airway Findings: Mouth Opening: Normal Tongue: Normal  General Airway Assessment: Adult  Mallampati: II  Improves to I with phonation.  TM Distance: Normal, at least 6 cm        Eyes/Ears/Nose:  EYES/EARS/NOSE FINDINGS: Normal   Dental:  DENTAL FINDINGS: Normal   Chest/Lungs:  Chest/Lungs Findings: Clear to auscultation, Normal Respiratory Rate     Heart/Vascular:  Heart Findings: Rate: Normal  Rhythm: Regular Rhythm     Abdomen:  Abdomen Findings: Normal    Musculoskeletal:  Musculoskeletal Findings: Normal   Skin:  Skin Findings: Normal    Mental Status:  Mental Status Findings:  Cooperative, Alert and Oriented         Anesthesia Plan  Type of Anesthesia, risks & benefits discussed:  Anesthesia Type:  epidural  Patient's Preference:   Intra-op Monitoring Plan: standard ASA monitors  Intra-op Monitoring Plan Comments:   Post Op Pain Control Plan:   Post Op Pain Control Plan Comments:   Induction:   IV  Beta Blocker:  Patient is not currently on a Beta-Blocker (No further documentation required).       Informed  Consent: Patient understands risks and agrees with Anesthesia plan.  Questions answered. Anesthesia consent signed with patient.  ASA Score: 2     Day of Surgery Review of History & Physical:    H&P update referred to the surgeon.         Ready For Surgery From Anesthesia Perspective.

## 2019-10-02 NOTE — HOSPITAL COURSE
Admit for labor management  10/2/19  @ 1626, female, bottle, no lacerations  10/3/19-PPD 1, routine PP care  Transferred to mother baby unit for routine post partum care. Patient progressed well post delivery without complication.  Stable table for discharge PP day 2

## 2019-10-03 PROCEDURE — 11000001 HC ACUTE MED/SURG PRIVATE ROOM

## 2019-10-03 PROCEDURE — 25000003 PHARM REV CODE 250: Performed by: ADVANCED PRACTICE MIDWIFE

## 2019-10-03 PROCEDURE — 99231 SBSQ HOSP IP/OBS SF/LOW 25: CPT | Mod: ,,, | Performed by: ADVANCED PRACTICE MIDWIFE

## 2019-10-03 PROCEDURE — 99231 PR SUBSEQUENT HOSPITAL CARE,LEVL I: ICD-10-PCS | Mod: ,,, | Performed by: ADVANCED PRACTICE MIDWIFE

## 2019-10-03 RX ADMIN — HYDROCODONE BITARTRATE AND ACETAMINOPHEN 1 TABLET: 5; 325 TABLET ORAL at 08:10

## 2019-10-03 RX ADMIN — IBUPROFEN 600 MG: 600 TABLET ORAL at 06:10

## 2019-10-03 RX ADMIN — IBUPROFEN 600 MG: 600 TABLET ORAL at 12:10

## 2019-10-03 RX ADMIN — HYDROCODONE BITARTRATE AND ACETAMINOPHEN 1 TABLET: 5; 325 TABLET ORAL at 01:10

## 2019-10-03 RX ADMIN — HYDROCODONE BITARTRATE AND ACETAMINOPHEN 1 TABLET: 5; 325 TABLET ORAL at 09:10

## 2019-10-03 RX ADMIN — IBUPROFEN 600 MG: 600 TABLET ORAL at 05:10

## 2019-10-03 NOTE — PLAN OF CARE
Patient progressing well. Bonding appropriately with . Pain controlled with oral medications. Ambulates independently and voids without difficulty. Vital signs stable. No issues noted. Will continue to monitor.

## 2019-10-03 NOTE — SUBJECTIVE & OBJECTIVE
Hospital course: Admit for labor management  10/2/19  @ 1626, female, bottle, no lacerations  10/3/19-PPD 1, routine PP care    Interval History:     She is doing well this morning. She is tolerating a regular diet without nausea or vomiting. She is voiding spontaneously. She is ambulating. She has passed flatus, and has not a BM. Vaginal bleeding is mild. She denies fever or chills. Abdominal pain is mild and controlled with oral medications. She is breastfeeding. She desires circumcision for her male baby: not applicable.    Objective:     Vital Signs (Most Recent):  Temp: 97.8 °F (36.6 °C) (10/03/19 0730)  Pulse: 65 (10/03/19 0730)  Resp: 18 (10/03/19 0730)  BP: (!) 129/94 (10/03/19 0730)  SpO2: 100 % (10/02/19 1648) Vital Signs (24h Range):  Temp:  [97.8 °F (36.6 °C)-98.6 °F (37 °C)] 97.8 °F (36.6 °C)  Pulse:  [] 65  Resp:  [18-20] 18  SpO2:  [96 %-100 %] 100 %  BP: (103-157)/() 129/94     Weight: 80.7 kg (178 lb)  Body mass index is 29.62 kg/m².      Intake/Output Summary (Last 24 hours) at 10/3/2019 0916  Last data filed at 10/2/2019 2345  Gross per 24 hour   Intake 1947.92 ml   Output 2650 ml   Net -702.08 ml       Significant Labs:  Lab Results   Component Value Date    GROUPTRH A POS 10/02/2019    HEPBSAG Negative 2019    STREPBCULT No Group B Streptococcus isolated 2019     Recent Labs   Lab 10/02/19  1155   HGB 11.1*   HCT 35.2*       I have personallly reviewed all pertinent lab results from the last 24 hours.    Physical Exam:   Constitutional: She is oriented to person, place, and time. She appears well-developed and well-nourished.    HENT:   Head: Normocephalic.     Neck: Normal range of motion.    Cardiovascular: Normal rate and regular rhythm.     Pulmonary/Chest: Effort normal.        Abdominal: Soft.     Genitourinary: Vagina normal and uterus normal.           Musculoskeletal: Normal range of motion and moves all extremeties.       Neurological: She is alert and  oriented to person, place, and time.    Skin: Skin is warm and dry.

## 2019-10-03 NOTE — ANESTHESIA POSTPROCEDURE EVALUATION
Anesthesia Post Evaluation    Patient: Marcella Duke    Procedure(s) Performed: * No procedures listed *    Final Anesthesia Type: epidural  Patient location during evaluation: labor & delivery  Patient participation: Yes- Able to Participate  Level of consciousness: awake and alert  Post-procedure vital signs: reviewed and stable  Pain management: adequate  Airway patency: patent  PONV status at discharge: No PONV  Anesthetic complications: no      Cardiovascular status: blood pressure returned to baseline  Respiratory status: unassisted and spontaneous ventilation  Hydration status: euvolemic  Follow-up not needed.          Vitals Value Taken Time   /80 10/3/2019 12:00 AM   Temp 37 °C (98.6 °F) 10/3/2019 12:00 AM   Pulse 60 10/3/2019 12:00 AM   Resp 18 10/3/2019 12:00 AM   SpO2 100 % 10/2/2019  5:02 PM   Vitals shown include unvalidated device data.      No case tracking events are documented in the log.      Pain/Preeti Score: Pain Rating Prior to Med Admin: 0 (10/3/2019  6:33 AM)  Pain Rating Post Med Admin: 0 (10/3/2019  2:51 AM)  Preeti Score: 10 (10/2/2019  4:45 PM)

## 2019-10-03 NOTE — PROGRESS NOTES
[facility] -   Obstetrics  Postpartum Progress Note    Patient Name: Marcella Duke  MRN: [id number]  Admission Date: 10/2/2019  Hospital Length of Stay: 1 days  Attending Physician: Gee Morgan MD  Primary Care Provider: Primary Doctor No    Subjective:     Principal Problem:Vaginal delivery    Hospital course: Admit for labor management  10/2/19  @ 1626, female, bottle, no lacerations  10/3/19-PPD 1, routine PP care    Interval History:     She is doing well this morning. She is tolerating a regular diet without nausea or vomiting. She is voiding spontaneously. She is ambulating. She has passed flatus, and has not a BM. Vaginal bleeding is mild. She denies fever or chills. Abdominal pain is mild and controlled with oral medications. She is breastfeeding. She desires circumcision for her male baby: not applicable.    Objective:     Vital Signs (Most Recent):  Temp: 97.8 °F (36.6 °C) (10/03/19 0730)  Pulse: 65 (10/03/19 0730)  Resp: 18 (10/03/19 0730)  BP: (!) 129/94 (10/03/19 0730)  SpO2: 100 % (10/02/19 1648) Vital Signs (24h Range):  Temp:  [97.8 °F (36.6 °C)-98.6 °F (37 °C)] 97.8 °F (36.6 °C)  Pulse:  [] 65  Resp:  [18-20] 18  SpO2:  [96 %-100 %] 100 %  BP: (103-157)/() 129/94     Weight: 80.7 kg (178 lb)  Body mass index is 29.62 kg/m².      Intake/Output Summary (Last 24 hours) at 10/3/2019 0916  Last data filed at 10/2/2019 2345  Gross per 24 hour   Intake 1947.92 ml   Output 2650 ml   Net -702.08 ml       Significant Labs:  Lab Results   Component Value Date    GROUPTRH A POS 10/02/2019    HEPBSAG Negative 2019    STREPBCULT No Group B Streptococcus isolated 2019     Recent Labs   Lab 10/02/19  1155   HGB 11.1*   HCT 35.2*       I have personallly reviewed all pertinent lab results from the last 24 hours.    Physical Exam:   Constitutional: She is oriented to person, place, and time. She appears well-developed and well-nourished.    HENT:   Head:  Normocephalic.     Neck: Normal range of motion.    Cardiovascular: Normal rate and regular rhythm.     Pulmonary/Chest: Effort normal.        Abdominal: Soft.     Genitourinary: Vagina normal and uterus normal.           Musculoskeletal: Normal range of motion and moves all extremeties.       Neurological: She is alert and oriented to person, place, and time.    Skin: Skin is warm and dry.        Assessment/Plan:     23 y.o. female  for:    * Vaginal delivery  Routine PP care        Disposition: As patient meets milestones, will plan to discharge tomorrow.    Umu Pereira CNM  Obstetrics  [facility]

## 2019-10-03 NOTE — PLAN OF CARE
Patient progressing well post vag delivery. Pain/cramping well controlled on po meds. VSS. Ambulating in hallway. Bonding with baby taking place. Fundus is firm and bleeding is light. Family/friend at bedside.

## 2019-10-04 ENCOUNTER — PATIENT MESSAGE (OUTPATIENT)
Dept: OBSTETRICS AND GYNECOLOGY | Facility: CLINIC | Age: 24
End: 2019-10-04

## 2019-10-04 VITALS
TEMPERATURE: 99 F | BODY MASS INDEX: 29.66 KG/M2 | HEIGHT: 65 IN | DIASTOLIC BLOOD PRESSURE: 69 MMHG | OXYGEN SATURATION: 100 % | SYSTOLIC BLOOD PRESSURE: 114 MMHG | HEART RATE: 76 BPM | WEIGHT: 178 LBS | RESPIRATION RATE: 18 BRPM

## 2019-10-04 PROCEDURE — 99238 HOSP IP/OBS DSCHRG MGMT 30/<: CPT | Mod: ,,, | Performed by: PHYSICIAN ASSISTANT

## 2019-10-04 PROCEDURE — 25000003 PHARM REV CODE 250: Performed by: ADVANCED PRACTICE MIDWIFE

## 2019-10-04 PROCEDURE — 99238 PR HOSPITAL DISCHARGE DAY,<30 MIN: ICD-10-PCS | Mod: ,,, | Performed by: PHYSICIAN ASSISTANT

## 2019-10-04 RX ORDER — DOCUSATE SODIUM 100 MG/1
200 CAPSULE, LIQUID FILLED ORAL 2 TIMES DAILY PRN
Qty: 30 CAPSULE | Refills: 0 | Status: SHIPPED | OUTPATIENT
Start: 2019-10-04

## 2019-10-04 RX ORDER — IBUPROFEN 600 MG/1
600 TABLET ORAL EVERY 6 HOURS
Qty: 30 TABLET | Refills: 0 | Status: SHIPPED | OUTPATIENT
Start: 2019-10-04 | End: 2019-10-04

## 2019-10-04 RX ORDER — IBUPROFEN 600 MG/1
600 TABLET ORAL EVERY 6 HOURS
Qty: 30 TABLET | Refills: 0 | Status: SHIPPED | OUTPATIENT
Start: 2019-10-04 | End: 2019-10-05

## 2019-10-04 RX ORDER — DOCUSATE SODIUM 100 MG/1
200 CAPSULE, LIQUID FILLED ORAL 2 TIMES DAILY PRN
Qty: 30 CAPSULE | Refills: 0 | Status: SHIPPED | OUTPATIENT
Start: 2019-10-04 | End: 2019-10-04

## 2019-10-04 RX ADMIN — HYDROCODONE BITARTRATE AND ACETAMINOPHEN 1 TABLET: 5; 325 TABLET ORAL at 10:10

## 2019-10-04 RX ADMIN — IBUPROFEN 600 MG: 600 TABLET ORAL at 05:10

## 2019-10-04 RX ADMIN — IBUPROFEN 600 MG: 600 TABLET ORAL at 12:10

## 2019-10-04 NOTE — DISCHARGE INSTRUCTIONS

## 2019-10-04 NOTE — ASSESSMENT & PLAN NOTE
PPD #2 s/p   Pt doing well, afebrile overnight.  Proceed with routine post-partum care, encouraged ambulation.  Pt counseled on management plan and discharge goals. Anticipate discharge today

## 2019-10-04 NOTE — SUBJECTIVE & OBJECTIVE
Hospital course: Admit for labor management  10/2/19  @ 1626, female, bottle, no lacerations  10/3/19-PPD 1, routine PP care  Transferred to mother baby unit for routine post partum care. Patient progressed well post delivery without complication.  Stable table for discharge PP day 2      Interval History:     She is doing well this morning. She is tolerating a regular diet without nausea or vomiting. She is voiding spontaneously. She is ambulating. She has passed flatus, and has not a BM. Vaginal bleeding is mild. She denies fever or chills. Abdominal pain is mild and controlled with oral medications. She is not breastfeeding.     Objective:     Vital Signs (Most Recent):  Temp: 98.5 °F (36.9 °C) (10/04/19 0800)  Pulse: 76 (10/04/19 0800)  Resp: 18 (10/04/19 0800)  BP: 114/69 (10/04/19 0800)  SpO2: 100 % (10/02/19 1648) Vital Signs (24h Range):  Temp:  [97.5 °F (36.4 °C)-98.5 °F (36.9 °C)] 98.5 °F (36.9 °C)  Pulse:  [65-76] 76  Resp:  [18] 18  BP: (112-127)/(57-85) 114/69     Weight: 80.7 kg (178 lb)  Body mass index is 29.62 kg/m².    No intake or output data in the 24 hours ending 10/04/19 1317    Significant Labs:  Lab Results   Component Value Date    GROUPTRH A POS 10/02/2019    HEPBSAG Negative 2019    STREPBCULT No Group B Streptococcus isolated 2019     No results for input(s): HGB, HCT in the last 48 hours.    I have personallly reviewed all pertinent lab results from the last 24 hours.    Physical Exam:   Constitutional: She is oriented to person, place, and time. She appears well-developed and well-nourished. No distress.       Cardiovascular: Normal rate, regular rhythm, normal heart sounds and intact distal pulses.    No murmur heard.   Pulmonary/Chest: Effort normal and breath sounds normal. No respiratory distress. She has no wheezes. She has no rales.        Abdominal: Soft. Bowel sounds are normal. She exhibits no distension. There is no tenderness. There is no guarding.   Uterine  fundus firm, below umbilicus, mild tenderness (appropriate)             Musculoskeletal: Moves all extremeties. She exhibits no edema.   No calf tenderness       Neurological: She is alert and oriented to person, place, and time.    Skin: Skin is warm and dry. No rash noted. She is not diaphoretic.

## 2019-10-04 NOTE — DISCHARGE SUMMARY
[facility] -   Obstetrics  Discharge Summary      Patient Name: Marcella Duke  MRN: [id number]  Admission Date: 10/2/2019  Hospital Length of Stay: 2 days  Discharge Date and Time:  10/04/2019 1:21 PM  Attending Physician: Gee Morgan MD   Discharging Provider: Suzette Wilson PA-C   Primary Care Provider: Primary Doctor No    HPI: Presents with C/O contractions and SROM @ 1000        * No surgery found *     Hospital Course:   Admit for labor management  10/2/19  @ 1626, female, bottle, no lacerations  10/3/19-PPD 1, routine PP care  Transferred to mother baby unit for routine post partum care. Patient progressed well post delivery without complication.  Stable table for discharge PP day 2           Final Active Diagnoses:    Diagnosis Date Noted POA    PRINCIPAL PROBLEM:  Vaginal delivery [O80] 10/02/2019 Not Applicable    Normal  (single liveborn) [Z38.2] 10/02/2019 No    Poor fetal growth affecting management of mother in third trimester [O36.5930] 2019 Yes      Problems Resolved During this Admission:    Diagnosis Date Noted Date Resolved POA    Amniotic fluid leaking [O42.90] 10/02/2019 10/02/2019 Yes        Labs: All labs within the past 24 hours have been reviewed    Feeding Method: bottle    Immunizations     Date Immunization Status Dose Route/Site Given by    10/04/19 1307 MMR Deferred 0.5 mL Subcutaneous/Left deltoid Fadumo Eckert RN    10/04/19 1308 Tdap Deferred 0.5 mL Intramuscular/Left deltoid Fadumo Eckert RN          Delivery:    Episiotomy: None   Lacerations: None   Repair suture: None   Repair # of packets:     Blood loss (ml):       Birth information:  YOB: 2020   Time of birth: 4:26 PM   Sex: female   Delivery type: Vaginal, Spontaneous   Gestational Age: 39w6d    Delivery Clinician:      Other providers:       Additional  information:  Forceps:    Vacuum:    Breech:    Observed anomalies      Living?:           APGARS  One minute  Five minutes Ten minutes   Skin color:         Heart rate:         Grimace:         Muscle tone:         Breathing:         Totals: 8  9        Placenta: Delivered:       appearance    Pending Diagnostic Studies:     None          Discharged Condition: good    Disposition: Home or Self Care    Follow Up:  Follow-up Information     [facility] In 4 weeks.    Specialty:  Obstetrics and Gynecology  Why:  Post partum  Contact information:  [address] [facility] [address] [phone]  Additional information:  (off [facility]) 3rd floor               Patient Instructions:      Diet Adult Regular     Other restrictions (specify):   Order Comments: Pelvic rest x 6 weeks (no tampons, intercourse douching);     Call MD for:  temperature >100.4     Call MD for:  severe uncontrolled pain     Call MD for:  difficulty breathing or increased cough     Call MD for:  severe persistent headache     Call MD for:  persistent dizziness, light-headedness, or visual disturbances     Medications:  Current Discharge Medication List      START taking these medications    Details   docusate sodium (COLACE) 100 MG capsule Take 2 capsules (200 mg total) by mouth 2 (two) times daily as needed for Constipation.  Qty: 30 capsule, Refills: 0      ibuprofen (ADVIL,MOTRIN) 600 MG tablet Take 1 tablet (600 mg total) by mouth every 6 (six) hours.  Qty: 30 tablet, Refills: 0         CONTINUE these medications which have NOT CHANGED    Details   VENTOLIN HFA 90 mcg/actuation inhaler TWO PUFFS EVERY 6 HOURS AS NEEDED  Qty: 18 g, Refills: 0         STOP taking these medications       prenatal vit-iron fum-folic ac (PRENATAL VITAMIN WITH MINERALS) 28 mg iron- 800 mcg Tab Comments:   Reason for Stopping:         promethazine (PHENERGAN) 25 MG tablet Comments:   Reason for Stopping:               Suzette Wilson PA-C  Obstetrics  [facility] -

## 2019-10-04 NOTE — PROGRESS NOTES
[facility] -   Obstetrics  Postpartum Progress Note    Patient Name: Marcella Duke  MRN: [id number]  Admission Date: 10/2/2019  Hospital Length of Stay: 2 days  Attending Physician: Gee Morgan MD  Primary Care Provider: Primary Doctor No    Subjective:     Principal Problem:Vaginal delivery    Hospital course: Admit for labor management  10/2/19  @ 1626, female, bottle, no lacerations  10/3/19-PPD 1, routine PP care  Transferred to mother baby unit for routine post partum care. Patient progressed well post delivery without complication.  Stable table for discharge PP day 2      Interval History:     She is doing well this morning. She is tolerating a regular diet without nausea or vomiting. She is voiding spontaneously. She is ambulating. She has passed flatus, and has not a BM. Vaginal bleeding is mild. She denies fever or chills. Abdominal pain is mild and controlled with oral medications. She is not breastfeeding.     Objective:     Vital Signs (Most Recent):  Temp: 98.5 °F (36.9 °C) (10/04/19 0800)  Pulse: 76 (10/04/19 0800)  Resp: 18 (10/04/19 0800)  BP: 114/69 (10/04/19 0800)  SpO2: 100 % (10/02/19 1648) Vital Signs (24h Range):  Temp:  [97.5 °F (36.4 °C)-98.5 °F (36.9 °C)] 98.5 °F (36.9 °C)  Pulse:  [65-76] 76  Resp:  [18] 18  BP: (112-127)/(57-85) 114/69     Weight: 80.7 kg (178 lb)  Body mass index is 29.62 kg/m².    No intake or output data in the 24 hours ending 10/04/19 1317    Significant Labs:  Lab Results   Component Value Date    GROUPTRH A POS 10/02/2019    HEPBSAG Negative 2019    STREPBCULT No Group B Streptococcus isolated 2019     No results for input(s): HGB, HCT in the last 48 hours.    I have personallly reviewed all pertinent lab results from the last 24 hours.    Physical Exam:   Constitutional: She is oriented to person, place, and time. She appears well-developed and well-nourished. No distress.       Cardiovascular: Normal rate, regular rhythm,  normal heart sounds and intact distal pulses.    No murmur heard.   Pulmonary/Chest: Effort normal and breath sounds normal. No respiratory distress. She has no wheezes. She has no rales.        Abdominal: Soft. Bowel sounds are normal. She exhibits no distension. There is no tenderness. There is no guarding.   Uterine fundus firm, below umbilicus, mild tenderness (appropriate)             Musculoskeletal: Moves all extremeties. She exhibits no edema.   No calf tenderness       Neurological: She is alert and oriented to person, place, and time.    Skin: Skin is warm and dry. No rash noted. She is not diaphoretic.        Assessment/Plan:     23 y.o. female  for:    * Vaginal delivery  PPD #2 s/p   Pt doing well, afebrile overnight.  Proceed with routine post-partum care, encouraged ambulation.  Pt counseled on management plan and discharge goals. Anticipate discharge today                Disposition: As patient meets milestones, will plan to discharge today.    Suzette Wilson PA-C  Obstetrics  [facility]

## 2019-10-05 RX ORDER — IBUPROFEN 800 MG/1
800 TABLET ORAL EVERY 8 HOURS PRN
Qty: 60 TABLET | Refills: 2 | Status: SHIPPED | OUTPATIENT
Start: 2019-10-05 | End: 2020-10-04

## 2019-11-05 ENCOUNTER — TELEPHONE (OUTPATIENT)
Dept: OBSTETRICS AND GYNECOLOGY | Facility: CLINIC | Age: 24
End: 2019-11-05

## 2019-11-05 NOTE — TELEPHONE ENCOUNTER
Spoke to pt in regards to rescheduling her appt. I got her rescheduled for 11/13/19 at 9:45am with Lorenzo. Pt verbalized understanding.

## 2019-11-05 NOTE — TELEPHONE ENCOUNTER
----- Message from Chikis Luna sent at 11/5/2019 10:08 AM CST -----  Contact: pt  The pt request a call to reschedule today's appt, no additional info given and can be reached at [phone]///[email or web address]

## 2019-11-20 ENCOUNTER — PATIENT MESSAGE (OUTPATIENT)
Dept: OBSTETRICS AND GYNECOLOGY | Facility: CLINIC | Age: 24
End: 2019-11-20

## 2019-11-20 ENCOUNTER — POSTPARTUM VISIT (OUTPATIENT)
Dept: OBSTETRICS AND GYNECOLOGY | Facility: CLINIC | Age: 24
End: 2019-11-20
Payer: MEDICAID

## 2019-11-20 VITALS
SYSTOLIC BLOOD PRESSURE: 118 MMHG | WEIGHT: 165.56 LBS | DIASTOLIC BLOOD PRESSURE: 72 MMHG | BODY MASS INDEX: 27.55 KG/M2

## 2019-11-20 DIAGNOSIS — Z12.4 PAP SMEAR FOR CERVICAL CANCER SCREENING: ICD-10-CM

## 2019-11-20 DIAGNOSIS — Z30.016 ENCOUNTER FOR INITIAL PRESCRIPTION OF TRANSDERMAL PATCH HORMONAL CONTRACEPTIVE DEVICE: ICD-10-CM

## 2019-11-20 PROBLEM — O36.5930 POOR FETAL GROWTH AFFECTING MANAGEMENT OF MOTHER IN THIRD TRIMESTER: Status: RESOLVED | Noted: 2019-09-13 | Resolved: 2019-11-20

## 2019-11-20 PROBLEM — B96.20 E COLI BACTEREMIA: Status: RESOLVED | Noted: 2019-04-22 | Resolved: 2019-11-20

## 2019-11-20 PROBLEM — R78.81 E COLI BACTEREMIA: Status: RESOLVED | Noted: 2019-04-22 | Resolved: 2019-11-20

## 2019-11-20 PROCEDURE — 99999 PR PBB SHADOW E&M-EST. PATIENT-LVL II: CPT | Mod: PBBFAC,,, | Performed by: MIDWIFE

## 2019-11-20 PROCEDURE — 99999 PR PBB SHADOW E&M-EST. PATIENT-LVL II: ICD-10-PCS | Mod: PBBFAC,,, | Performed by: MIDWIFE

## 2019-11-20 PROCEDURE — 59430 PR CARE AFTER DELIVERY ONLY: ICD-10-PCS | Mod: ,,, | Performed by: MIDWIFE

## 2019-11-20 PROCEDURE — 99212 OFFICE O/P EST SF 10 MIN: CPT | Mod: PBBFAC | Performed by: MIDWIFE

## 2019-11-20 PROCEDURE — 88175 CYTOPATH C/V AUTO FLUID REDO: CPT

## 2019-11-20 RX ORDER — NORELGESTROMIN AND ETHINYL ESTRADIOL 35; 150 UG/MG; UG/MG
1 PATCH TRANSDERMAL
Qty: 4 PATCH | Refills: 11 | Status: SHIPPED | OUTPATIENT
Start: 2019-11-20 | End: 2020-11-19

## 2019-11-20 NOTE — PROGRESS NOTES
Subjective:       Marcella Duke is a 24 y.o. female who presents for a postpartum visit. She is 7 weeks postpartum following a spontaneous vaginal delivery. I have fully reviewed the prenatal and intrapartum course. The delivery was at 39 6/7 gestational weeks. Outcome: spontaneous vaginal delivery. Anesthesia: epidural. Postpartum course has been unremarkable. Baby's course has been unremarkable. Baby is feeding by bottle - Similac Advance. Bleeding no bleeding. Bowel function is normal. Bladder function is normal. Patient is sexually active. Contraception method is none. Postpartum depression screening: positive. Feels overwhelmed and down at times. Denies HI/SI.    The following portions of the patient's history were reviewed and updated as appropriate: allergies, current medications, past family history, past medical history, past social history, past surgical history and problem list.    Review of Systems  Pertinent items are noted in HPI.     Objective:      /72   Wt 75.1 kg (165 lb 9.1 oz)   LMP 12/27/2018 (Approximate)   BMI 27.55 kg/m²    General:  alert, appears stated age, cooperative and no distress    Vulva:  normal   Vagina: normal vagina, no discharge, exudate, lesion, or erythema   Cervix:  no cervical motion tenderness   Corpus: normal size, contour, position, consistency, mobility, non-tender   Adnexa:  normal adnexa and no mass, fullness, tenderness   Rectal Exam: Not performed.          Assessment:       Routine postpartum exam. Pap smear done at today's visit.    Post partum depression    Plan:      1. Contraception: Ortho-Evra patches weekly  2. Therapy for depression, declines meds at this time  3. Follow up in: 1 Year or as needed.

## 2019-11-21 RX ORDER — SERTRALINE HYDROCHLORIDE 50 MG/1
50 TABLET, FILM COATED ORAL DAILY
Qty: 30 TABLET | Refills: 2 | Status: SHIPPED | OUTPATIENT
Start: 2019-11-21 | End: 2020-11-20

## 2019-12-02 LAB
FINAL PATHOLOGIC DIAGNOSIS: NORMAL
Lab: NORMAL

## 2021-01-27 ENCOUNTER — HOSPITAL ENCOUNTER (EMERGENCY)
Facility: HOSPITAL | Age: 26
Discharge: HOME | End: 2021-01-27
Attending: EMERGENCY MEDICINE

## 2021-01-27 VITALS
WEIGHT: 152.25 LBS | BODY MASS INDEX: 25.37 KG/M2 | DIASTOLIC BLOOD PRESSURE: 86 MMHG | TEMPERATURE: 98 F | HEART RATE: 87 BPM | OXYGEN SATURATION: 100 % | RESPIRATION RATE: 16 BRPM | SYSTOLIC BLOOD PRESSURE: 128 MMHG | HEIGHT: 65 IN

## 2021-01-27 DIAGNOSIS — S80.02XA CONTUSION OF LEFT KNEE, INITIAL ENCOUNTER: Primary | ICD-10-CM

## 2021-01-27 DIAGNOSIS — M25.569 KNEE PAIN: ICD-10-CM

## 2021-01-27 DIAGNOSIS — S80.212A ABRASION OF LEFT KNEE, INITIAL ENCOUNTER: ICD-10-CM

## 2021-01-27 PROCEDURE — 90715 TDAP VACCINE 7 YRS/> IM: CPT | Performed by: PHYSICIAN ASSISTANT

## 2021-01-27 PROCEDURE — 63600175 PHARM REV CODE 636 W HCPCS: Performed by: PHYSICIAN ASSISTANT

## 2021-01-27 PROCEDURE — 90471 IMMUNIZATION ADMIN: CPT | Performed by: PHYSICIAN ASSISTANT

## 2021-01-27 PROCEDURE — 99284 EMERGENCY DEPT VISIT MOD MDM: CPT | Mod: 25

## 2021-01-27 RX ORDER — CEPHALEXIN 250 MG/1
250 CAPSULE ORAL 4 TIMES DAILY
Qty: 28 CAPSULE | Refills: 0 | Status: SHIPPED | OUTPATIENT
Start: 2021-01-27 | End: 2021-02-03

## 2021-01-27 RX ORDER — DICLOFENAC SODIUM 50 MG/1
50 TABLET, DELAYED RELEASE ORAL 2 TIMES DAILY
Qty: 20 TABLET | Refills: 0 | Status: SHIPPED | OUTPATIENT
Start: 2021-01-27

## 2021-01-27 RX ADMIN — CLOSTRIDIUM TETANI TOXOID ANTIGEN (FORMALDEHYDE INACTIVATED), CORYNEBACTERIUM DIPHTHERIAE TOXOID ANTIGEN (FORMALDEHYDE INACTIVATED), BORDETELLA PERTUSSIS TOXOID ANTIGEN (GLUTARALDEHYDE INACTIVATED), BORDETELLA PERTUSSIS FILAMENTOUS HEMAGGLUTININ ANTIGEN (FORMALDEHYDE INACTIVATED), BORDETELLA PERTUSSIS PERTACTIN ANTIGEN, AND BORDETELLA PERTUSSIS FIMBRIAE 2/3 ANTIGEN 0.5 ML: 5; 2; 2.5; 5; 3; 5 INJECTION, SUSPENSION INTRAMUSCULAR at 10:01

## 2021-02-28 ENCOUNTER — HOSPITAL ENCOUNTER (EMERGENCY)
Facility: HOSPITAL | Age: 26
Discharge: HOME | End: 2021-02-28
Attending: EMERGENCY MEDICINE

## 2021-02-28 VITALS
OXYGEN SATURATION: 96 % | HEART RATE: 87 BPM | WEIGHT: 153.25 LBS | SYSTOLIC BLOOD PRESSURE: 122 MMHG | BODY MASS INDEX: 25.5 KG/M2 | RESPIRATION RATE: 16 BRPM | DIASTOLIC BLOOD PRESSURE: 78 MMHG | TEMPERATURE: 98 F

## 2021-02-28 DIAGNOSIS — S01.511A LIP LACERATION, INITIAL ENCOUNTER: Primary | ICD-10-CM

## 2021-02-28 PROCEDURE — 99284 EMERGENCY DEPT VISIT MOD MDM: CPT

## 2021-02-28 RX ORDER — CHLORHEXIDINE GLUCONATE ORAL RINSE 1.2 MG/ML
15 SOLUTION DENTAL 2 TIMES DAILY
Qty: 150 ML | Refills: 0 | Status: SHIPPED | OUTPATIENT
Start: 2021-02-28 | End: 2021-03-05

## 2021-02-28 RX ORDER — IBUPROFEN 600 MG/1
600 TABLET ORAL EVERY 6 HOURS PRN
Qty: 20 TABLET | Refills: 0 | Status: SHIPPED | OUTPATIENT
Start: 2021-02-28

## 2021-04-29 ENCOUNTER — PATIENT MESSAGE (OUTPATIENT)
Dept: RESEARCH | Facility: HOSPITAL | Age: 26
End: 2021-04-29

## 2021-11-21 ENCOUNTER — HOSPITAL ENCOUNTER (EMERGENCY)
Facility: HOSPITAL | Age: 26
Discharge: LEFT BEFORE DISCHARGE | End: 2021-11-21
Attending: EMERGENCY MEDICINE

## 2021-11-21 ENCOUNTER — HOSPITAL ENCOUNTER (EMERGENCY)
Facility: HOSPITAL | Age: 26
Discharge: LEFT BEFORE DISCHARGE | End: 2021-11-21

## 2021-11-21 VITALS
WEIGHT: 146 LBS | BODY MASS INDEX: 24.3 KG/M2 | TEMPERATURE: 98 F | RESPIRATION RATE: 16 BRPM | SYSTOLIC BLOOD PRESSURE: 132 MMHG | OXYGEN SATURATION: 99 % | HEART RATE: 77 BPM | DIASTOLIC BLOOD PRESSURE: 92 MMHG

## 2021-11-21 VITALS
SYSTOLIC BLOOD PRESSURE: 150 MMHG | HEART RATE: 98 BPM | RESPIRATION RATE: 20 BRPM | TEMPERATURE: 98 F | OXYGEN SATURATION: 99 % | DIASTOLIC BLOOD PRESSURE: 100 MMHG

## 2021-11-21 DIAGNOSIS — Z53.21 ELOPED FROM EMERGENCY DEPARTMENT: ICD-10-CM

## 2021-11-21 DIAGNOSIS — S61.012A LACERATION OF LEFT THUMB WITHOUT FOREIGN BODY WITHOUT DAMAGE TO NAIL, INITIAL ENCOUNTER: Primary | ICD-10-CM

## 2021-11-21 DIAGNOSIS — S61.219A LACERATION OF FINGER OF LEFT HAND WITHOUT FOREIGN BODY WITHOUT DAMAGE TO NAIL, UNSPECIFIED FINGER, INITIAL ENCOUNTER: Primary | ICD-10-CM

## 2021-11-21 PROCEDURE — 25000003 PHARM REV CODE 250: Performed by: NURSE PRACTITIONER

## 2021-11-21 PROCEDURE — 99282 EMERGENCY DEPT VISIT SF MDM: CPT | Mod: 27

## 2021-11-21 PROCEDURE — 99281 EMR DPT VST MAYX REQ PHY/QHP: CPT

## 2021-11-21 RX ORDER — LIDOCAINE HYDROCHLORIDE 10 MG/ML
10 INJECTION, SOLUTION EPIDURAL; INFILTRATION; INTRACAUDAL; PERINEURAL
Status: COMPLETED | OUTPATIENT
Start: 2021-11-21 | End: 2021-11-21

## 2021-11-21 RX ORDER — LIDOCAINE HYDROCHLORIDE 10 MG/ML
10 INJECTION, SOLUTION EPIDURAL; INFILTRATION; INTRACAUDAL; PERINEURAL
Status: DISCONTINUED | OUTPATIENT
Start: 2021-11-21 | End: 2021-11-21 | Stop reason: HOSPADM

## 2021-11-21 RX ADMIN — LIDOCAINE HYDROCHLORIDE 100 MG: 10 INJECTION, SOLUTION EPIDURAL; INFILTRATION; INTRACAUDAL at 08:11

## 2021-11-21 RX ADMIN — Medication: at 08:11

## 2023-07-16 ENCOUNTER — HOSPITAL ENCOUNTER (EMERGENCY)
Facility: HOSPITAL | Age: 28
Discharge: HOME | End: 2023-07-16
Attending: STUDENT IN AN ORGANIZED HEALTH CARE EDUCATION/TRAINING PROGRAM

## 2023-07-16 VITALS
WEIGHT: 151 LBS | HEIGHT: 65 IN | RESPIRATION RATE: 18 BRPM | BODY MASS INDEX: 25.16 KG/M2 | TEMPERATURE: 98 F | OXYGEN SATURATION: 99 % | HEART RATE: 89 BPM | DIASTOLIC BLOOD PRESSURE: 91 MMHG | SYSTOLIC BLOOD PRESSURE: 135 MMHG

## 2023-07-16 DIAGNOSIS — S02.832A CLOSED FRACTURE OF MEDIAL WALL OF LEFT ORBIT, INITIAL ENCOUNTER: ICD-10-CM

## 2023-07-16 DIAGNOSIS — S02.32XA CLOSED FRACTURE OF LEFT ORBITAL FLOOR, INITIAL ENCOUNTER: ICD-10-CM
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
## 2023-07-16 PROCEDURE — 99284 EMERGENCY DEPT VISIT MOD MDM: CPT | Mod: 25

## 2023-07-16 PROCEDURE — 25000003 PHARM REV CODE 250: Performed by: STUDENT IN AN ORGANIZED HEALTH CARE EDUCATION/TRAINING PROGRAM

## 2023-07-16 RX ORDER — HYDROCODONE BITARTRATE AND ACETAMINOPHEN 5; 325 MG/1; MG/1
1 TABLET ORAL
Status: COMPLETED | OUTPATIENT
Start: 2023-07-16 | End: 2023-07-16

## 2023-07-16 RX ORDER — AMOXICILLIN AND CLAVULANATE POTASSIUM 875; 125 MG/1; MG/1
1 TABLET, FILM COATED ORAL 2 TIMES DAILY
Qty: 14 TABLET | Refills: 0 | Status: SHIPPED | OUTPATIENT
Start: 2023-07-16

## 2023-07-16 RX ORDER — KETOROLAC TROMETHAMINE 10 MG/1
10 TABLET, FILM COATED ORAL EVERY 8 HOURS
Qty: 15 TABLET | Refills: 0 | Status: SHIPPED | OUTPATIENT
Start: 2023-07-16 | End: 2023-07-21

## 2023-07-16 RX ADMIN — HYDROCODONE BITARTRATE AND ACETAMINOPHEN 1 TABLET: 5; 325 TABLET ORAL at 01:07
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
## 2023-07-16 NOTE — Clinical Note
"Marcellaanil Duke (Kayla) was seen and treated in our emergency department on 7/16/2023.  She may return to work on 07/18/2023.       If you have any questions or concerns, please don't hesitate to call.      Tung Flower MD"

## 2023-11-01 ENCOUNTER — PATIENT OUTREACH (OUTPATIENT)
Dept: ADMINISTRATIVE | Facility: OTHER | Age: 28
End: 2023-11-01

## 2023-11-01 NOTE — PROGRESS NOTES
CHW - Outreach Attempt    Community Health Worker left a voicemail message for 2nd attempt to contact patient regarding: SDOH  Community Health Worker to attempt to contact patient on: [phone]

## 2024-04-25 ENCOUNTER — TELEPHONE (OUTPATIENT)
Dept: FAMILY MEDICINE | Facility: CLINIC | Age: 29
End: 2024-04-25
Payer: COMMERCIAL

## 2024-04-25 ENCOUNTER — TELEPHONE (OUTPATIENT)
Dept: FAMILY MEDICINE | Facility: CLINIC | Age: 29
End: 2024-04-25

## 2024-04-25 ENCOUNTER — LAB VISIT (OUTPATIENT)
Dept: LAB | Facility: HOSPITAL | Age: 29
End: 2024-04-25
Attending: STUDENT IN AN ORGANIZED HEALTH CARE EDUCATION/TRAINING PROGRAM
Payer: COMMERCIAL

## 2024-04-25 ENCOUNTER — OFFICE VISIT (OUTPATIENT)
Dept: FAMILY MEDICINE | Facility: CLINIC | Age: 29
End: 2024-04-25
Payer: COMMERCIAL

## 2024-04-25 VITALS
HEIGHT: 65 IN | HEART RATE: 78 BPM | DIASTOLIC BLOOD PRESSURE: 89 MMHG | WEIGHT: 155.75 LBS | SYSTOLIC BLOOD PRESSURE: 110 MMHG | BODY MASS INDEX: 25.95 KG/M2 | RESPIRATION RATE: 18 BRPM | OXYGEN SATURATION: 99 %

## 2024-04-25 DIAGNOSIS — T43.222A: ICD-10-CM

## 2024-04-25 DIAGNOSIS — N92.6 ABNORMAL MENSTRUAL CYCLE: ICD-10-CM

## 2024-04-25 DIAGNOSIS — J45.30 MILD PERSISTENT ASTHMA WITHOUT COMPLICATION: Primary | ICD-10-CM

## 2024-04-25 DIAGNOSIS — E66.3 OVERWEIGHT: ICD-10-CM

## 2024-04-25 DIAGNOSIS — F10.288 ALCOHOL DEPENDENCE WITH OTHER ALCOHOL-INDUCED DISORDER: ICD-10-CM

## 2024-04-25 DIAGNOSIS — Z20.2 ENCOUNTER FOR ASSESSMENT OF STD EXPOSURE: ICD-10-CM

## 2024-04-25 DIAGNOSIS — Z30.9 ENCOUNTER FOR CONTRACEPTIVE MANAGEMENT, UNSPECIFIED TYPE: ICD-10-CM

## 2024-04-25 DIAGNOSIS — H10.13 ALLERGIC CONJUNCTIVITIS OF BOTH EYES: ICD-10-CM

## 2024-04-25 DIAGNOSIS — F17.210 CIGARETTE NICOTINE DEPENDENCE WITHOUT COMPLICATION: ICD-10-CM

## 2024-04-25 PROBLEM — F10.20 ALCOHOL DEPENDENCE: Status: ACTIVE | Noted: 2020-02-22

## 2024-04-25 PROBLEM — F12.20 CANNABIS DEPENDENCE: Status: ACTIVE | Noted: 2020-02-22

## 2024-04-25 LAB
ALBUMIN SERPL BCP-MCNC: 3.8 G/DL (ref 3.5–5.2)
ALP SERPL-CCNC: 56 U/L (ref 55–135)
ALT SERPL W/O P-5'-P-CCNC: 16 U/L (ref 10–44)
ANION GAP SERPL CALC-SCNC: 10 MMOL/L (ref 8–16)
AST SERPL-CCNC: 35 U/L (ref 10–40)
BILIRUB SERPL-MCNC: 0.1 MG/DL (ref 0.1–1)
BUN SERPL-MCNC: 13 MG/DL (ref 6–20)
CALCIUM SERPL-MCNC: 8.9 MG/DL (ref 8.7–10.5)
CHLORIDE SERPL-SCNC: 107 MMOL/L (ref 95–110)
CHOLEST SERPL-MCNC: 150 MG/DL (ref 120–199)
CHOLEST/HDLC SERPL: 1.9 {RATIO} (ref 2–5)
CO2 SERPL-SCNC: 22 MMOL/L (ref 23–29)
CREAT SERPL-MCNC: 0.8 MG/DL (ref 0.5–1.4)
ERYTHROCYTE [DISTWIDTH] IN BLOOD BY AUTOMATED COUNT: 14 % (ref 11.5–14.5)
EST. GFR  (NO RACE VARIABLE): >60 ML/MIN/1.73 M^2
ESTIMATED AVG GLUCOSE: 94 MG/DL (ref 68–131)
GLUCOSE SERPL-MCNC: 77 MG/DL (ref 70–110)
HBA1C MFR BLD: 4.9 % (ref 4–5.6)
HCT VFR BLD AUTO: 41.4 % (ref 37–48.5)
HDLC SERPL-MCNC: 77 MG/DL (ref 40–75)
HDLC SERPL: 51.3 % (ref 20–50)
HGB BLD-MCNC: 13.7 G/DL (ref 12–16)
HIV 1+2 AB+HIV1 P24 AG SERPL QL IA: NORMAL
LDLC SERPL CALC-MCNC: 56.8 MG/DL (ref 63–159)
MCH RBC QN AUTO: 31.4 PG (ref 27–31)
MCHC RBC AUTO-ENTMCNC: 33.1 G/DL (ref 32–36)
MCV RBC AUTO: 95 FL (ref 82–98)
NONHDLC SERPL-MCNC: 73 MG/DL
PLATELET # BLD AUTO: 268 K/UL (ref 150–450)
PMV BLD AUTO: 11.1 FL (ref 9.2–12.9)
POTASSIUM SERPL-SCNC: 4.2 MMOL/L (ref 3.5–5.1)
PROT SERPL-MCNC: 7 G/DL (ref 6–8.4)
RBC # BLD AUTO: 4.37 M/UL (ref 4–5.4)
SODIUM SERPL-SCNC: 139 MMOL/L (ref 136–145)
TREPONEMA PALLIDUM IGG+IGM AB [PRESENCE] IN SERUM OR PLASMA BY IMMUNOASSAY: NONREACTIVE
TRIGL SERPL-MCNC: 81 MG/DL (ref 30–150)
TSH SERPL DL<=0.005 MIU/L-ACNC: 0.58 UIU/ML (ref 0.4–4)
WBC # BLD AUTO: 6.23 K/UL (ref 3.9–12.7)

## 2024-04-25 PROCEDURE — 85027 COMPLETE CBC AUTOMATED: CPT | Performed by: STUDENT IN AN ORGANIZED HEALTH CARE EDUCATION/TRAINING PROGRAM

## 2024-04-25 PROCEDURE — 1159F MED LIST DOCD IN RCRD: CPT | Mod: CPTII,S$GLB,, | Performed by: STUDENT IN AN ORGANIZED HEALTH CARE EDUCATION/TRAINING PROGRAM

## 2024-04-25 PROCEDURE — 84443 ASSAY THYROID STIM HORMONE: CPT | Performed by: STUDENT IN AN ORGANIZED HEALTH CARE EDUCATION/TRAINING PROGRAM

## 2024-04-25 PROCEDURE — 3008F BODY MASS INDEX DOCD: CPT | Mod: CPTII,S$GLB,, | Performed by: STUDENT IN AN ORGANIZED HEALTH CARE EDUCATION/TRAINING PROGRAM

## 2024-04-25 PROCEDURE — 99204 OFFICE O/P NEW MOD 45 MIN: CPT | Mod: S$GLB,,, | Performed by: STUDENT IN AN ORGANIZED HEALTH CARE EDUCATION/TRAINING PROGRAM

## 2024-04-25 PROCEDURE — 3079F DIAST BP 80-89 MM HG: CPT | Mod: CPTII,S$GLB,, | Performed by: STUDENT IN AN ORGANIZED HEALTH CARE EDUCATION/TRAINING PROGRAM

## 2024-04-25 PROCEDURE — 36415 COLL VENOUS BLD VENIPUNCTURE: CPT | Mod: PO | Performed by: STUDENT IN AN ORGANIZED HEALTH CARE EDUCATION/TRAINING PROGRAM

## 2024-04-25 PROCEDURE — 99999 PR PBB SHADOW E&M-EST. PATIENT-LVL IV: CPT | Mod: PBBFAC,,, | Performed by: STUDENT IN AN ORGANIZED HEALTH CARE EDUCATION/TRAINING PROGRAM

## 2024-04-25 PROCEDURE — 3074F SYST BP LT 130 MM HG: CPT | Mod: CPTII,S$GLB,, | Performed by: STUDENT IN AN ORGANIZED HEALTH CARE EDUCATION/TRAINING PROGRAM

## 2024-04-25 PROCEDURE — 80061 LIPID PANEL: CPT | Performed by: STUDENT IN AN ORGANIZED HEALTH CARE EDUCATION/TRAINING PROGRAM

## 2024-04-25 PROCEDURE — 80053 COMPREHEN METABOLIC PANEL: CPT | Performed by: STUDENT IN AN ORGANIZED HEALTH CARE EDUCATION/TRAINING PROGRAM

## 2024-04-25 PROCEDURE — 87389 HIV-1 AG W/HIV-1&-2 AB AG IA: CPT | Performed by: STUDENT IN AN ORGANIZED HEALTH CARE EDUCATION/TRAINING PROGRAM

## 2024-04-25 PROCEDURE — 83036 HEMOGLOBIN GLYCOSYLATED A1C: CPT | Performed by: STUDENT IN AN ORGANIZED HEALTH CARE EDUCATION/TRAINING PROGRAM

## 2024-04-25 PROCEDURE — 86593 SYPHILIS TEST NON-TREP QUANT: CPT | Performed by: STUDENT IN AN ORGANIZED HEALTH CARE EDUCATION/TRAINING PROGRAM

## 2024-04-25 PROCEDURE — 87591 N.GONORRHOEAE DNA AMP PROB: CPT | Performed by: STUDENT IN AN ORGANIZED HEALTH CARE EDUCATION/TRAINING PROGRAM

## 2024-04-25 RX ORDER — ALBUTEROL SULFATE 90 UG/1
1 AEROSOL, METERED RESPIRATORY (INHALATION) EVERY 6 HOURS PRN
Qty: 18 G | Refills: 0 | Status: SHIPPED | OUTPATIENT
Start: 2024-04-25

## 2024-04-25 RX ORDER — OLOPATADINE HYDROCHLORIDE 1 MG/ML
1 SOLUTION/ DROPS OPHTHALMIC NIGHTLY
Qty: 5 ML | Refills: 0 | Status: SHIPPED | OUTPATIENT
Start: 2024-04-25 | End: 2025-04-25

## 2024-04-25 RX ORDER — FLUTICASONE FUROATE 100 UG/1
1 POWDER RESPIRATORY (INHALATION) DAILY
Qty: 30 EACH | Refills: 2 | Status: SHIPPED | OUTPATIENT
Start: 2024-04-25 | End: 2024-07-24

## 2024-04-25 RX ORDER — FLUTICASONE PROPIONATE 110 UG/1
1 AEROSOL, METERED RESPIRATORY (INHALATION) 2 TIMES DAILY
Qty: 12 G | Refills: 3 | Status: SHIPPED | OUTPATIENT
Start: 2024-04-25 | End: 2024-04-25

## 2024-04-25 NOTE — ASSESSMENT & PLAN NOTE
Started fluticasone furoate as a maintenance inhaler and albuterol inhaler as needed  Encouraged smoking cessation

## 2024-04-25 NOTE — TELEPHONE ENCOUNTER
----- Message from Nas Delgado sent at 4/25/2024 11:25 AM CDT -----  Contact: NAS GARCIA [[id number]]  ..Type:  Patient Requesting Call    Who Called: NAS GARCIA [[id number]]  Does the patient know what this is regarding?: reports needing script to assist with drinking from 4/25 visit  Would the patient rather a call back or a response via [facility]?    Best Call Back Number: .[phone] (home)   Additional Information:     [id number] - [place] - [address] AT [facility]  [address]  [place] [address]  Phone: [phone] Fax: [phone]

## 2024-04-25 NOTE — PROGRESS NOTES
"     CLINIC NOTE      Marcella Duke is a 28 y.o. year old Black or  female with PMH as below. Pt presented to the clinic to establish care.  Problem list and medications reviewed  Patient has not seen primary care in years.  States that she is concerned about her menstrual cycle.  It is 11 days late and it has never happened before.  She has done her pregnancy test thrice at home which is negative.  Otherwise menstrual cycle is regular and denies any excessive bleeding.  Patient also wants to know if she can get ortho Evra patch for contraception management.  Patient smokes 2-3 cigarettes a day.  Discussed contraception methods including IUD, Nexplanon, progesterone pills and patient wants to think about it.      Has a h/o asthma since she was a kid.  She is out of her albuterol inhaler as needed and states that she is having exacerbation 3 to 4 times a week.  She has never been on maintenance inhaler before      Active Problem List with Overview Notes    Diagnosis Date Noted    Cigarette nicotine dependence without complication 04/25/2024    Alcohol dependence 02/22/2020    Cannabis dependence 02/22/2020    SSRI overdose, intentional self-harm, initial encounter 02/22/2020     History of suicidal attempt with SSRI overdose in 2020 for which she was hospitalized for 2 weeks.        History of high blood pressure 06/11/2019     Last Pregnancy, baby ASA dailt      Asthma 12/03/2015     Inhaler PRN           No problems with medications.  ROS: No fever, chills, cough, chest pain, shortness of breath, nausea, vomiting or diarrhea.    Vitals:    04/25/24 0743   BP: 110/89   BP Location: Right arm   Pulse: 78   Resp: 18   SpO2: 99%   Weight: 70.7 kg (155 lb 12.1 oz)   Height: 5' 5" (1.651 m)         Body mass index is 25.92 kg/m².   Wt Readings from Last 5 Encounters:   04/25/24 70.7 kg (155 lb 12.1 oz)   07/16/23 68.5 kg (151 lb 0.2 oz)   11/21/21 66.2 kg (146 lb)   02/28/21 69.5 kg (153 lb 3.5 oz) "   21 69 kg (152 lb 3.6 oz)       Past Medical History:   Diagnosis Date    Asthma     Hypertension affecting pregnancy in third trimester, antepartum 2016       Past Surgical History:   Procedure Laterality Date    none         Family History   Problem Relation Name Age of Onset    Stroke Paternal Grandmother      Hypertension Maternal Grandmother      Breast cancer Neg Hx      Cancer Neg Hx      Colon cancer Neg Hx      Diabetes Neg Hx      Eclampsia Neg Hx      Miscarriages / Stillbirths Neg Hx      Ovarian cancer Neg Hx       labor Neg Hx         Social History     Socioeconomic History    Marital status: Single   Tobacco Use    Smoking status: Never    Smokeless tobacco: Never   Substance and Sexual Activity    Alcohol use: Yes     Comment: occ    Drug use: No    Sexual activity: Yes     Partners: Male     Birth control/protection: None     Social Determinants of Health     Financial Resource Strain: Medium Risk (2020)    Received from [employer] and Its Subsidiaries and Affiliates    Overall Financial Resource Strain (CARDIA)     Difficulty of Paying Living Expenses: Somewhat hard   Food Insecurity: Food Insecurity Present (2020)    Received from [employer] [facility] and Its Subsidiaries and Affiliates    Hunger Vital Sign     Worried About Running Out of Food in the Last Year: Often true     Ran Out of Food in the Last Year: Often true   Transportation Needs: No Transportation Needs (2020)    Received from [facility] and Its Subsidiaries and Affiliates    PRAPARE - Transportation     Lack of Transportation (Medical): No     Lack of Transportation (Non-Medical): No   Stress: Stress Concern Present (2020)    Received from [facility] [facility] [facility] Its Subsidiaries and Affiliates    [employer] [place] [employer] - Occupational  Stress Questionnaire     Feeling of Stress : To some extent   Social Connections: Somewhat Isolated (2/21/2020)    [facility] and Affiliates    Social Connection and Isolation Panel [NHANES]     Frequency of Communication with Friends and Family: More than three times a week     Frequency of Social Gatherings with Friends and Family: Once a week     Attends Protestant Services: Never     Active Member of Clubs or Organizations: Yes     Attends Club or Organization Meetings: Never     Marital Status: Never        Current Outpatient Medications   Medication Sig Dispense Refill    albuterol (VENTOLIN HFA) 90 mcg/actuation inhaler Inhale 1 puff into the lungs every 6 (six) hours as needed for Wheezing. Rescue 18 g 0    docusate sodium (COLACE) 100 MG capsule Take 2 capsules (200 mg total) by mouth 2 (two) times daily as needed for Constipation. (Patient not taking: Reported on 11/20/2019) 30 capsule 0    fluticasone furoate (ARNUITY ELLIPTA) 100 mcg/actuation inhaler Inhale 1 puff into the lungs once daily. Controller 30 each 2    norelgestromin-ethinyl estradiol (ORTHO EVRA) 150-35 mcg/24 hr Place 1 patch onto the skin every 7 days. (Patient not taking: Reported on 4/25/2024) 4 patch 11    olopatadine (PATANOL) 0.1 % ophthalmic solution Place 1 drop into both eyes every evening. 5 mL 0     No current facility-administered medications for this visit.       Review of patient's allergies indicates:   Allergen Reactions    Apple Other (See Comments)     Makes mouth tingle    Banana Other (See Comments)     Makes mouth tingle         PHYSICAL EXAM:  General - Well developed, alert and oriented in NAD  HEENT - normocephalic, no evidence of trauma, sclera white, EOMI  Neck - full range of motion  COR - regular rate and rhythm without murmurs or gallops  Lungs - Clear  Abdomen - soft, non-tender  Ext - no cyanosis or edema    Lab Results  "  Component Value Date    WBC 11.84 10/02/2019    HGB 11.1 (L) 10/02/2019    HCT 35.2 (L) 10/02/2019    MCV 84 10/02/2019    MCH 26.6 (L) 10/02/2019    MCHC 31.5 (L) 10/02/2019    RDW 14.4 10/02/2019     10/02/2019    MPV 11.7 10/02/2019       Lab Results   Component Value Date     (L) 06/28/2019    K 3.9 06/28/2019     06/28/2019    CO2 19 (L) 06/28/2019    GLU 84 06/28/2019    BUN 10 06/28/2019    CALCIUM 8.6 (L) 06/28/2019    PROT 7.0 06/28/2019    ALBUMIN 3.2 (L) 06/28/2019    BILITOT 0.2 06/28/2019    AST 16 06/28/2019    ALKPHOS 56 06/28/2019    ALT 9 (L) 06/28/2019       No results found for: "TRIG", "CHOL", "HDL", "LDLCALC", "CHOLHDL", "NONHDLC"      No results found for: "LABA1C", "HGBA1C"    No results found for: "MICROALBUR", "ABDS20PVR"          Impression:  1. Mild persistent asthma without complication  albuterol (VENTOLIN HFA) 90 mcg/actuation inhaler    DISCONTINUED: fluticasone propionate (FLOVENT HFA) 110 mcg/actuation inhaler      2. Encounter for contraceptive management, unspecified type  Ambulatory referral/consult to Obstetrics / Gynecology      3. Allergic conjunctivitis of both eyes  olopatadine (PATANOL) 0.1 % ophthalmic solution      4. Abnormal menstrual cycle  TSH      5. Alcohol dependence with other alcohol-induced disorder  CBC Without Differential    COMPREHENSIVE METABOLIC PANEL    Lipid Panel      6. Overweight  HEMOGLOBIN A1C      7. Encounter for assessment of STD exposure  HIV 1/2 Ag/Ab (4th Gen)    C. trachomatis/N. gonorrhoeae by AMP DNA    Treponema Pallidium Antibodies IgG, IgM    Treponema Pallidium Antibodies IgG, IgM    CANCELED: RPR    CANCELED: RPR      8. Cigarette nicotine dependence without complication        9. SSRI overdose, intentional self-harm, initial encounter             Plan: 1. Mild persistent asthma without complication    Overview:  Inhaler PRN    Orders:  -     albuterol (VENTOLIN HFA) 90 mcg/actuation inhaler; Inhale 1 puff into the " lungs every 6 (six) hours as needed for Wheezing. Rescue  Dispense: 18 g; Refill: 0    2. Encounter for contraceptive management, unspecified type  Patient also wants to know if she can get ortho Evra patch for contraception management.  Patient smokes 2-3 cigarettes a day.  Discussed contraception methods including IUD, Nexplanon, progesterone pills and patient wants to think about it.  -     Ambulatory referral/consult to Obstetrics / Gynecology; Future; Expected date: 05/02/2024    3. Allergic conjunctivitis of both eyes    -     olopatadine (PATANOL) 0.1 % ophthalmic solution; Place 1 drop into both eyes every evening.  Dispense: 5 mL; Refill: 0    4. Abnormal menstrual cycle  Discussed menstrual cycles can be irregular sometimes  Here pregnancy test has been negative at home  Continue to wait and watch, notify if menstrual cycles does not come in 1 month  Referral placed to OBGYN  -     TSH; Future; Expected date: 04/25/2024    5. Alcohol dependence with other alcohol-induced disorder  Drinks  1.5 pt of whiskey everyday  She will think about quitting  Encouraged abstinence and encouraged patient to look for counseling sessions  Consider medication management in the next visit  -     CBC Without Differential; Future; Expected date: 04/25/2024  -     COMPREHENSIVE METABOLIC PANEL; Future; Expected date: 04/25/2024  -     Lipid Panel; Future; Expected date: 04/25/2024    6. Overweight    -     HEMOGLOBIN A1C; Future; Expected date: 04/25/2024    7. Encounter for assessment of STD exposure    -     HIV 1/2 Ag/Ab (4th Gen); Future; Expected date: 04/25/2024  -     C. trachomatis/N. gonorrhoeae by AMP DNA  -     Treponema Pallidium Antibodies IgG, IgM; Future; Expected date: 04/25/2024  -     Treponema Pallidium Antibodies IgG, IgM; Future; Expected date: 04/25/2024    8. Cigarette nicotine dependence without complication  It is very important that she quit smoking. There are various alternatives available to help  with this difficult task, but first and foremost, she must make a firm commitment and decision to quit.         Orders Placed This Encounter   Procedures    TSH    CBC Without Differential    COMPREHENSIVE METABOLIC PANEL    Lipid Panel    HEMOGLOBIN A1C    HIV 1/2 Ag/Ab (4th Gen)    C. trachomatis/N. gonorrhoeae by AMP DNA    Treponema Pallidium Antibodies IgG, IgM    Treponema Pallidium Antibodies IgG, IgM    Ambulatory referral/consult to Obstetrics / Gynecology           Follow up in about 6 months (around 10/25/2024) for regular follow up .     I spent a total of 50 minutes on the day of the visit.This includes face to face time and non-face to face time preparing to see the patient (eg, review of tests), obtaining and/or reviewing separately obtained history, documenting clinical information in the electronic or other health record, independently interpreting results and communicating results to the patient/family/caregiver, or care coordinator.      This note is generated with speech recognition software and is subject to transcription error and sound alike phrases that may be missed by proofreading      Elisa Wyatt MD

## 2024-04-25 NOTE — ASSESSMENT & PLAN NOTE
Drinks 1-1/2 pt of whiskey everyday for around 3 years.   She will think about quitting  Discuss the side-effects of alcohol and medication management  Encouraged patient to look for counseling

## 2024-04-25 NOTE — ASSESSMENT & PLAN NOTE
It is very important that she quit smoking. There are various alternatives available to help with this difficult task, but first and foremost, she must make a firm commitment and decision to quit.

## 2024-04-26 LAB
C TRACH DNA SPEC QL NAA+PROBE: NOT DETECTED
N GONORRHOEA DNA SPEC QL NAA+PROBE: NOT DETECTED

## 2024-05-01 ENCOUNTER — PATIENT MESSAGE (OUTPATIENT)
Dept: FAMILY MEDICINE | Facility: CLINIC | Age: 29
End: 2024-05-01
Payer: COMMERCIAL

## 2024-05-21 ENCOUNTER — TELEPHONE (OUTPATIENT)
Dept: OBSTETRICS AND GYNECOLOGY | Facility: CLINIC | Age: 29
End: 2024-05-21
Payer: COMMERCIAL

## 2024-05-21 NOTE — TELEPHONE ENCOUNTER
Lvm for pt to return call to get appointment scheduled with our department.   Received referral for consult- birth control.

## 2024-06-05 ENCOUNTER — TELEPHONE (OUTPATIENT)
Dept: OBSTETRICS AND GYNECOLOGY | Facility: CLINIC | Age: 29
End: 2024-06-05
Payer: COMMERCIAL

## 2024-06-05 NOTE — TELEPHONE ENCOUNTER
Lvm for pt to return call to get appointment scheduled with our department.   Received referral for Encounter for contraceptive management

## 2024-06-20 ENCOUNTER — TELEPHONE (OUTPATIENT)
Dept: OBSTETRICS AND GYNECOLOGY | Facility: CLINIC | Age: 29
End: 2024-06-20
Payer: COMMERCIAL

## 2024-07-09 ENCOUNTER — PATIENT MESSAGE (OUTPATIENT)
Dept: RESEARCH | Facility: HOSPITAL | Age: 29
End: 2024-07-09
Payer: COMMERCIAL

## 2024-07-29 DIAGNOSIS — J45.30 MILD PERSISTENT ASTHMA WITHOUT COMPLICATION: Primary | ICD-10-CM

## 2024-07-29 RX ORDER — FLUTICASONE PROPIONATE 110 UG/1
1 AEROSOL, METERED RESPIRATORY (INHALATION) 2 TIMES DAILY
Qty: 12 G | Refills: 3 | Status: SHIPPED | OUTPATIENT
Start: 2024-07-29

## 2024-07-29 RX ORDER — FLUTICASONE PROPIONATE 110 UG/1
1 AEROSOL, METERED RESPIRATORY (INHALATION) 2 TIMES DAILY
COMMUNITY
End: 2024-07-29 | Stop reason: SDUPTHER

## 2024-07-31 ENCOUNTER — NURSE TRIAGE (OUTPATIENT)
Dept: ADMINISTRATIVE | Facility: CLINIC | Age: 29
End: 2024-07-31
Payer: COMMERCIAL

## 2024-07-31 NOTE — TELEPHONE ENCOUNTER
[facility] Took dose  yesterday around 6 pm, couple shots of whisky, 7:30 pm. Patient is concerned re reading drinking alcohol is contraindicated. Concerned re bleeding possibility. Voices no complaints now. Triage done- dispo call Poison Control. Number given, verb understanding and will call.   Reason for Disposition   DOUBLE DOSE (an extra dose or lesser amount) of prescription drug and any symptoms (e.g., dizziness, nausea, pain, sleepiness)    Additional Information   Negative: Intentional drug overdose and suicidal thoughts or ideas    Protocols used: Medication Question Call-A-[place]

## 2024-09-12 ENCOUNTER — PATIENT MESSAGE (OUTPATIENT)
Dept: FAMILY MEDICINE | Facility: CLINIC | Age: 29
End: 2024-09-12
Payer: COMMERCIAL

## 2024-10-09 ENCOUNTER — HOSPITAL ENCOUNTER (EMERGENCY)
Facility: HOSPITAL | Age: 29
Discharge: LEFT BEFORE DISCHARGE | End: 2024-10-09
Attending: EMERGENCY MEDICINE
Payer: COMMERCIAL

## 2024-10-09 VITALS
OXYGEN SATURATION: 100 % | DIASTOLIC BLOOD PRESSURE: 98 MMHG | TEMPERATURE: 99 F | BODY MASS INDEX: 25.92 KG/M2 | HEIGHT: 65 IN | HEART RATE: 97 BPM | RESPIRATION RATE: 18 BRPM | SYSTOLIC BLOOD PRESSURE: 135 MMHG

## 2024-10-09 DIAGNOSIS — Z53.21 ELOPED FROM EMERGENCY DEPARTMENT: ICD-10-CM

## 2024-10-09 DIAGNOSIS — R07.9 CHEST PAIN: ICD-10-CM

## 2024-10-09 DIAGNOSIS — R06.02 SOB (SHORTNESS OF BREATH): Primary | ICD-10-CM

## 2024-10-09 LAB
ALBUMIN SERPL BCP-MCNC: 4 G/DL (ref 3.5–5.2)
ALP SERPL-CCNC: 62 U/L (ref 55–135)
ALT SERPL W/O P-5'-P-CCNC: 17 U/L (ref 10–44)
ANION GAP SERPL CALC-SCNC: 15 MMOL/L (ref 8–16)
AST SERPL-CCNC: 32 U/L (ref 10–40)
BASOPHILS # BLD AUTO: 0.02 K/UL (ref 0–0.2)
BASOPHILS NFR BLD: 0.4 % (ref 0–1.9)
BILIRUB SERPL-MCNC: 0.3 MG/DL (ref 0.1–1)
BUN SERPL-MCNC: 10 MG/DL (ref 6–20)
CALCIUM SERPL-MCNC: 9.1 MG/DL (ref 8.7–10.5)
CHLORIDE SERPL-SCNC: 111 MMOL/L (ref 95–110)
CO2 SERPL-SCNC: 18 MMOL/L (ref 23–29)
CREAT SERPL-MCNC: 0.8 MG/DL (ref 0.5–1.4)
DIFFERENTIAL METHOD BLD: ABNORMAL
EOSINOPHIL # BLD AUTO: 0 K/UL (ref 0–0.5)
EOSINOPHIL NFR BLD: 0.9 % (ref 0–8)
ERYTHROCYTE [DISTWIDTH] IN BLOOD BY AUTOMATED COUNT: 14.5 % (ref 11.5–14.5)
EST. GFR  (NO RACE VARIABLE): >60 ML/MIN/1.73 M^2
GLUCOSE SERPL-MCNC: 104 MG/DL (ref 70–110)
HCT VFR BLD AUTO: 43.1 % (ref 37–48.5)
HGB BLD-MCNC: 15.2 G/DL (ref 12–16)
IMM GRANULOCYTES # BLD AUTO: 0.01 K/UL (ref 0–0.04)
IMM GRANULOCYTES NFR BLD AUTO: 0.2 % (ref 0–0.5)
LYMPHOCYTES # BLD AUTO: 1.9 K/UL (ref 1–4.8)
LYMPHOCYTES NFR BLD: 42.8 % (ref 18–48)
MCH RBC QN AUTO: 31 PG (ref 27–31)
MCHC RBC AUTO-ENTMCNC: 35.3 G/DL (ref 32–36)
MCV RBC AUTO: 88 FL (ref 82–98)
MONOCYTES # BLD AUTO: 0.2 K/UL (ref 0.3–1)
MONOCYTES NFR BLD: 4.5 % (ref 4–15)
NEUTROPHILS # BLD AUTO: 2.3 K/UL (ref 1.8–7.7)
NEUTROPHILS NFR BLD: 51.2 % (ref 38–73)
NRBC BLD-RTO: 0 /100 WBC
PLATELET # BLD AUTO: 207 K/UL (ref 150–450)
PMV BLD AUTO: 10.3 FL (ref 9.2–12.9)
POTASSIUM SERPL-SCNC: 3.7 MMOL/L (ref 3.5–5.1)
PROT SERPL-MCNC: 7.5 G/DL (ref 6–8.4)
RBC # BLD AUTO: 4.9 M/UL (ref 4–5.4)
SODIUM SERPL-SCNC: 144 MMOL/L (ref 136–145)
TROPONIN I SERPL DL<=0.01 NG/ML-MCNC: <0.006 NG/ML (ref 0–0.03)
WBC # BLD AUTO: 4.49 K/UL (ref 3.9–12.7)

## 2024-10-09 PROCEDURE — 96374 THER/PROPH/DIAG INJ IV PUSH: CPT

## 2024-10-09 PROCEDURE — 80053 COMPREHEN METABOLIC PANEL: CPT | Performed by: NURSE PRACTITIONER

## 2024-10-09 PROCEDURE — 99285 EMERGENCY DEPT VISIT HI MDM: CPT | Mod: 25

## 2024-10-09 PROCEDURE — 93005 ELECTROCARDIOGRAM TRACING: CPT

## 2024-10-09 PROCEDURE — 93010 ELECTROCARDIOGRAM REPORT: CPT | Mod: ,,, | Performed by: STUDENT IN AN ORGANIZED HEALTH CARE EDUCATION/TRAINING PROGRAM

## 2024-10-09 PROCEDURE — 63600175 PHARM REV CODE 636 W HCPCS: Performed by: NURSE PRACTITIONER

## 2024-10-09 PROCEDURE — 85025 COMPLETE CBC W/AUTO DIFF WBC: CPT | Performed by: NURSE PRACTITIONER

## 2024-10-09 PROCEDURE — 84484 ASSAY OF TROPONIN QUANT: CPT | Performed by: NURSE PRACTITIONER

## 2024-10-09 RX ORDER — HYDROXYZINE PAMOATE 25 MG/1
25 CAPSULE ORAL
Status: DISCONTINUED | OUTPATIENT
Start: 2024-10-09 | End: 2024-10-09 | Stop reason: HOSPADM

## 2024-10-09 RX ORDER — IPRATROPIUM BROMIDE AND ALBUTEROL SULFATE 2.5; .5 MG/3ML; MG/3ML
3 SOLUTION RESPIRATORY (INHALATION)
Status: DISCONTINUED | OUTPATIENT
Start: 2024-10-09 | End: 2024-10-09 | Stop reason: HOSPADM

## 2024-10-09 RX ORDER — METHYLPREDNISOLONE SOD SUCC 125 MG
125 VIAL (EA) INJECTION
Status: COMPLETED | OUTPATIENT
Start: 2024-10-09 | End: 2024-10-09

## 2024-10-09 RX ADMIN — METHYLPREDNISOLONE SODIUM SUCCINATE 125 MG: 125 INJECTION, POWDER, FOR SOLUTION INTRAMUSCULAR; INTRAVENOUS at 03:10

## 2024-10-09 NOTE — ED PROVIDER NOTES
Encounter Date: 10/9/2024       History     Chief Complaint   Patient presents with    Shortness of Breath     Patient presents to ED with c/o SOB and anxiety. Patients child has a viral respiratory illness and patient states she has been exposed. Patient has been using her albuterol inhaler without any relief.     28-year-old female presents the emergency department for shortness of breath.  Patient reports she has been having shortness of breath and chest pain for several days.  Patient also reports a medical history of hypertension.  Patient denies any fever, chills, back pain, abdominal pain, nausea, vomiting, and all other concerns at this time.        Review of patient's allergies indicates:   Allergen Reactions    Apple Other (See Comments)     Makes mouth tingle    Banana Other (See Comments)     Makes mouth tingle     Past Medical History:   Diagnosis Date    Asthma     Hypertension affecting pregnancy in third trimester, antepartum 2016     Past Surgical History:   Procedure Laterality Date    none       Family History   Problem Relation Name Age of Onset    Stroke Paternal Grandmother      Hypertension Maternal Grandmother      Breast cancer Neg Hx      Cancer Neg Hx      Colon cancer Neg Hx      Diabetes Neg Hx      Eclampsia Neg Hx      Miscarriages / Stillbirths Neg Hx      Ovarian cancer Neg Hx       labor Neg Hx       Social History     Tobacco Use    Smoking status: Never    Smokeless tobacco: Never   Substance Use Topics    Alcohol use: Yes     Comment: occ    Drug use: No     Review of Systems   Constitutional:  Negative for fever.   HENT:  Negative for sore throat.    Respiratory:  Positive for shortness of breath.    Cardiovascular:  Positive for chest pain.   Gastrointestinal:  Negative for abdominal pain, nausea and vomiting.   Genitourinary:  Negative for dysuria.   Musculoskeletal:  Negative for back pain.   Skin:  Negative for rash.   Neurological:  Negative for weakness.    Hematological:  Does not bruise/bleed easily.       Physical Exam     Initial Vitals [10/09/24 1514]   BP Pulse Resp Temp SpO2   (!) 135/98 97 18 98.9 °F (37.2 °C) 100 %      MAP       --         Physical Exam    Nursing note and vitals reviewed.  Constitutional: She appears well-developed and well-nourished. She is not diaphoretic. No distress.   HENT:   Head: Normocephalic and atraumatic.   Eyes: Right eye exhibits no discharge. Left eye exhibits no discharge.   Neck: Neck supple.   Normal range of motion.  Cardiovascular:  Normal rate.           Pulmonary/Chest: Breath sounds normal. No respiratory distress. She has no wheezes. She has no rhonchi. She has no rales.   Abdominal: She exhibits no distension.   Musculoskeletal:         General: Normal range of motion.      Cervical back: Normal range of motion and neck supple.     Neurological: She is alert and oriented to person, place, and time. She has normal strength.   Skin: Skin is warm and dry.   Psychiatric: She has a normal mood and affect. Her behavior is normal. Thought content normal.         ED Course   Procedures  Labs Reviewed   CBC W/ AUTO DIFFERENTIAL - Abnormal       Result Value    WBC 4.49      RBC 4.90      Hemoglobin 15.2      Hematocrit 43.1      MCV 88      MCH 31.0      MCHC 35.3      RDW 14.5      Platelets 207      MPV 10.3      Immature Granulocytes 0.2      Gran # (ANC) 2.3      Immature Grans (Abs) 0.01      Lymph # 1.9      Mono # 0.2 (*)     Eos # 0.0      Baso # 0.02      nRBC 0      Gran % 51.2      Lymph % 42.8      Mono % 4.5      Eosinophil % 0.9      Basophil % 0.4      Differential Method Automated     COMPREHENSIVE METABOLIC PANEL - Abnormal    Sodium 144      Potassium 3.7      Chloride 111 (*)     CO2 18 (*)     Glucose 104      BUN 10      Creatinine 0.8      Calcium 9.1      Total Protein 7.5      Albumin 4.0      Total Bilirubin 0.3      Alkaline Phosphatase 62      AST 32      ALT 17      eGFR >60      Anion Gap 15      TROPONIN I    Troponin I <0.006     PREGNANCY TEST, URINE RAPID     EKG Readings: (Independently Interpreted)   Initial Reading: No STEMI. Rhythm: Normal Sinus Rhythm. Heart Rate: 86.     ECG Results              EKG 12-lead (In process)        Collection Time Result Time QRS Duration OHS QTC Calculation    10/09/24 16:00:50 10/09/24 16:22:59 112 471                     In process by Interface, Lab In [facility] (10/09/24 16:23:09)                   Narrative:    Test Reason : R07.9,    Vent. Rate : 086 BPM     Atrial Rate : 086 BPM     P-R Int : 154 ms          QRS Dur : 112 ms      QT Int : 394 ms       P-R-T Axes : 083 -31 042 degrees     QTc Int : 471 ms    Normal sinus rhythm  Possible Left atrial enlargement  Left axis deviation  Pulmonary disease pattern  Incomplete right bundle branch block  Abnormal ECG  No previous ECGs available    Referred By: [email or web address]   SELF           Confirmed By:                                   Imaging Results              X-Ray Chest AP Portable (Final result)  Result time 10/09/24 16:38:25      Final result by Ramy Schwab MD (10/09/24 16:38:25)                   Impression:      No acute abnormality.      Electronically signed by: Ramy Schwab  Date:    10/09/2024  Time:    16:38               Narrative:    EXAMINATION:  XR CHEST AP PORTABLE    CLINICAL HISTORY:  Chest Pain;    TECHNIQUE:  Single frontal view of the chest was performed.    COMPARISON:  04/11/2018    FINDINGS:  The lungs are clear, with normal appearance of pulmonary vasculature and no pleural effusion or pneumothorax.    The cardiac silhouette is normal in size. The hilar and mediastinal contours are unremarkable.    Bones are intact.                                       Medications   albuterol-ipratropium 2.5 mg-0.5 mg/3 mL nebulizer solution 3 mL (has no administration in time range)   hydrOXYzine pamoate capsule 25 mg (has no administration in time range)   methylPREDNISolone sodium succinate injection  125 mg (125 mg Intravenous Given 10/9/24 1533)     Medical Decision Making  Amount and/or Complexity of Data Reviewed  Labs: ordered.  Radiology: ordered.  Discussion of management or test interpretation with external provider(s): Prior to results cutting back on patient it was noted to me by nursing staff that patient left.  Nursing staff called patient on cell phone and she states that she left the emergency department and would not be coming back.  States that she was going to another health care facility.    Risk  Prescription drug management.                                      Clinical Impression:  Final diagnoses:  [R07.9] Chest pain  [R06.02] SOB (shortness of breath) (Primary)  [Z53.21] Eloped from emergency department          ED Disposition Condition    Eloped Stable                French Bello, NP  10/09/24 8688

## 2024-10-09 NOTE — ED NOTES
Pt could not be found in [facility] to be given medication. Another pt stated she left the room. Pt did not return after 10 minutes and was called on her cell phone. Pt reported she left to go to another hospital. Pt was informed she needed to return to have her IV removed and that she could not leave with the IV. Pt hung up phone abruptly.

## 2024-10-10 ENCOUNTER — TELEPHONE (OUTPATIENT)
Dept: FAMILY MEDICINE | Facility: CLINIC | Age: 29
End: 2024-10-10
Payer: COMMERCIAL

## 2024-10-10 NOTE — TELEPHONE ENCOUNTER
----- Message from Karen sent at 10/10/2024  2:46 PM CDT -----  Contact: Marcella Naranjo would like a call back at [phone] in regards to needing a PA for medication, fluticasone propionate (FLOVENT HFA) 110 mcg/actuation inhaler sent over to her pharmacy in order for them to fill the medication.   [id number] - [place] [place] [id number] [facility] [address] [address]  Phone: [phone] Fax: [phone]  Thanks   Am

## 2024-10-11 ENCOUNTER — TELEPHONE (OUTPATIENT)
Dept: FAMILY MEDICINE | Facility: CLINIC | Age: 29
End: 2024-10-11
Payer: COMMERCIAL

## 2024-10-12 LAB
OHS QRS DURATION: 112 MS
OHS QTC CALCULATION: 471 MS

## 2024-10-15 ENCOUNTER — TELEPHONE (OUTPATIENT)
Dept: FAMILY MEDICINE | Facility: CLINIC | Age: 29
End: 2024-10-15
Payer: COMMERCIAL

## 2024-10-21 DIAGNOSIS — J45.30 MILD PERSISTENT ASTHMA WITHOUT COMPLICATION: Primary | ICD-10-CM

## 2024-10-21 NOTE — TELEPHONE ENCOUNTER
No care due was identified.  [facility] Embedded Care Due Messages. Reference number: [id number].   10/21/2024 7:43:27 AM CDT

## 2024-10-23 RX ORDER — TIOTROPIUM BROMIDE AND OLODATEROL 3.124; 2.736 UG/1; UG/1
SPRAY, METERED RESPIRATORY (INHALATION)
Qty: 4 G | Refills: 3 | Status: SHIPPED | OUTPATIENT
Start: 2024-10-23

## 2024-10-23 NOTE — TELEPHONE ENCOUNTER
No care due was identified.  [facility] Embedded Care Due Messages. Reference number: [id number].   10/23/2024 2:14:31 PM CDT

## 2024-11-05 ENCOUNTER — PATIENT MESSAGE (OUTPATIENT)
Dept: RESEARCH | Facility: HOSPITAL | Age: 29
End: 2024-11-05
Payer: COMMERCIAL

## 2024-12-10 ENCOUNTER — PATIENT MESSAGE (OUTPATIENT)
Dept: FAMILY MEDICINE | Facility: CLINIC | Age: 29
End: 2024-12-10
Payer: COMMERCIAL

## 2024-12-10 ENCOUNTER — TELEPHONE (OUTPATIENT)
Dept: FAMILY MEDICINE | Facility: CLINIC | Age: 29
End: 2024-12-10
Payer: COMMERCIAL

## 2024-12-10 NOTE — TELEPHONE ENCOUNTER
----- Message from Rachael sent at 12/10/2024  9:41 AM CST -----  Name of Who is Calling:Pt         What is the request in detail: Pt would like a call back in regards to a injury from a fall and having a headaches. Pt stated that they have went to the ER .Pt has denied symptom screener advise.   Please advise thank you         Can the clinic reply by [facility]:no        What Number to Call Back if not in [facility]:Telephone Information:  Mobile          [phone]

## 2024-12-10 NOTE — TELEPHONE ENCOUNTER
Attempted to call patient, there was no answer, message left to call the clinic back at their convenience.     See My Chart message

## 2024-12-12 ENCOUNTER — OFFICE VISIT (OUTPATIENT)
Dept: FAMILY MEDICINE | Facility: CLINIC | Age: 29
End: 2024-12-12
Payer: COMMERCIAL

## 2024-12-12 VITALS
HEART RATE: 83 BPM | SYSTOLIC BLOOD PRESSURE: 132 MMHG | WEIGHT: 153.56 LBS | DIASTOLIC BLOOD PRESSURE: 80 MMHG | BODY MASS INDEX: 25.55 KG/M2 | OXYGEN SATURATION: 99 %

## 2024-12-12 DIAGNOSIS — G44.309 POST-CONCUSSION HEADACHE: Primary | ICD-10-CM

## 2024-12-12 DIAGNOSIS — M54.2 NECK PAIN: ICD-10-CM

## 2024-12-12 DIAGNOSIS — M25.562 POSTERIOR LEFT KNEE PAIN: ICD-10-CM

## 2024-12-12 PROCEDURE — 99999 PR PBB SHADOW E&M-EST. PATIENT-LVL III: CPT | Mod: PBBFAC,,, | Performed by: FAMILY MEDICINE

## 2024-12-12 PROCEDURE — 1159F MED LIST DOCD IN RCRD: CPT | Mod: CPTII,S$GLB,, | Performed by: FAMILY MEDICINE

## 2024-12-12 PROCEDURE — 3075F SYST BP GE 130 - 139MM HG: CPT | Mod: CPTII,S$GLB,, | Performed by: FAMILY MEDICINE

## 2024-12-12 PROCEDURE — 99214 OFFICE O/P EST MOD 30 MIN: CPT | Mod: S$GLB,,, | Performed by: FAMILY MEDICINE

## 2024-12-12 PROCEDURE — 3044F HG A1C LEVEL LT 7.0%: CPT | Mod: CPTII,S$GLB,, | Performed by: FAMILY MEDICINE

## 2024-12-12 PROCEDURE — 3008F BODY MASS INDEX DOCD: CPT | Mod: CPTII,S$GLB,, | Performed by: FAMILY MEDICINE

## 2024-12-12 PROCEDURE — 3079F DIAST BP 80-89 MM HG: CPT | Mod: CPTII,S$GLB,, | Performed by: FAMILY MEDICINE

## 2024-12-12 RX ORDER — PREDNISONE 50 MG/1
50 TABLET ORAL DAILY
Qty: 3 TABLET | Refills: 0 | Status: SHIPPED | OUTPATIENT
Start: 2024-12-12 | End: 2024-12-15

## 2024-12-12 RX ORDER — CELECOXIB 100 MG/1
100 CAPSULE ORAL DAILY
Qty: 30 CAPSULE | Refills: 1 | Status: SHIPPED | OUTPATIENT
Start: 2024-12-12

## 2024-12-12 RX ORDER — PROMETHAZINE HYDROCHLORIDE 12.5 MG/1
12.5 TABLET ORAL EVERY 6 HOURS PRN
Qty: 20 TABLET | Refills: 1 | Status: SHIPPED | OUTPATIENT
Start: 2024-12-12

## 2024-12-12 NOTE — LETTER
December 12, 2024    Marcella Duke  [address] [facility] [address]             [facility]  Family Medicine  [address]  [facility] [address]  Phone: [phone]  Fax: [phone]   December 12, 2024     Patient: Marcella Duke   YOB: 1995   Date of Visit: 12/12/2024       To Whom it May Concern:    Marcella Duke was seen in my clinic on 12/12/2024. She may return to work on 12/16/2024 .  Please excuse her from 12/11/2024-12/15/2024.    Please excuse her from any classes or work missed.    If you have any questions or concerns, please don't hesitate to call.    Sincerely,         Ian Hudson MD

## 2024-12-12 NOTE — PROGRESS NOTES
"Chief Complaint:  No chief complaint on file.      History of Present Illness:    History of Present Illness    Patient presents today for follow-up after a slip and fall injury. She slipped and fell on Monday around 11:30 AM, resulting in a head injury. She denies loss of consciousness but experienced mild confusion and blurry vision throughout the day following the fall. She had nausea for the first couple of days post-injury. A CT scan performed in the ER was reported as "okay." She emphasizes that she rarely experiences headaches, typically having fewer than five per year. She reports a persistent headache since the incident, which worsened upon returning to work. She experiences intermittent blurry vision throughout the day. She also notes difficulty walking due to pain and mentions knee pain, describing it as "hurting now, but it's okay." She reports neck pain following the incident, describing it as feeling similar to whiplash. The pain appears to be associated with a head movement that caused a sudden jerking motion of the neck. She was prescribed ondansetron in the ER for nausea. No pain medication was given in the ER for her headache.      ROS:  General: denies fever, denies chills, denies fatigue, denies weight gain, denies weight loss, denies loss of appetite  Eyes: denies vision changes, admits blurry vision, denies eye pain, denies eye discharge  ENT: denies ear pain, denies hearing loss, denies tinnitus, denies nasal congestion, denies sore throat  Cardiovascular: denies chest pain, denies palpitations, denies lower extremity edema  Respiratory: denies cough, denies shortness of breath, denies wheezing, denies sputum production  Endocrine: denies polyuria, denies polydipsia, denies heat intolerance, denies cold intolerance  Gastrointestinal: denies abdominal pain, denies heartburn, admits nausea, denies vomiting, denies diarrhea, denies constipation, denies blood in stool  Genitourinary: denies " dysuria, denies urgency, denies frequency, denies hematuria, denies nocturia, denies incontinence  Heme & Lymphatic: denies easy or excessive bleeding, denies easy bruising, denies swollen lymph nodes  Musculoskeletal: denies muscle pain, denies back pain, admits joint pain, denies joint swelling, admits neck pain  Skin: denies rash, denies lesion, denies itching, denies skin texture changes, denies skin color changes  Neurological: admits headache, denies dizziness, denies numbness, denies tingling, denies seizure activity, denies speech difficulty, denies memory loss, denies confusion  Psychiatric: denies anxiety, denies depression, denies sleep difficulty           Past Medical History:   Diagnosis Date    Asthma     Hypertension affecting pregnancy in third trimester, antepartum 6/30/2016       Social History:  Social History     Socioeconomic History    Marital status: Single   Tobacco Use    Smoking status: Never    Smokeless tobacco: Never   Substance and Sexual Activity    Alcohol use: Yes     Comment: occ    Drug use: No    Sexual activity: Yes     Partners: Male     Birth control/protection: None     Social Drivers of Health     Financial Resource Strain: Medium Risk (2/21/2020)    Received from [facility] and Its Subsidiaries and Affiliates    Overall Financial Resource Strain (CARDIA)     Difficulty of Paying Living Expenses: Somewhat hard   Food Insecurity: Food Insecurity Present (2/21/2020)    Received from [facility] and Its Subsidiaries and Affiliates    Hunger Vital Sign     Worried About Running Out of Food in the Last Year: Often true     Ran Out of Food in the Last Year: Often true   Transportation Needs: No Transportation Needs (2/21/2020)    Received from [facility] and Its Subsidiaries and Affiliates    PRAPARE - Transportation     Lack of Transportation (Medical): No     Lack  of Transportation (Non-Medical): No   Stress: Stress Concern Present (2/21/2020)    Received from [facility] and Its Subsidiaries and Affiliates    [place] of [employer] [facility] - Occupational Stress Questionnaire     Feeling of Stress : To some extent       Family History:   family history includes Hypertension in her maternal grandmother; Stroke in her paternal grandmother.    Health Maintenance   Topic Date Due    Pneumococcal Vaccines (Age 0-64) (1 of 2 - PCV) Never done    Cervical Cancer Screening  11/20/2022    Influenza Vaccine (1) 09/01/2024    COVID-19 Vaccine (1 - 2024-25 season) Never done    TETANUS VACCINE  01/27/2031    RSV Vaccine (Age 60+ and Pregnant patients) (1 - 1-dose 75+ series) 10/26/2069    Hepatitis C Screening  Completed    HIV Screening  Completed    Lipid Panel  Completed       Exam:Physical     Vital Signs  Pulse: 83  SpO2: 99 %  BP: 132/80  Height and Weight  Weight: 69.7 kg (153 lb 8.8 oz)]    Body mass index is 25.55 kg/m².    Physical Exam    General: Well-developed. Well-nourished. No acute distress.  Eyes: EOMI. Sclerae anicteric.  HENT: Normocephalic. Atraumatic. Nares patent. Moist oral mucosa. Tenderness in occipital region.  Cardiovascular: Regular rate. Regular rhythm. No murmurs. No rubs. No gallops. Normal S1, S2.  Respiratory: Normal respiratory effort. Clear to auscultation bilaterally. No rales. No rhonchi. No wheezing.  Musculoskeletal: No  obvious deformity. Difficulty walking. Pain under knee on palpation.  Extremities: No lower extremity edema.  Neurological: Alert & oriented x3. No slurred speech. Normal gait. Normal finger-to-nose test. Normal upper extremity strength.  Psychiatric: Normal mood. Normal affect. Good insight. Good judgment.  Skin: Warm. Dry. No rash.  Neck: Neck tenderness. Limited range of motion in neck. Normal lateral neck rotation.           Assessment:        ICD-10-CM ICD-9-CM   1.  Post-concussion headache  G44.309 339.20   2. Neck pain  M54.2 723.1   3. Posterior left knee pain  M25.562 719.46         Plan:    Assessment & Plan    MEDICAL DECISION MAKING:  - Reviewed CT of head and neck, confirming no intracranial bleeding or cervical fracture  - Diagnosed post-concussion syndrome based on mechanism of injury and persistent symptoms  - Assessed for whiplash-type injury to neck due to fall mechanism  - Considered anti-inflammatory and pain management options for headache, neck pain, and knee pain  - Evaluated need for antiemetic medication due to persistent nausea    PATIENT EDUCATION:  - Explained mechanism of concussion, including brain movement within skull and resulting symptoms  - Discussed expected duration of post-concussion symptoms, noting they can persist for several weeks  - Clarified that concussion-related brain changes are not visible on CT    MEDICATIONS:  - Started medication for headache, neck pain, and knee pain, to be taken for at least 2 weeks  - Started Prednisone for 3 days to reduce inflammation  - Started Phenergan for nausea as needed    FOLLOW UP:  - Follow up with Dr. Wyatt next Friday as previously scheduled  - Contact the office if experiencing persistent dizziness before the scheduled follow-up visit         Diagnoses and all orders for this visit:    Post-concussion headache    Neck pain    Posterior left knee pain    Other orders  -     celecoxib (CELEBREX) 100 MG capsule; Take 1 capsule (100 mg total) by mouth once daily.  -     promethazine (PHENERGAN) 12.5 MG Tab; Take 1 tablet (12.5 mg total) by mouth every 6 (six) hours as needed.  -     predniSONE (DELTASONE) 50 MG Tab; Take 1 tablet (50 mg total) by mouth once daily. for 3 days        No follow-ups on file.      Ian Hudson MD

## 2025-02-04 ENCOUNTER — OFFICE VISIT (OUTPATIENT)
Dept: FAMILY MEDICINE | Facility: CLINIC | Age: 30
End: 2025-02-04
Payer: COMMERCIAL

## 2025-02-04 VITALS
HEIGHT: 65 IN | HEART RATE: 62 BPM | WEIGHT: 155 LBS | DIASTOLIC BLOOD PRESSURE: 68 MMHG | SYSTOLIC BLOOD PRESSURE: 122 MMHG | RESPIRATION RATE: 16 BRPM | BODY MASS INDEX: 25.83 KG/M2

## 2025-02-04 DIAGNOSIS — G44.309 POST-CONCUSSION HEADACHE: Primary | ICD-10-CM

## 2025-02-04 DIAGNOSIS — J45.30 MILD PERSISTENT ASTHMA WITHOUT COMPLICATION: ICD-10-CM

## 2025-02-04 PROCEDURE — 99999 PR PBB SHADOW E&M-EST. PATIENT-LVL V: CPT | Mod: PBBFAC,,, | Performed by: STUDENT IN AN ORGANIZED HEALTH CARE EDUCATION/TRAINING PROGRAM

## 2025-02-04 PROCEDURE — 3078F DIAST BP <80 MM HG: CPT | Mod: CPTII,S$GLB,, | Performed by: STUDENT IN AN ORGANIZED HEALTH CARE EDUCATION/TRAINING PROGRAM

## 2025-02-04 PROCEDURE — G2211 COMPLEX E/M VISIT ADD ON: HCPCS | Mod: S$GLB,,, | Performed by: STUDENT IN AN ORGANIZED HEALTH CARE EDUCATION/TRAINING PROGRAM

## 2025-02-04 PROCEDURE — 3008F BODY MASS INDEX DOCD: CPT | Mod: CPTII,S$GLB,, | Performed by: STUDENT IN AN ORGANIZED HEALTH CARE EDUCATION/TRAINING PROGRAM

## 2025-02-04 PROCEDURE — 3074F SYST BP LT 130 MM HG: CPT | Mod: CPTII,S$GLB,, | Performed by: STUDENT IN AN ORGANIZED HEALTH CARE EDUCATION/TRAINING PROGRAM

## 2025-02-04 PROCEDURE — 1159F MED LIST DOCD IN RCRD: CPT | Mod: CPTII,S$GLB,, | Performed by: STUDENT IN AN ORGANIZED HEALTH CARE EDUCATION/TRAINING PROGRAM

## 2025-02-04 PROCEDURE — 99214 OFFICE O/P EST MOD 30 MIN: CPT | Mod: S$GLB,,, | Performed by: STUDENT IN AN ORGANIZED HEALTH CARE EDUCATION/TRAINING PROGRAM

## 2025-02-04 RX ORDER — RIZATRIPTAN BENZOATE 5 MG/1
5 TABLET, ORALLY DISINTEGRATING ORAL
Qty: 30 TABLET | Refills: 0 | Status: SHIPPED | OUTPATIENT
Start: 2025-02-04 | End: 2025-03-06

## 2025-02-04 RX ORDER — ALBUTEROL SULFATE 90 UG/1
1 INHALANT RESPIRATORY (INHALATION) EVERY 6 HOURS PRN
Qty: 18 G | Refills: 0 | Status: SHIPPED | OUTPATIENT
Start: 2025-02-04

## 2025-02-04 RX ORDER — AMITRIPTYLINE HYDROCHLORIDE 10 MG/1
TABLET, FILM COATED ORAL
Qty: 21 TABLET | Refills: 0 | Status: SHIPPED | OUTPATIENT
Start: 2025-02-04 | End: 2025-02-21

## 2025-02-04 NOTE — PROGRESS NOTES
Patient ID: Marcella Duke is a 29 y.o. female.    Chief Complaint: Back Pain (Mid to lower back pain) and Headache (Frequent headaches- almost daily)    History of Present Illness    CHIEF COMPLAINT:  Ms. Duke presents today for persistent headaches following a fall.    HISTORY OF PRESENT ILLNESS:  She experienced a head injury with brief loss of consciousness lasting a few seconds following a slip and fall. Since then, she has been experiencing persistent headaches that begin in the occipital region and spread to the temples, progressively worsening over hours. She reports visual symptoms requiring blinking for vision to return to focus. Previously prescribed naproxen has not provided relief. She denies weakness, numbness, nausea, or vomiting.    SLEEP:  She reports difficulty initiating sleep, recently experiencing an episode where she was awake from 9 PM to 2 AM. She requires a beverage to initiate sleep.    RESPIRATORY:  She has a history of childhood asthma. She experienced severe shortness of breath requiring an ER visit in October where she received nebulizer treatment. Her current Albuterol inhaler and Anoro ellipta is reported as ineffective in managing symptoms.    SOCIAL HISTORY:  She has quit smoking and alcohol.      ROS:  General: -fever, -chills, -fatigue, -weight gain, -weight loss  Eyes: -vision changes, -redness, -discharge  ENT: -ear pain, -nasal congestion, -sore throat  Cardiovascular: -chest pain, -palpitations, -lower extremity edema  Respiratory: -cough, +shortness of breath  Gastrointestinal: -abdominal pain, -nausea, -vomiting, -diarrhea, -constipation, -blood in stool  Genitourinary: -dysuria, -hematuria, -frequency  Musculoskeletal: -joint pain, -muscle pain  Skin: -rash, -lesion  Neurological: +headache, -dizziness, -numbness, -tingling, -weakness  Psychiatric: -anxiety, -depression, +sleep difficulty         Pmh, Psh, Family Hx, Social Hx updated in Epic Tabs today.        2025     4:18 PM 2024    10:19 AM   Depression Patient Health Questionnaire   Over the last two weeks how often have you been bothered by little interest or pleasure in doing things Not at all Not at all   Over the last two weeks how often have you been bothered by feeling down, depressed or hopeless Not at all Not at all   PHQ-2 Total Score 0 0       Active Problem List with Overview Notes    Diagnosis Date Noted    Cigarette nicotine dependence without complication 2024    Alcohol dependence 2020    Cannabis dependence 2020    SSRI overdose, intentional self-harm, initial encounter 2020     History of suicidal attempt with SSRI overdose in  for which she was hospitalized for 2 weeks.        History of high blood pressure 2019     Last Pregnancy, baby ASA dailt      Asthma 2015     Inhaler PRN         Past Medical History:   Diagnosis Date    Asthma     Hypertension affecting pregnancy in third trimester, antepartum 2016       Past Surgical History:   Procedure Laterality Date    none         Family History   Problem Relation Name Age of Onset    Stroke Paternal Grandmother      Hypertension Maternal Grandmother      Breast cancer Neg Hx      Cancer Neg Hx      Colon cancer Neg Hx      Diabetes Neg Hx      Eclampsia Neg Hx      Miscarriages / Stillbirths Neg Hx      Ovarian cancer Neg Hx       labor Neg Hx         Social History     Socioeconomic History    Marital status: Single   Tobacco Use    Smoking status: Never    Smokeless tobacco: Never   Substance and Sexual Activity    Alcohol use: Yes     Comment: occ    Drug use: No    Sexual activity: Yes     Partners: Male     Birth control/protection: None     Social Drivers of Health     Financial Resource Strain: Medium Risk (2020)    [facility] Its Subsidiaries and Affiliates    Overall Financial Resource Strain (CARDIA)     Difficulty of  Paying Living Expenses: Somewhat hard   Food Insecurity: Food Insecurity Present (2/21/2020)    Received from [facility] and Its Subsidiaries and Affiliates    Hunger Vital Sign     Worried About Running Out of Food in the Last Year: Often true     Ran Out of Food in the Last Year: Often true   Transportation Needs: No Transportation Needs (2/21/2020)    Received from [facility] and Its Subsidiaries and Affiliates    PRAPARE - Transportation     Lack of Transportation (Medical): No     Lack of Transportation (Non-Medical): No   Stress: Stress Concern Present (2/21/2020)    Received from [facility] and Its Subsidiaries and Affiliates    [place] of Occupational Health - Occupational Stress Questionnaire     Feeling of Stress : To some extent       Current Outpatient Medications on File Prior to Visit   Medication Sig Dispense Refill    celecoxib (CELEBREX) 100 MG capsule Take 1 capsule (100 mg total) by mouth once daily. 30 capsule 1    olopatadine (PATANOL) 0.1 % ophthalmic solution Place 1 drop into both eyes every evening. 5 mL 0    promethazine (PHENERGAN) 12.5 MG Tab Take 1 tablet (12.5 mg total) by mouth every 6 (six) hours as needed. 20 tablet 1    umeclidinium-vilanteroL (ANORO ELLIPTA) 62.5-25 mcg/actuation DsDv Inhale 1 puff into the lungs once daily. Controller 60 each 6    [DISCONTINUED] albuterol (VENTOLIN HFA) 90 mcg/actuation inhaler Inhale 1 puff into the lungs every 6 (six) hours as needed for Wheezing. Rescue 18 g 0    [DISCONTINUED] fluticasone propionate (FLOVENT HFA) 110 mcg/actuation inhaler Inhale 1 puff into the lungs 2 (two) times daily. Controller 12 g 3    [DISCONTINUED] tiotropium-olodateroL (STIOLTO RESPIMAT) 2.5-2.5 mcg/actuation Mist 2 puffs once a day 4 g 3     No current facility-administered medications on file prior to visit.       Review of patient's  allergies indicates:   Allergen Reactions    Apple Other (See Comments)     Makes mouth tingle    Banana Other (See Comments)     Makes mouth tingle         Review of Systems    General - Well developed, alert and oriented in NAD  HEENT - normocephalic, no evidence of trauma, sclera white, EOMI  Neck - full range of motion, mild paraspinal tenderness present in the cervical region  COR - regular rate and rhythm without murmurs or gallops  Lungs - Clear  Abdomen - soft, non-tender  Ext - no cyanosis or edema    Physical Exam     Assessment:     1. Post-concussion headache    2. Mild persistent asthma without complication        LABS:   Lab Results   Component Value Date    HGBA1C 4.9 04/25/2024      Lab Results   Component Value Date    CHOL 150 04/25/2024     Lab Results   Component Value Date    LDLCALC 56.8 (L) 04/25/2024     Lab Results   Component Value Date    WBC 4.49 10/09/2024    HGB 15.2 10/09/2024    HCT 43.1 10/09/2024     10/09/2024    CHOL 150 04/25/2024    TRIG 81 04/25/2024    HDL 77 (H) 04/25/2024    ALT 17 10/09/2024    AST 32 10/09/2024     10/09/2024    K 3.7 10/09/2024     (H) 10/09/2024    CREATININE 0.8 10/09/2024    BUN 10 10/09/2024    CO2 18 (L) 10/09/2024    TSH 0.577 04/25/2024    HGBA1C 4.9 04/25/2024       Plan:   Marcella was seen today for back pain and headache.    Diagnoses and all orders for this visit:    Post-concussion headache  -     MRI Brain Without Contrast; Future  -     rizatriptan (MAXALT-MLT) 5 MG disintegrating tablet; Take 1 tablet (5 mg total) by mouth as needed for Migraine. May repeat in 2 hours if needed  -     amitriptyline (ELAVIL) 10 MG tablet; Take 1 tablet (10 mg total) by mouth nightly as needed for Insomnia for 7 days, THEN 2 tablets (20 mg total) nightly as needed for Insomnia.  -     Ambulatory referral/consult to Physical/Occupational Therapy; Future    Mild persistent asthma without complication  -     albuterol (VENTOLIN HFA) 90  mcg/actuation inhaler; Inhale 1 puff into the lungs every 6 (six) hours as needed for Wheezing. Rescue  -     Ambulatory referral/consult to Pulmonology; Future  -     Complete PFT w/ bronchodilator; Future        Assessment & Plan    IMPRESSION:  - Assessed persistent headaches as post-concussion syndrome, considering duration and lack of response to initial treatment  - Determined need for MRI brain to rule out other etiologies, given prolonged symptoms and mild loss of consciousness during fall  - Considered migraine-like features and decided to trial Maxalt (rizatriptan) for acute treatment  - Evaluated sleep issues and opted to initiate low-dose amitriptyline for dual benefit of headache prevention and sleep improvement  - Noted possible asthma exacerbation history; EKG showed possible left atrial enlargement and pulmonary disease pattern    POST-CONCUSSION HEADACHE:  - Explained that post-concussion headaches can persist for weeks to months.  - Noted that the patient has had daily headaches since a fall where they hit their head on the floor.  - Observed that the headaches are located in the back of the head and spread to the whole head and temples.  - Assessed the patient's condition as post-concussion headache, noting that it has been more than 2 months since the initial injury.  - Reviewed the previous CT scan results, which were normal.  - Considered referral to a neurologist if headaches persist after current interventions.  - Prescribed Maxalt (rizatriptan) 5mg as needed for severe headaches, with instructions not to take more than 2 tablets.  - Prescribed amitriptyline starting at 10mg nightly, with instructions to increase by 10mg weekly up to 50mg for headache and sleep management.  - Recommend lifestyle modifications including limiting screen time, staying hydrated, and using heat and ice therapy.  - Referred the patient to physical therapy for neck pain and headache management.  - Discussed that  Maxalt (rizatriptan) is used for acute headache treatment.  - Prescribed Maxalt (rizatriptan) 5mg as needed for acute headache, maximum 2 tablets per day, may repeat after 2 hours if needed.  - Initiated amitriptyline 10mg at bedtime, with instructions to increase by 10mg weekly up to 50mg as tolerated.  - Referred the patient to physical therapy for headache management.  - Scheduled follow up in 2 weeks for headache management and medication effectiveness evaluation.  - Noted that the patient reports daily headaches since a fall, located in the back of the head and spreading to the whole head and temples.  - Assessed the condition as post-concussion headache, noting that it has persisted for more than 2 months.  - Recommend staying hydrated.  - Ms. Profit to reduce loud noise exposure.  - Recommend practicing relaxation techniques and breathing exercises.    NECK PAIN:  - Educated the patient on the use of heat and ice therapy for neck pain.  - Instructed the patient to apply alternating heat and ice therapy to the neck area.  - Referred the patient to physical therapy for neck pain management.  - Noted that the patient reports pain in the neck area.  - Examined the patient's neck and identified tenderness in the middle area.    ASTHMA:  - Informed the patient about the lung function test procedure to determine asthma status.  - Continued albuterol inhaler as needed for shortness of breath.  - Discontinued Anoro Ellipta due to lack of efficacy.  - Referred the patient to pulmonology for evaluation of shortness of breath and possible asthma.  - Noted that the patient reports a history of asthma since childhood and a recent severe episode of shortness of breath that required an ER visit.  - Reviewed previous ER visit records, noting normal sinus rhythm, possible left retinal enlargement, and pulmonary disease pattern on EKG.  - Ordered a lung function test to confirm asthma diagnos  is and assess severity.  -  Refilled Albuterol inhaler for as-needed use.    VISUAL DISTURBANCES:  - Noted that the patient experiences blurry vision and shaking sensation, requiring them to blink to refocus.  - Instructed the patient to limit screen time, especially at night.  - Noted that the patient reports experiencing blurry vision and shaking sensation, requiring them to blink to refocus.    LABS:  - Ordered an MRI of the brain to rule out any other issues.  - Ordered an MRI of the brain to investigate visual disturbances.    OTHER INSTRUCTIONS:  - Noted that the patient reports no current cigarette smoking.  - Prescribed amitriptyline, which can help with mood, sleep, and pain management.           Face to Face time with patient:  4:40 PM CST -      Each patient to whom he or she provides medical services by telemedicine is:  (1) informed of the relationship between the physician and patient and the respective role of any other health care provider with respect to management of the patient; and (2) notified that he or she may decline to receive medical services by telemedicine and may withdraw from such care at any time.    I spent a total of  32     minutes face to face and non-face to face on the date of this visit.This includes time preparing to see the patient (eg, review of tests, notes), obtaining and/or reviewing additional history from an independent historian and/or outside medical records, documenting clinical information in the electronic health record, independently interpreting results and/or communicating results to the patient/family/caregiver, or care coordinator.  Visit today included increased complexity associated with the care of the episodic problem addressed and managing the longitudinal care of the patient due to the serious and/or complex managed problem(s).    There are no Patient Instructions on file for this visit.    No follow-ups on file.  The ASCVD Risk score (Windom DK, et al., 2019) failed to calculate  for the following reasons:    The 2019 ASCVD risk score is only valid for ages 40 to 79    This note was generated with the assistance of ambient listening technology. Verbal consent was obtained by the patient and accompanying visitor(s) for the recording of patient appointment to facilitate this note. I attest to having reviewed and edited the generated note for accuracy, though some syntax or spelling errors may persist. Please contact the author of this note for any clarification.

## 2025-02-06 ENCOUNTER — PATIENT MESSAGE (OUTPATIENT)
Dept: PULMONOLOGY | Facility: CLINIC | Age: 30
End: 2025-02-06
Payer: COMMERCIAL

## 2025-02-10 ENCOUNTER — TELEPHONE (OUTPATIENT)
Dept: FAMILY MEDICINE | Facility: CLINIC | Age: 30
End: 2025-02-10
Payer: COMMERCIAL

## 2025-02-18 ENCOUNTER — HOSPITAL ENCOUNTER (OUTPATIENT)
Dept: RADIOLOGY | Facility: HOSPITAL | Age: 30
Discharge: HOME | End: 2025-02-18
Attending: STUDENT IN AN ORGANIZED HEALTH CARE EDUCATION/TRAINING PROGRAM
Payer: COMMERCIAL

## 2025-02-18 ENCOUNTER — RESULTS FOLLOW-UP (OUTPATIENT)
Dept: FAMILY MEDICINE | Facility: CLINIC | Age: 30
End: 2025-02-18

## 2025-02-18 DIAGNOSIS — G44.309 POST-CONCUSSION HEADACHE: ICD-10-CM

## 2025-02-18 PROCEDURE — 70551 MRI BRAIN STEM W/O DYE: CPT | Mod: TC,PN

## 2025-02-21 ENCOUNTER — CLINICAL SUPPORT (OUTPATIENT)
Dept: REHABILITATION | Facility: HOSPITAL | Age: 30
End: 2025-02-21
Payer: COMMERCIAL

## 2025-02-21 ENCOUNTER — OFFICE VISIT (OUTPATIENT)
Dept: FAMILY MEDICINE | Facility: CLINIC | Age: 30
End: 2025-02-21
Payer: COMMERCIAL

## 2025-02-21 VITALS
SYSTOLIC BLOOD PRESSURE: 130 MMHG | OXYGEN SATURATION: 99 % | HEART RATE: 87 BPM | TEMPERATURE: 98 F | BODY MASS INDEX: 26.5 KG/M2 | WEIGHT: 159.06 LBS | DIASTOLIC BLOOD PRESSURE: 82 MMHG | HEIGHT: 65 IN

## 2025-02-21 DIAGNOSIS — G44.309 POST-CONCUSSION HEADACHE: ICD-10-CM

## 2025-02-21 DIAGNOSIS — M54.2 NECK PAIN: ICD-10-CM

## 2025-02-21 DIAGNOSIS — G44.309 POST-CONCUSSION HEADACHE: Primary | ICD-10-CM

## 2025-02-21 PROCEDURE — 97162 PT EVAL MOD COMPLEX 30 MIN: CPT

## 2025-02-21 PROCEDURE — 97112 NEUROMUSCULAR REEDUCATION: CPT

## 2025-02-21 NOTE — PROGRESS NOTES
Outpatient Rehab    Physical Therapy Evaluation    Patient Name: Marcella Duke  MRN: [id number]  YOB: 1995  Today's Date: 2/21/2025    Therapy Diagnosis:   Encounter Diagnosis   Name Primary?    Post-concussion headache      Physician: Elisa Wyatt MD    Physician Orders: Eval and Treat  Medical Diagnosis: Post-concussion headache     Visit # / Visits Authorized:  1 / 1   Date of Evaluation:  2/21/2025   Insurance Authorization Period: 2/4/2025 - 12/31/2025   Plan of Care Certification:  2/21/2025 to 04/18/2025      Time In: 0745   Time Out: 0833  Total Time: 48   Total Billable Time: 48    Intake Outcome Measure for FOTO Survey    Therapist reviewed FOTO scores for Marcella Duke on 2/21/2025.   FOTO report - see Media section or FOTO account episode details.     Intake Score: 28%     Subjective   History of Present Illness  Marcella is a 29 y.o. female  According to the patient's chart, Marcella has a past medical history of Asthma and Hypertension affecting pregnancy in third trimester, antepartum. Marcella has a past surgical history that includes none.                History of Present Condition/Illness: Patient reports she had a fall on December 9th, 2024. Patient notes she was walking into a restaurant and slipped and fell backwards hitting her head. States she lost consciousness for about a second. Went to ED for scans. Cleared but was having vomitting and headaches afterwards. Headaches continued with multiple a day lasting 30-60 minutes. Patient notes headaches are central and throbbing. Doenst feel that positions or movements affect onset or help to reduce. Notes symptoms of diplopia at times and feels her memory is not as good. Notes some symptoms of tingling on scalp but mostly throbbing. Also has neck and low back pain following fall. Prior to accident was doing 4-5 days a week of exercise with walking, squats, and weights.     Pain     Patient reports a current pain level of 8/10.  Pain at best is reported as 8/10. Pain at worst is reported as 10/10.   Location: upper cervical  Clinical Progression (since onset): Worsening  Pain Qualities: Throbbing, Aching, Tenderness, Burning  Pain-Relieving Factors: Heat  Pain-Aggravating Factors: Other (Comment)  Other Pain-Aggravating Factors: photophobia         Review of Systems  Patient reports: Dizziness        Treatment History  Treatments  Discharged From Past 30 Days: Other (Comment)  Discharged From Other Facility Past 30 Days: chiropractor - 2-3x per week - doing TENS, massage, manipulations    Employment  Patient reports: Does the patient's condition impact their ability to work?  Employment Status: Employed full-time   Patient delivers mail. Able to complete her work with increased pain and increased rest breaks.       Past Medical History/Physical Systems Review:   Marcella Duke  has a past medical history of Asthma and Hypertension affecting pregnancy in third trimester, antepartum.    Marcella Duke  has a past surgical history that includes none.    Marcella has a current medication list which includes the following prescription(s): albuterol, amitriptyline, celecoxib, olopatadine, promethazine, rizatriptan, and umeclidinium-vilanterol.    Review of patient's allergies indicates:   Allergen Reactions    Apple Other (See Comments)     Makes mouth tingle    Banana Other (See Comments)     Makes mouth tingle        Objective   Posture  Patient presents with a Forward head position.     Shoulders are Rounded.             Cervical Thoracic Sensation            Paresthesias at times on C2 distribution.         Spinal Mobility  Hypermobile: Cervical and Thoracic           Subcranial Range of Motion   Active Restricted? Passive Restricted? Pain   Flexion         Protraction         Retraction           Cervical Range of Motion   Active (deg) Passive (deg) Pain   Flexion 50 (pain)       Extension 45       Right Lateral Flexion 40       Right  Rotation 80       Left Lateral Flexion 28       Left Rotation 65              Shoulder Range of Motion     Shoulder, Elbow, or Forearm Range of Motion Details: Within normal limits.          Shoulder Strength - Planes of Motion   Right Strength Right Pain Left Strength Left  Pain   Flexion 4   4     Extension 4   4     ABduction 4   4     ADduction           Horizontal ABduction           Horizontal ADduction           Internal Rotation 0°           Internal Rotation 90°           External Rotation 0° 4   4     External Rotation 90°                              Cervical Screen  Tests  Negative: Right Spurling's A, Left Spurling's A, Right Spurling's B, Left Spurling's B, Right Distraction, and Left Distraction  Cervical Range of Motion           Thoracic Range of Motion             Cervical/Thoracic Special Tests            Cervical Tests  Negative: Right Distraction, Left Distraction, Right Spurling's A, and Left Spurling's A  Negative: Right Spurling's B and Left Spurling's B                  Treatment:  CPT Intervention Performed   Today Duration / Intensity   MT                  TE                                NMR Home exercise plan  x Demonstration, education, performance    Rotational C1-C2 Snag      Cervical AROM  All planes    Scapular Retractions                   TA                                                       PLAN Trap stretch, pec stretch, levator stretch, chin tucks, rows       CPT Codes available for Billing:   (00) minutes of Manual therapy (MT) to improve pain and ROM.  (00) minutes of Therapeutic Exercise (TE) to develop strength, endurance, range of motion, and flexibility.  (15) minutes of Neuromuscular Re-Education (NMR)  to improve: Balance, Coordination, Kinesthetic, Sense, Proprioception, and Posture.  (00) minutes of Therapeutic Activities (TA) to improve functional performance.  Vasopneumatic Device Therapy () for management of swelling/edema. (12105)  Unattended Electrical  Stimulation (ES) for muscle performance or pain modulation.  BFR: Blood flow restriction applied during exercise    Assessment & Plan   Assessment  Marcella presents with a condition of Moderate complexity.   Presentation of Symptoms: Evolving  Will Comorbidities Impact Care: Yes  Past Medical History: No date: Asthma 6/30/2016: Hypertension affecting pregnancy in third trimester,  antepartum     Functional Limitations: Activity tolerance, Disrupted sleep pattern, Functional mobility, Range of motion, Pain with ADLs/IADLs, Participating in leisure activities  Impairments: Abnormal muscle firing, Abnormal muscle tone, Abnormal or restricted range of motion, Activity intolerance, Impaired physical strength, Pain with functional activity, Lack of appropriate home exercise program  Personal Factors Affecting Prognosis: Pain    Patient Goal for Therapy (PT): Pt reported goals are to decrease overall pain levels in order to return to prior functional level.  Prognosis: Good  Prognosis Details: Anticipated Barriers for therapy: co-morbidities, chronicity of condition, adherence to treatment plan, and occupation   Assessment Details: Pt presents with impairments in the following categories: IMPAIRMENTS: ROM, strength, joint mobility, muscle length, and posture.  Pt will benefit from skilled outpatient Physical Therapy to address the deficits stated above, provide pt/family education, and to maximize pt's level of independence.     Plan  From a physical therapy perspective, the patient would benefit from: Skilled Rehab Services    Planned therapy interventions include: Therapeutic exercise, Therapeutic activities, Neuromuscular re-education, Manual therapy, and Aquatic therapy.    Planned modalities to include: Cryotherapy (cold pack), Electrical stimulation - attended, Electrical stimulation - passive/unattended, Mechanical traction, and Thermotherapy (hot pack).        Visit Frequency: 2 times Per Week for 8 Weeks.       This  plan was discussed with Patient.   Discussion participants: Agreed Upon Plan of Care  Plan details: Outpatient Physical Therapy to include any combination of the following interventions: virtual visits, dry needling, modalities, electrical stimulation (IFC, Pre-Mod, Attended with Functional Dry Needling), Aquatic Therapy, Cervical/Lumbar Traction, Electrical Stimulation, Gait Training, Manual Therapy, Moist Heat/ Ice, Neuromuscular Re-ed, Patient Education, Self Care, Therapeutic Exercise, and Therapeutic Activites           Patient's spiritual, cultural, and educational needs considered and patient agreeable to plan of care and goals.     Education  Education was done with Patient. The patient's learning style includes Demonstration and Listening. The patient Demonstrates understanding and Verbalizes understanding.         PURPOSE: Patient educated on the impairments noted above and the effects of physical therapy intervention to improve overall condition and QOL.  EXERCISE: Patient was educated on all the above exercise prior/during/after for proper posture, positioning, and execution for safe performance with home exercise program.  STRENGTH: Patient educated on the importance of improved core and extremity strength in order to improve alignment of the spine and extremities with static positions and dynamic movement.  POSTURE: Patient educated on postural awareness to reduce stress and maintain optimal alignment of the spine with static positions and dynamic movement  SLEEPING POSITIONS: Patient educated on the use of pillows to aid in neutral alignment of spine and extremities when sleeping in supine or side lying. ERGONOMICS: Patient educated on proper ergonomics at the work station in order to maintain optimal alignment of the musculoskeletal system and improve efficiency in the work environment. POLICIES: Educated patient on scheduling, cancelation and no-show policy.        Goals:   Active       Functional  outcome       Patient will show a significant change in FOTO patient-reported outcome tool to goal score to demonstrate subjective improvement       Start:  02/21/25    Expected End:  04/18/25            Patient will demonstrate independence in home program for support of progression       Start:  02/21/25    Expected End:  03/21/25               Pain       Patient will report pain of 4/10 demonstrating a reduction of overall pain       Start:  02/21/25    Expected End:  04/18/25            Patient will report a 2 point reduction in pain.       Start:  02/21/25    Expected End:  03/21/25               Range of Motion       Patient will achieve bilateral cervical side bending ROM 75 degrees       Start:  02/21/25    Expected End:  04/18/25            Patient will achieve bilateral cervical rotation ROM 35 degrees       Start:  02/21/25    Expected End:  04/18/25               Strength       Patient will achieve bilateral shoulder flexion strength of 4+/5       Start:  02/21/25    Expected End:  04/18/25            Patient will achieve bilateral shoulder abduction strength of 4+/5       Start:  02/21/25    Expected End:  04/18/25                Oli Sexton, PT

## 2025-02-21 NOTE — PROGRESS NOTES
Patient ID: Marcella Duke is a 29 y.o. female.    Chief Complaint: Follow-up    History of Present Illness    CHIEF COMPLAINT:  Ms. Duke presents today for persistent headaches following a slip and fall on 12/9/24    POST-CONCUSSION SYNDROME:  She experiences headaches twice daily that begin anteriorly and progress to midway of the head, lasting approximately one hour. Associated symptoms include occasional nausea and vomiting, vision changes requiring blinking to focus, and photosensitivity particularly with bright sunlight. MRI of head was normal.    CURRENT TREATMENTS:  She takes Maxalt for acute symptom management with limited effectiveness and occasional ibuprofen, being mindful to avoid overuse due to rebound headache risk. She attends physical therapy 2-3 times weekly and receives chiropractic treatments.      ROS:  General: -fever, -chills, -fatigue, -weight gain, -weight loss  Eyes: +vision changes, -redness, -discharge  ENT: -ear pain, -nasal congestion, -sore throat  Cardiovascular: -chest pain, -palpitations, -lower extremity edema  Respiratory: -cough, -shortness of breath  Gastrointestinal: -abdominal pain, +nausea, +vomiting, -diarrhea, -constipation, -blood in stool  Genitourinary: -dysuria, -hematuria, -frequency  Musculoskeletal: -joint pain, -muscle pain  Skin: -rash, -lesion  Neurological: +headache, -dizziness, -numbness, -tingling  Psychiatric: -anxiety, -depression, -sleep difficulty         Pmh, Psh, Family Hx, Social Hx updated in Epic Tabs today.       2/4/2025     4:18 PM 12/12/2024    10:19 AM   Depression Patient Health Questionnaire   Over the last two weeks how often have you been bothered by little interest or pleasure in doing things Not at all Not at all   Over the last two weeks how often have you been bothered by feeling down, depressed or hopeless Not at all Not at all   PHQ-2 Total Score 0 0       Active Problem List with Overview Notes    Diagnosis Date Noted     Cigarette nicotine dependence without complication 2024    Alcohol dependence 2020    Cannabis dependence 2020    SSRI overdose, intentional self-harm, initial encounter 2020     History of suicidal attempt with SSRI overdose in  for which she was hospitalized for 2 weeks.        History of high blood pressure 2019     Last Pregnancy, baby ASA dailt      Asthma 2015     Inhaler PRN         Past Medical History:   Diagnosis Date    Asthma     Hypertension affecting pregnancy in third trimester, antepartum 2016       Past Surgical History:   Procedure Laterality Date    none         Family History   Problem Relation Name Age of Onset    Stroke Paternal Grandmother      Hypertension Maternal Grandmother      Breast cancer Neg Hx      Cancer Neg Hx      Colon cancer Neg Hx      Diabetes Neg Hx      Eclampsia Neg Hx      Miscarriages / Stillbirths Neg Hx      Ovarian cancer Neg Hx       labor Neg Hx         Social History     Socioeconomic History    Marital status: Single   Tobacco Use    Smoking status: Never    Smokeless tobacco: Never   Substance and Sexual Activity    Alcohol use: Yes     Comment: occ    Drug use: No    Sexual activity: Yes     Partners: Male     Birth control/protection: None     Social Drivers of Health     Financial Resource Strain: Medium Risk (2020)    Received from [facility] and Its Subsidiaries and Affiliates    Overall Financial Resource Strain (CARDIA)     Difficulty of Paying Living Expenses: Somewhat hard   Food Insecurity: Food Insecurity Present (2020)    Received from [facility] and Its Subsidiaries and Affiliates    Hunger Vital Sign     Worried About Running Out of Food in the Last Year: Often true     Ran Out of Food in the Last Year: Often true   Transportation Needs: No Transportation Needs (2020)    [facility] and Its Subsidiaries and Affiliates    PRAPARE - Transportation     Lack of Transportation (Medical): No     Lack of Transportation (Non-Medical): No   Stress: Stress Concern Present (2/21/2020)    [facility]    [facility] of Occupational Health - Occupational Stress Questionnaire     Feeling of Stress : To some extent       Medications Ordered Prior to Encounter[1]    Review of patient's allergies indicates:   Allergen Reactions    Apple Other (See Comments)     Makes mouth tingle    Banana Other (See Comments)     Makes mouth tingle         Review of Systems    General - Well developed, alert and oriented in NAD  HEENT - normocephalic, no evidence of trauma, sclera white, EOMI  Neck - full range of motion  COR - regular rate and rhythm without murmurs or gallops  Lungs - Clear  Abdomen - soft, non-tender  Ext - no cyanosis or edema    Physical Exam     Assessment:     1. Post-concussion headache    2. Neck pain        LABS:   Lab Results   Component Value Date    HGBA1C 4.9 04/25/2024      Lab Results   Component Value Date    CHOL 150 04/25/2024     Lab Results   Component Value Date    LDLCALC 56.8 (L) 04/25/2024     Lab Results   Component Value Date    WBC 4.49 10/09/2024    HGB 15.2 10/09/2024    HCT 43.1 10/09/2024     10/09/2024    CHOL 150 04/25/2024    TRIG 81 04/25/2024    HDL 77 (H) 04/25/2024    ALT 17 10/09/2024    AST 32 10/09/2024     10/09/2024    K 3.7 10/09/2024     (H) 10/09/2024    CREATININE 0.8 10/09/2024    BUN 10 10/09/2024    CO2 18 (L) 10/09/2024    TSH 0.577 04/25/2024    HGBA1C 4.9 04/25/2024       Plan:   Marcella was seen today for follow-up.    Diagnoses and all orders for this visit:    Post-concussion headache  -     Ambulatory referral/consult to Neurology; Future    Neck pain        Assessment & Plan    IMPRESSION:  - Assessed headache as  post-concussion, persisting daily despite normal MRI results  - Evaluated Maxalt effectiveness as limited for symptom relief  - Determined physical therapy favored over chiropractic treatments for safer neck manipulation  - Determined neurological referral necessary if headaches continue    POST-CONCUSSION HEADACHE:  - Assessed the patient's condition as post-concussion headache, expecting improvement over time but potentially taking several weeks to months.  - Noted persistent daily headaches occurring 1-2 times daily, lasting about an hour each, starting after a fall and ongoing for several weeks.  - Observed throbbing headaches starting from the front and extending midway, with occasional nausea and vomiting.  - Explained post-concussion headaches can take weeks to months to resolve.  - Prescribed Maxalt for headache relief, which provided temporary relief.  - Discontinued daily ibuprofen use due to rebound headache risk.  - Explained rebound headache risk from frequent ibuprofen use.  - Advised limiting ibuprofen use to avoid rebound headaches.    VISUAL DISTURBANCES:  - Noted visual disturbances, including difficulty focusing and photosensitivity.  - Noted patient reports of difficulty focusing and needing to blink repeatedly to clear vision.  - Observed patient experiences photosensitivity, especially to sunlight.  - Recommend reduction in screen time and avoidance of bright lights as part of post-concussion management.    NAUSEA AND VOMITING:  - Noted patient reports of experiencing nausea and vomiting 1-2 times in association with headaches.    PHYSICAL THERAPY:  - Evaluated physical therapist assessment showing left side weakness compared to the right side.  - Recommend continuation of physical therapy, reduction in screen time, and breathing exercises.  - Discussed potential risks of chiropractic neck manipulation compared to physical therapy.  - Recommend continuation of physical therapy over chiropractic  treatment.  - Ms. Duke to continue physical therapy exercises for neck.  - Ms. Duke to maintain proper sleep positioning as advised by physical therapist.    NEUROLOGY REFERRAL:  - Referred the patient to neurology for further evaluation and management of persistent headaches.    LABS:  - Reviewed normal MRI results, ruling out other serious conditions.    OTHER INSTRUCTIONS:  - Recommend reducing screen time.  - Ms. Duke to practice breathing exercises.  - Recommend decreasing exposure to bright lights and loud noises.    FOLLOW UP:  - Scheduled follow-up visit in 3 months for annual wellness visit.  - Instructed the patient to contact the office if headache symptoms worsen or new issues arise before next appointment.           Face to Face time with patient:  9:20 AM CST -      Each patient to whom he or she provides medical services by telemedicine is:  (1) informed of the relationship between the physician and patient and the respective role of any other health care provider with respect to management of the patient; and (2) notified that he or she may decline to receive medical services by telemedicine and may withdraw from such care at any time.    I spent a total of  32     minutes face to face and non-face to face on the date of this visit.This includes time preparing to see the patient (eg, review of tests, notes), obtaining and/or reviewing additional history from an independent historian and/or outside medical records, documenting clinical information in the electronic health record, independently interpreting results and/or communicating results to the patient/family/caregiver, or care coordinator.  Visit today included increased complexity associated with the care of the episodic problem addressed and managing the longitudinal care of the patient due to the serious and/or complex managed problem(s).    There are no Patient Instructions on file for this visit.    No follow-ups on file.  The ASCVD  Risk score (John DELONG, et al., 2019) failed to calculate for the following reasons:    The 2019 ASCVD risk score is only valid for ages 40 to 79    This note was generated with the assistance of ambient listening technology. Verbal consent was obtained by the patient and accompanying visitor(s) for the recording of patient appointment to facilitate this note. I attest to having reviewed and edited the generated note for accuracy, though some syntax or spelling errors may persist. Please contact the author of this note for any clarification.         [1]   Current Outpatient Medications on File Prior to Visit   Medication Sig Dispense Refill    amitriptyline (ELAVIL) 10 MG tablet Take 1 tablet (10 mg total) by mouth nightly as needed for Insomnia for 7 days, THEN 2 tablets (20 mg total) nightly as needed for Insomnia. 21 tablet 0    celecoxib (CELEBREX) 100 MG capsule Take 1 capsule (100 mg total) by mouth once daily. 30 capsule 1    promethazine (PHENERGAN) 12.5 MG Tab Take 1 tablet (12.5 mg total) by mouth every 6 (six) hours as needed. 20 tablet 1    rizatriptan (MAXALT-MLT) 5 MG disintegrating tablet Take 1 tablet (5 mg total) by mouth as needed for Migraine. May repeat in 2 hours if needed 30 tablet 0    albuterol (VENTOLIN HFA) 90 mcg/actuation inhaler Inhale 1 puff into the lungs every 6 (six) hours as needed for Wheezing. Rescue (Patient not taking: Reported on 2/21/2025) 18 g 0    olopatadine (PATANOL) 0.1 % ophthalmic solution Place 1 drop into both eyes every evening. (Patient not taking: Reported on 2/21/2025) 5 mL 0    umeclidinium-vilanteroL (ANORO ELLIPTA) 62.5-25 mcg/actuation DsDv Inhale 1 puff into the lungs once daily. Controller (Patient not taking: Reported on 2/21/2025) 60 each 6     No current facility-administered medications on file prior to visit.

## 2025-03-07 ENCOUNTER — PATIENT MESSAGE (OUTPATIENT)
Dept: FAMILY MEDICINE | Facility: CLINIC | Age: 30
End: 2025-03-07
Payer: COMMERCIAL

## 2025-03-11 ENCOUNTER — TELEPHONE (OUTPATIENT)
Dept: FAMILY MEDICINE | Facility: CLINIC | Age: 30
End: 2025-03-11
Payer: COMMERCIAL

## 2025-03-11 NOTE — TELEPHONE ENCOUNTER
She was supposed follow up on 2/24/25 for [place] paperwork as I need talk to her for filling this form.  Please make an inperson/virtual appointment

## 2025-03-11 NOTE — TELEPHONE ENCOUNTER
----- Message from Eva sent at 3/11/2025 10:29 AM CDT -----  Type:  Patient Returning CallWho Called:  pt Who Left Message for Patient:  Jair the patient know what this is regarding?:  yesBest Call Back Number:  [phone] (home) Additional Information:  please advise

## 2025-03-11 NOTE — TELEPHONE ENCOUNTER
----- Message from Alexandrea sent at 3/11/2025 10:16 AM CDT -----  Type:  Patient Returning CallWho Called:Luisana Henley Message for Patient:Does the patient know what this is regarding?:SHOLA PaperworkWould the patient rather a call back or a response via [facility]? CallbackBe Call Back Number:[phone] Information: Please callback

## 2025-03-11 NOTE — TELEPHONE ENCOUNTER
Dr. Wyatt, I was in our [place] office on the day of this patients appointment. I am unsure what staff was working with you.

## 2025-03-14 ENCOUNTER — CLINICAL SUPPORT (OUTPATIENT)
Dept: REHABILITATION | Facility: HOSPITAL | Age: 30
End: 2025-03-14
Payer: COMMERCIAL

## 2025-03-14 DIAGNOSIS — M43.6 STIFFNESS OF CERVICAL SPINE: ICD-10-CM

## 2025-03-14 DIAGNOSIS — R29.898 UPPER EXTREMITY WEAKNESS: ICD-10-CM

## 2025-03-14 DIAGNOSIS — M54.2 CERVICALGIA: Primary | ICD-10-CM

## 2025-03-14 PROCEDURE — 97140 MANUAL THERAPY 1/> REGIONS: CPT | Performed by: PHYSICAL THERAPIST

## 2025-03-14 PROCEDURE — 97112 NEUROMUSCULAR REEDUCATION: CPT | Performed by: PHYSICAL THERAPIST

## 2025-03-14 PROCEDURE — 97110 THERAPEUTIC EXERCISES: CPT | Performed by: PHYSICAL THERAPIST

## 2025-03-14 NOTE — PROGRESS NOTES
Outpatient Rehab    Physical Therapy Visit    Patient Name: Marcella Duke  MRN: [id number]  YOB: 1995  Encounter Date: 3/14/2025    Therapy Diagnosis:   Encounter Diagnoses   Name Primary?    Cervicalgia Yes    Stiffness of cervical spine     Upper extremity weakness      Physician: Elisa Wyatt MD    Physician Orders: Eval and Treat  Medical Diagnosis:  Post-concussion headache      Visit # / Visits Authorized:  1/ 20 (+1)  Date of Evaluation:  2/21/2025   Insurance Authorization Period: 2/21/2025 - 12/31/2025   Plan of Care Certification:  2/21/2025 to 04/18/2025      Time In: 0745   Time Out: 0835  Total Time: 50   Total Billable Time: 44    FOTO:  Intake Score:  %  Survey Score 1:  %  Survey Score 2:  %         Subjective   Increased pain and tightness in neck.  Pain reported as 7/10.      Objective            Treatment:  Treatment:  CPT Intervention Performed   Today Duration / Intensity   MT  subscapularis, upper trapezial, first rib mobility bilateral, suboccipital release   x  12 min                       TE  UBE  x  3'/3' FW/BW      shoulder rolls BW          upper trapezial stretch x 30s/ 3x             NMR Series 6 x 3 min pectoral stretch, 1 min all other     Lower trapezial isometrics x 3 min, 5s hold     Chin tucks x 10x/ 2 sets     Prone LT isometrics x  10x/ 2 sets     Prone I's  T's x  x  10x/ 2 sets   10x/ 2 sets      standing theraband rows - red x   10x/ 2 sets    sitting cervical rotation x  2 min   TA                                                                               PLAN Trap stretch, pec stretch, levator stretch, chin tucks, rows          CPT Codes available for Billing:   (12) minutes of Manual therapy (MT) to improve pain and ROM.  (09) minutes of Therapeutic Exercise (TE) to develop strength, endurance, range of motion, and flexibility.  (23) minutes of Neuromuscular Re-Education (NMR)  to improve: Balance, Coordination, Kinesthetic, Sense, Proprioception,  and Posture.  (00) minutes of Therapeutic Activities (TA) to improve functional performance.  Vasopneumatic Device Therapy () for management of swelling/edema. ([id number])  Unattended Electrical Stimulation (ES) for muscle performance or pain modulation.  BFR: Blood flow restriction applied during exercise    Assessment & Plan   Assessment: Patient tolerated all treatment well and was able to complete program as noted for initial treatment session. Cues for all interventions for correct technique and to avoid substitution patterns, not all planned interventions completed due to pt's late arrival today. Encouraged HEP and reviewed all ex's with correction of technique.       Patient will continue to benefit from skilled outpatient physical therapy to address the deficits listed in the problem list box on initial evaluation, provide pt/family education and to maximize pt's level of independence in the home and community environment.     Patient's spiritual, cultural, and educational needs considered and patient agreeable to plan of care and goals.           Plan: Monitor response to today's treatment session and progress with Physical Therapy plan of care as indicated.    Goals:   Active       Functional outcome       Patient will show a significant change in FOTO patient-reported outcome tool to goal score to demonstrate subjective improvement       Start:  02/21/25    Expected End:  04/18/25            Patient will demonstrate independence in home program for support of progression       Start:  02/21/25    Expected End:  03/21/25               Pain       Patient will report pain of 4/10 demonstrating a reduction of overall pain       Start:  02/21/25    Expected End:  04/18/25            Patient will report a 2 point reduction in pain.       Start:  02/21/25    Expected End:  03/21/25               Range of Motion       Patient will achieve bilateral cervical side bending ROM 75 degrees       Start:  02/21/25     Expected End:  04/18/25            Patient will achieve bilateral cervical rotation ROM 35 degrees       Start:  02/21/25    Expected End:  04/18/25               Strength       Patient will achieve bilateral shoulder flexion strength of 4+/5       Start:  02/21/25    Expected End:  04/18/25            Patient will achieve bilateral shoulder abduction strength of 4+/5       Start:  02/21/25    Expected End:  04/18/25                Brandi Calixto PT

## 2025-03-18 PROBLEM — G44.309 POST-CONCUSSION HEADACHE: Status: ACTIVE | Noted: 2025-03-18

## 2025-03-18 PROBLEM — M43.6 STIFFNESS OF CERVICAL SPINE: Status: ACTIVE | Noted: 2025-03-18

## 2025-03-18 PROBLEM — M54.2 CERVICALGIA: Status: ACTIVE | Noted: 2025-03-18

## 2025-03-18 PROBLEM — R29.898 UPPER EXTREMITY WEAKNESS: Status: ACTIVE | Noted: 2025-03-18

## 2025-03-19 ENCOUNTER — CLINICAL SUPPORT (OUTPATIENT)
Dept: REHABILITATION | Facility: HOSPITAL | Age: 30
End: 2025-03-19
Payer: COMMERCIAL

## 2025-03-19 DIAGNOSIS — M43.6 STIFFNESS OF CERVICAL SPINE: ICD-10-CM

## 2025-03-19 DIAGNOSIS — M54.2 CERVICALGIA: Primary | ICD-10-CM

## 2025-03-19 DIAGNOSIS — R29.898 UPPER EXTREMITY WEAKNESS: ICD-10-CM

## 2025-03-19 PROCEDURE — 97140 MANUAL THERAPY 1/> REGIONS: CPT | Performed by: PHYSICAL THERAPIST

## 2025-03-19 PROCEDURE — 97110 THERAPEUTIC EXERCISES: CPT | Performed by: PHYSICAL THERAPIST

## 2025-03-19 PROCEDURE — 97112 NEUROMUSCULAR REEDUCATION: CPT | Performed by: PHYSICAL THERAPIST

## 2025-03-19 NOTE — PROGRESS NOTES
Outpatient Rehab    Physical Therapy Visit    Patient Name: Marcella Duke  MRN: [id number]  YOB: 1995  Encounter Date: 3/19/2025    Therapy Diagnosis:   Encounter Diagnoses   Name Primary?    Cervicalgia Yes    Stiffness of cervical spine     Upper extremity weakness      Physician: Elisa Wyatt MD      Physician Orders: Eval and Treat  Medical Diagnosis: Post-concussion headache    Visit # / Visits Authorized:  2 / 20  Date of Evaluation: 2/21/2025  Insurance Authorization Period: 2/21/2025 to 12/31/2025  Plan of Care Certification:  2/21/2025 to 04/18/2025        Time In: 1535   Time Out: 1630  Total Time: 55   Total Billable Time: 44    FOTO:  Intake Score:  %  Survey Score 1:  %  Survey Score 2:  %         Subjective   Feeling better - was sore after last tx session.  Pain reported as 5/10.      Objective            Treatment:  Treatment:  CPT Intervention Performed   Today Duration / Intensity   MT  subscapularis, upper trapezial, first rib mobility bilateral, suboccipital release   x  12 min                       TE  UBE  x  3'/3' FW/BW      shoulder rolls BW          upper trapezial stretch x 30s/ 3x             NMR Series 6 x 3 min pectoral stretch, 1 min all other     Lower trapezial isometrics x 3 min, 5s hold     Chin tucks x 10x/ 2 sets     Prone LT isometrics x  10x/ 2 sets     Prone I's  T's x  x  10x/ 2 sets   10x/ 2 sets      standing theraband rows - red x   10x/ 2 sets    sitting cervical rotation x  2 min    No moneys  x 10x/ 2 sets red band   TA                                                                               PLAN Trap stretch, pec stretch, levator stretch, chin tucks, rows          CPT Codes available for Billing:   (12) minutes of Manual therapy (MT) to improve pain and ROM.  (09) minutes of Therapeutic Exercise (TE) to develop strength, endurance, range of motion, and flexibility.  (23) minutes of Neuromuscular Re-Education (NMR)  to improve: Balance,  Coordination, Kinesthetic, Sense, Proprioception, and Posture.  (00) minutes of Therapeutic Activities (TA) to improve functional performance.  Vasopneumatic Device Therapy () for management of swelling/edema. (63680)  Unattended Electrical Stimulation (ES) for muscle performance or pain modulation.  BFR: Blood flow restriction applied during exercise    Assessment & Plan   Assessment: Patient tolerated all treatment well and was able to complete program as noted this treatment session. Cues for all interventions for correct technique and to avoid substitution patterns. Encouraged HEP       Patient will continue to benefit from skilled outpatient physical therapy to address the deficits listed in the problem list box on initial evaluation, provide pt/family education and to maximize pt's level of independence in the home and community environment.     Patient's spiritual, cultural, and educational needs considered and patient agreeable to plan of care and goals.           Plan: Monitor response to today's treatment session and progress with Physical Therapy plan of care as indicated.    Goals:   Active       Functional outcome       Patient will show a significant change in FOTO patient-reported outcome tool to goal score to demonstrate subjective improvement       Start:  02/21/25    Expected End:  04/18/25            Patient will demonstrate independence in home program for support of progression       Start:  02/21/25    Expected End:  03/21/25               Pain       Patient will report pain of 4/10 demonstrating a reduction of overall pain       Start:  02/21/25    Expected End:  04/18/25            Patient will report a 2 point reduction in pain.       Start:  02/21/25    Expected End:  03/21/25               Range of Motion       Patient will achieve bilateral cervical side bending ROM 75 degrees       Start:  02/21/25    Expected End:  04/18/25            Patient will achieve bilateral cervical rotation  ROM 35 degrees       Start:  02/21/25    Expected End:  04/18/25               Strength       Patient will achieve bilateral shoulder flexion strength of 4+/5       Start:  02/21/25    Expected End:  04/18/25            Patient will achieve bilateral shoulder abduction strength of 4+/5       Start:  02/21/25    Expected End:  04/18/25                Brandi Calixto PT

## 2025-03-20 ENCOUNTER — CLINICAL SUPPORT (OUTPATIENT)
Dept: REHABILITATION | Facility: HOSPITAL | Age: 30
End: 2025-03-20
Payer: COMMERCIAL

## 2025-03-20 DIAGNOSIS — R29.898 UPPER EXTREMITY WEAKNESS: ICD-10-CM

## 2025-03-20 DIAGNOSIS — M54.2 CERVICALGIA: Primary | ICD-10-CM

## 2025-03-20 DIAGNOSIS — M43.6 STIFFNESS OF CERVICAL SPINE: ICD-10-CM

## 2025-03-20 PROCEDURE — 97110 THERAPEUTIC EXERCISES: CPT

## 2025-03-20 PROCEDURE — 97112 NEUROMUSCULAR REEDUCATION: CPT

## 2025-03-20 PROCEDURE — 97140 MANUAL THERAPY 1/> REGIONS: CPT

## 2025-03-20 NOTE — PROGRESS NOTES
Outpatient Rehab    Physical Therapy Visit    Patient Name: Marcella Duke  MRN: [id number]  YOB: 1995  Encounter Date: 3/20/2025    Therapy Diagnosis:   Encounter Diagnoses   Name Primary?    Cervicalgia Yes    Stiffness of cervical spine     Upper extremity weakness      Physician: Elisa Wyatt MD      Physician Orders: Eval and Treat  Medical Diagnosis: Post-concussion headache    Visit # / Visits Authorized:  3 / 20  Date of Evaluation: 2/21/2025  Insurance Authorization Period: 2/21/2025 to 12/31/2025  Plan of Care Certification:  2/21/2025 to 04/18/2025        Time In: 1535   Time Out: 1632  Total Time: 57   Total Billable Time: 55    FOTO:  Intake Score:  %  Survey Score 1:  %  Survey Score 2:  %         Subjective   Patient reoports she did much better after last session. Took it a little lighter on the exercises. No headaches currently..  Pain reported as 3/10.      Objective            Treatment:  Treatment:  CPT Intervention Performed   Today Duration / Intensity   MT Soft tissue massage   x subscapularis, upper trapezial, first rib mobility bilateral, suboccipital release                        TE  UBE x  3'/3' FW/BW      shoulder rolls BW         upper trapezial stretch x 30s/ 3x             NMR Series 6 x 3 min pectoral stretch, 1 min all other     Lower trapezial isometrics x 3 min, 5s hold     Chin tucks x 10x/ 2 sets     Prone LT isometrics x  10x/ 2 sets     Prone I's  T's x  x  10x/ 2 sets   10x/ 2 sets      standing theraband rows - red x   10x/ 2 sets    sitting cervical rotation x  2 min    No moneys  x 10x/ 2 sets red band   TA                                                                               PLAN Trap stretch, pec stretch, levator stretch, chin tucks, rows          CPT Codes available for Billing:   (10) minutes of Manual therapy (MT) to improve pain and ROM.  (12) minutes of Therapeutic Exercise (TE) to develop strength, endurance, range of motion, and  flexibility.  (32) minutes of Neuromuscular Re-Education (NMR)  to improve: Balance, Coordination, Kinesthetic, Sense, Proprioception, and Posture.  (00) minutes of Therapeutic Activities (TA) to improve functional performance.  Vasopneumatic Device Therapy () for management of swelling/edema. (93969)  Unattended Electrical Stimulation (ES) for muscle performance or pain modulation.  BFR: Blood flow restriction applied during exercise    Assessment & Plan   Assessment:         Patient will continue to benefit from skilled outpatient physical therapy to address the deficits listed in the problem list box on initial evaluation, provide pt/family education and to maximize pt's level of independence in the home and community environment.     Patient's spiritual, cultural, and educational needs considered and patient agreeable to plan of care and goals.           Plan:      Goals:   Active       Functional outcome       Patient will show a significant change in FOTO patient-reported outcome tool to goal score to demonstrate subjective improvement       Start:  02/21/25    Expected End:  04/18/25            Patient will demonstrate independence in home program for support of progression       Start:  02/21/25    Expected End:  03/21/25               Pain       Patient will report pain of 4/10 demonstrating a reduction of overall pain       Start:  02/21/25    Expected End:  04/18/25            Patient will report a 2 point reduction in pain.       Start:  02/21/25    Expected End:  03/21/25               Range of Motion       Patient will achieve bilateral cervical side bending ROM 75 degrees       Start:  02/21/25    Expected End:  04/18/25            Patient will achieve bilateral cervical rotation ROM 35 degrees       Start:  02/21/25    Expected End:  04/18/25               Strength       Patient will achieve bilateral shoulder flexion strength of 4+/5       Start:  02/21/25    Expected End:  04/18/25             Patient will achieve bilateral shoulder abduction strength of 4+/5       Start:  02/21/25    Expected End:  04/18/25                Oli Sexton PT

## 2025-03-27 ENCOUNTER — CLINICAL SUPPORT (OUTPATIENT)
Dept: REHABILITATION | Facility: HOSPITAL | Age: 30
End: 2025-03-27
Payer: COMMERCIAL

## 2025-03-27 DIAGNOSIS — M54.2 CERVICALGIA: Primary | ICD-10-CM

## 2025-03-27 DIAGNOSIS — R29.898 UPPER EXTREMITY WEAKNESS: ICD-10-CM

## 2025-03-27 DIAGNOSIS — M43.6 STIFFNESS OF CERVICAL SPINE: ICD-10-CM

## 2025-03-27 PROCEDURE — 97530 THERAPEUTIC ACTIVITIES: CPT

## 2025-03-27 PROCEDURE — 97110 THERAPEUTIC EXERCISES: CPT

## 2025-03-27 PROCEDURE — 97112 NEUROMUSCULAR REEDUCATION: CPT

## 2025-03-27 NOTE — PROGRESS NOTES
"  Outpatient Rehab    Physical Therapy Visit    Patient Name: Marcella Duke  MRN: [id number]  YOB: 1995  Encounter Date: 3/27/2025    Therapy Diagnosis:   Encounter Diagnoses   Name Primary?    Cervicalgia Yes    Stiffness of cervical spine     Upper extremity weakness      Physician: Elisa Wyatt MD      Physician Orders: Eval and Treat  Medical Diagnosis: Post-concussion headache    Visit # / Visits Authorized:  4 / 20  Date of Evaluation: 2/21/2025  Insurance Authorization Period: 2/21/2025 to 12/31/2025  Plan of Care Certification:  2/21/2025 to 04/18/2025        Time In:     Time Out:    Total Time:     Total Billable Time: 55    FOTO:  Intake Score:  %  Survey Score 1:  %  Survey Score 2:  %         Subjective             Objective            Treatment:  Treatment:  CPT Intervention Performed   Today Duration / Intensity   MT Soft tissue massage   x subscapularis, upper trapezial, first rib mobility bilateral, suboccipital release                        TE UBE x  3'/3' FW/BW     Shoulder rolls BW x X25 each way      Upper trapezial stretch x 30s/ 3x             NMR Series 6 x 3 min pectoral stretch, 1 min all other     Lower trapezial isometrics  3 min, 5s hold     Chin tucks x 3 minutes 5" holds     Prone LT isometrics   10x/ 2 sets     Prone I's  T's x  x  10x/ 2 sets   10x/ 2 sets    sitting cervical rotation   2 min    No moneys  x 10x/ 2 sets red band               TA Rows x 3 x 10 7.5#     Shoulder Extension x 3 x 10 5#                                             PLAN Trap stretch, pec stretch, levator stretch, chin tucks, rows          CPT Codes available for Billing:   (00) minutes of Manual therapy (MT) to improve pain and ROM.  (14) minutes of Therapeutic Exercise (TE) to develop strength, endurance, range of motion, and flexibility.  (26) minutes of Neuromuscular Re-Education (NMR)  to improve: Balance, Coordination, Kinesthetic, Sense, Proprioception, and Posture.  (10) minutes " of Therapeutic Activities (TA) to improve functional performance.  Vasopneumatic Device Therapy () for management of swelling/edema. (27363)  Unattended Electrical Stimulation (ES) for muscle performance or pain modulation.  BFR: Blood flow restriction applied during exercise    Assessment & Plan   Assessment:         Patient will continue to benefit from skilled outpatient physical therapy to address the deficits listed in the problem list box on initial evaluation, provide pt/family education and to maximize pt's level of independence in the home and community environment.     Patient's spiritual, cultural, and educational needs considered and patient agreeable to plan of care and goals.           Plan:      Goals:   Active       Functional outcome       Patient will show a significant change in FOTO patient-reported outcome tool to goal score to demonstrate subjective improvement       Start:  02/21/25    Expected End:  04/18/25            Patient will demonstrate independence in home program for support of progression       Start:  02/21/25    Expected End:  03/21/25               Pain       Patient will report pain of 4/10 demonstrating a reduction of overall pain       Start:  02/21/25    Expected End:  04/18/25            Patient will report a 2 point reduction in pain.       Start:  02/21/25    Expected End:  03/21/25               Range of Motion       Patient will achieve bilateral cervical side bending ROM 75 degrees       Start:  02/21/25    Expected End:  04/18/25            Patient will achieve bilateral cervical rotation ROM 35 degrees       Start:  02/21/25    Expected End:  04/18/25               Strength       Patient will achieve bilateral shoulder flexion strength of 4+/5       Start:  02/21/25    Expected End:  04/18/25            Patient will achieve bilateral shoulder abduction strength of 4+/5       Start:  02/21/25    Expected End:  04/18/25                Oli Sexton, PT

## 2025-03-28 ENCOUNTER — CLINICAL SUPPORT (OUTPATIENT)
Dept: REHABILITATION | Facility: HOSPITAL | Age: 30
End: 2025-03-28
Payer: COMMERCIAL

## 2025-03-28 DIAGNOSIS — M43.6 STIFFNESS OF CERVICAL SPINE: ICD-10-CM

## 2025-03-28 DIAGNOSIS — M54.2 CERVICALGIA: Primary | ICD-10-CM

## 2025-03-28 DIAGNOSIS — R29.898 UPPER EXTREMITY WEAKNESS: ICD-10-CM

## 2025-03-28 PROCEDURE — 97112 NEUROMUSCULAR REEDUCATION: CPT

## 2025-03-28 PROCEDURE — 97530 THERAPEUTIC ACTIVITIES: CPT

## 2025-03-28 PROCEDURE — 97110 THERAPEUTIC EXERCISES: CPT

## 2025-03-28 NOTE — PROGRESS NOTES
"  Outpatient Rehab    Physical Therapy Progress Note    Patient Name: Marcella Duke  MRN: [id number]  YOB: 1995  Encounter Date: 3/28/2025    Therapy Diagnosis:   Encounter Diagnoses   Name Primary?    Cervicalgia Yes    Stiffness of cervical spine     Upper extremity weakness      Physician: Elisa Waytt MD    Physician Orders: Eval and Treat  Medical Diagnosis: Post-concussion headache  Visit # / Visits Authorized:  5 / 20  Insurance Authorization Period: 2/21/2025 to 12/31/2025  Date of Evaluation: 2/21/2025  Plan of Care Certification:  2/21/2025 to 04/18/2025     PT/PTA:     Number of PTA visits since last PT visit:   Time In: 1501   Time Out: 1600  Total Time: 59   Total Billable Time:  59    FOTO:  Intake Score:  %  Survey Score 1: 57%  Survey Score 2:  %     Subjective   Patient reports she feels about 75% improvement with PT. Notes the only deficits remain are getting headaches 3-4 times per week and mild low back pain..  Pain reported as 0/10.      Objective      Subcranial Range of Motion   Active Restricted? Passive Restricted? Pain   Flexion         Protraction         Retraction           Cervical Range of Motion   Active (deg) Passive (deg) Pain   Flexion 55       Extension 70       Right Lateral Flexion 40       Right Rotation 80       Left Lateral Flexion 35       Left Rotation 80                 Treatment:  CPT Intervention Performed   Today Duration / Intensity   MT Soft tissue massage    subscapularis, upper trapezial, first rib mobility bilateral, suboccipital release                        TE  UBE x  3'/3' FW/BW      shoulder rolls BW          upper trapezial stretch x 30s/ 3x    Pec Stretch x 3 x 30"          Objective Testing x Measurements, FOTO, education              NMR Series 6 x 3 min pectoral stretch, 1 min all other     Chin tucks x 3 minutes with 5" holds     Prone I's  T's  W's x  x  x  10x/ 2 sets   10x/ 2 sets   10x/ 2 sets     sitting cervical rotation   2 " min     No moneys  x 10x/ 2 sets red band         TA Rows x 3 x 10 7.5#     Shoulder Extensions x 3 x 10 5#                 PLAN Trap stretch, pec stretch, levator stretch, chin tucks, rows          CPT Codes available for Billing:   (00) minutes of Manual therapy (MT) to improve pain and ROM.  (18) minutes of Therapeutic Exercise (TE) to develop strength, endurance, range of motion, and flexibility.  (26) minutes of Neuromuscular Re-Education (NMR)  to improve: Balance, Coordination, Kinesthetic, Sense, Proprioception, and Posture.  (10) minutes of Therapeutic Activities (TA) to improve functional performance.  Vasopneumatic Device Therapy () for management of swelling/edema. (19335)  Unattended Electrical Stimulation (ES) for muscle performance or pain modulation.  BFR: Blood flow restriction applied during exercise    Assessment & Plan   Assessment: Patient has progressed well with PT. Pt demonstrates good progress with cervical ROM in all planes, decreased reported pain, and improved functional ability as demonstrated by FOTO. Pt continues to have headaches 3-4 times per week and this is her main deficit remains. Pt would continue to benefit from UE and postural strengthening.     Patient will continue to benefit from skilled outpatient physical therapy to address the deficits listed in the problem list box on initial evaluation, provide pt/family education and to maximize pt's level of independence in the home and community environment.     Patient's spiritual, cultural, and educational needs considered and patient agreeable to plan of care and goals.      Plan: Monitor response to today's treatment session and progress with Physical Therapy plan of care as indicated.    Goals:   Active       Functional outcome       Patient will show a significant change in FOTO patient-reported outcome tool to goal score to demonstrate subjective improvement       Start:  02/21/25    Expected End:  04/18/25             Patient will demonstrate independence in home program for support of progression       Start:  02/21/25    Expected End:  03/21/25               Pain       Patient will report pain of 4/10 demonstrating a reduction of overall pain       Start:  02/21/25    Expected End:  04/18/25            Patient will report a 2 point reduction in pain.       Start:  02/21/25    Expected End:  03/21/25               Range of Motion       Patient will achieve bilateral cervical side bending ROM 75 degrees       Start:  02/21/25    Expected End:  04/18/25            Patient will achieve bilateral cervical rotation ROM 35 degrees       Start:  02/21/25    Expected End:  04/18/25               Strength       Patient will achieve bilateral shoulder flexion strength of 4+/5       Start:  02/21/25    Expected End:  04/18/25            Patient will achieve bilateral shoulder abduction strength of 4+/5       Start:  02/21/25    Expected End:  04/18/25                Oli Sexton, PT

## 2025-04-01 ENCOUNTER — TELEPHONE (OUTPATIENT)
Facility: CLINIC | Age: 30
End: 2025-04-01
Payer: COMMERCIAL

## 2025-04-01 ENCOUNTER — DOCUMENTATION ONLY (OUTPATIENT)
Dept: REHABILITATION | Facility: HOSPITAL | Age: 30
End: 2025-04-01

## 2025-04-01 ENCOUNTER — CLINICAL SUPPORT (OUTPATIENT)
Dept: REHABILITATION | Facility: HOSPITAL | Age: 30
End: 2025-04-01
Payer: COMMERCIAL

## 2025-04-01 DIAGNOSIS — M54.2 CERVICALGIA: Primary | ICD-10-CM

## 2025-04-01 DIAGNOSIS — M43.6 STIFFNESS OF CERVICAL SPINE: ICD-10-CM

## 2025-04-01 DIAGNOSIS — R29.898 UPPER EXTREMITY WEAKNESS: ICD-10-CM

## 2025-04-01 PROCEDURE — 97110 THERAPEUTIC EXERCISES: CPT | Mod: CQ

## 2025-04-01 PROCEDURE — 97112 NEUROMUSCULAR REEDUCATION: CPT | Mod: CQ

## 2025-04-01 PROCEDURE — 97530 THERAPEUTIC ACTIVITIES: CPT | Mod: CQ

## 2025-04-01 NOTE — TELEPHONE ENCOUNTER
----- Message from Ginger sent at 4/1/2025  9:45 AM CDT -----  Contact: Marcella  Type:  Needs Medical AdviceWho Called: DelorisGeraldshanel wants to speak about her appointment on April/17/25 some medical concerns.Would the patient rather a call back or a response via [facility]? Call Bac[facility] Call Back Number: [phone]!

## 2025-04-01 NOTE — TELEPHONE ENCOUNTER
Called pt to discuss concerns about her visit. She was confirming what the reason for visit was noted as. She confirmed that she is coming for headaches and confirmed that [facility] location is okay. I advised that we would call the day before her visit to remind her. She verbalized understanding.

## 2025-04-01 NOTE — PROGRESS NOTES
PT/PTA met face to face to discuss pt's treatment plan and progress towards established goals. Pt will be seen by a physical therapist minimally every 6th visit or every 30 days.      Stephen Dinero PTA

## 2025-04-01 NOTE — PROGRESS NOTES
"  Outpatient Rehab    Physical Therapy Progress Note    Patient Name: Marcella Duke  MRN: [id number]  YOB: 1995  Encounter Date: 4/1/2025    Therapy Diagnosis:   Encounter Diagnoses   Name Primary?    Cervicalgia Yes    Stiffness of cervical spine     Upper extremity weakness      Physician: Elisa Wyatt MD    Physician Orders: Eval and Treat  Medical Diagnosis: Post-concussion headache  Visit # / Visits Authorized:  6 / 20  Insurance Authorization Period: 2/21/2025 to 12/31/2025  Date of Evaluation: 2/21/2025  Plan of Care Certification:  2/21/2025 to 04/18/2025     PT/PTA: PTA   Number of PTA visits since last PT visit:1  Time In: 0749   Time Out: 0838 (late arrival but patient able to get a full session)  Total Time: 49   Total Billable Time:  49    FOTO:  Intake Score:  %  Survey Score 1:  %  Survey Score 2:  %     Subjective   feeling pretty good today.         Objective       Treatment:  CPT Intervention Performed   Today Duration / Intensity   MT Soft tissue massage    subscapularis, upper trapezial, first rib mobility bilateral, suboccipital release                        TE  UBE x  3'/3' FW/BW      shoulder rolls BW          upper trapezial stretch x 30s/ 3x without over pressure    Levator stretch x 3 x 30 seconds with over pressure    Pec Stretch x 3 x 30"          Objective Testing  Measurements, FOTO, education              NMR Series 6 x , 2 min all other     Chin tucks x 3 minutes with 5" holds     Prone I's  T's  W's x  x  x 3 x 8  3 x 8  3 x 8      sitting cervical rotation   2 min     No moneys  x 10x/ 2 sets red band         TA Rows x 3 x 10 7.5#     Shoulder Extensions x 3 x 10 5#                 PLAN Trap stretch, pec stretch, rows          CPT Codes available for Billing:   (00) minutes of Manual therapy (MT) to improve pain and ROM.  (12) minutes of Therapeutic Exercise (TE) to develop strength, endurance, range of motion, and flexibility.  (29) minutes of Neuromuscular " Re-Education (NMR)  to improve: Balance, Coordination, Kinesthetic, Sense, Proprioception, and Posture.  (8) minutes of Therapeutic Activities (TA) to improve functional performance.  Vasopneumatic Device Therapy () for management of swelling/edema. (33455)  Unattended Electrical Stimulation (ES) for muscle performance or pain modulation.  BFR: Blood flow restriction applied during exercise    Assessment & Plan   Assessment: pt did well with all exercises and was able to be progressed with posterior chain strengthening with no complaints.     Patient will continue to benefit from skilled outpatient physical therapy to address the deficits listed in the problem list box on initial evaluation, provide pt/family education and to maximize pt's level of independence in the home and community environment.     Patient's spiritual, cultural, and educational needs considered and patient agreeable to plan of care and goals.      Plan: Monitor response to today's treatment session and progress with Physical Therapy plan of care as indicated.    Goals:   Active       Functional outcome       Patient will show a significant change in FOTO patient-reported outcome tool to goal score to demonstrate subjective improvement       Start:  02/21/25    Expected End:  04/18/25            Patient will demonstrate independence in home program for support of progression       Start:  02/21/25    Expected End:  03/21/25               Pain       Patient will report pain of 4/10 demonstrating a reduction of overall pain       Start:  02/21/25    Expected End:  04/18/25            Patient will report a 2 point reduction in pain.       Start:  02/21/25    Expected End:  03/21/25               Range of Motion       Patient will achieve bilateral cervical side bending ROM 75 degrees       Start:  02/21/25    Expected End:  04/18/25            Patient will achieve bilateral cervical rotation ROM 35 degrees       Start:  02/21/25    Expected End:   04/18/25               Strength       Patient will achieve bilateral shoulder flexion strength of 4+/5       Start:  02/21/25    Expected End:  04/18/25            Patient will achieve bilateral shoulder abduction strength of 4+/5       Start:  02/21/25    Expected End:  04/18/25                Stephen Dinero PTA

## 2025-04-04 ENCOUNTER — CLINICAL SUPPORT (OUTPATIENT)
Dept: REHABILITATION | Facility: HOSPITAL | Age: 30
End: 2025-04-04
Payer: COMMERCIAL

## 2025-04-04 DIAGNOSIS — M43.6 STIFFNESS OF CERVICAL SPINE: ICD-10-CM

## 2025-04-04 DIAGNOSIS — R29.898 UPPER EXTREMITY WEAKNESS: ICD-10-CM

## 2025-04-04 DIAGNOSIS — M54.2 CERVICALGIA: Primary | ICD-10-CM

## 2025-04-04 PROCEDURE — 97530 THERAPEUTIC ACTIVITIES: CPT

## 2025-04-04 PROCEDURE — 97110 THERAPEUTIC EXERCISES: CPT

## 2025-04-04 PROCEDURE — 97112 NEUROMUSCULAR REEDUCATION: CPT

## 2025-04-04 NOTE — PROGRESS NOTES
"  Outpatient Rehab    Physical Therapy Progress Note    Patient Name: Marcella Duke  MRN: [id number]  YOB: 1995  Encounter Date: 4/4/2025    Therapy Diagnosis:   Encounter Diagnoses   Name Primary?    Cervicalgia Yes    Stiffness of cervical spine     Upper extremity weakness      Physician: Elisa Wyatt MD    Physician Orders: Eval and Treat  Medical Diagnosis: Post-concussion headache  Visit # / Visits Authorized:  7 / 20  Insurance Authorization Period: 2/21/2025 to 12/31/2025  Date of Evaluation: 2/21/2025  Plan of Care Certification:  2/21/2025 to 04/18/2025     PT/PTA:     Number of PTA visits since last PT visit:   Time In: 1507   Time Out: 1601 (late arrival but patient able to get a full session)  Total Time: 54   Total Billable Time:  53    FOTO:  Intake Score:  %  Survey Score 1:  %  Survey Score 2:  %     Subjective   Patient reports she is feeling good lately. Has not had any headaches. Mild tension at times..  Pain reported as 0/10.      Objective       Treatment:  CPT Intervention Performed   Today Duration / Intensity   MT Soft tissue massage    subscapularis, upper trapezial, first rib mobility bilateral, suboccipital release                        TE UBE x  3'/3' FW/BW     Shoulder rolls BW         Upper trapezial stretch  30s/ 3x without over pressure    Levator stretch  3 x 30 seconds with over pressure    Pec Stretch x 3 x 30"          Objective Testing  Measurements, FOTO, education              NMR Series 6 x 3 min pectoral stretch, 2 min all other     Chin tucks, Supine with lift x 20 x 5" holds     Prone I's  T's  W's x  x  x 30 x 5" holds  30 x 5" holds  30 x 5" holds     sitting cervical rotation   2 min     No moneys  x 3 x 15 RTB         TA Rows x 3 x 10 7.5#     Shoulder Extensions x 3 x 10 5#                 PLAN Trap stretch, pec stretch, rows          CPT Codes available for Billing:   (00) minutes of Manual therapy (MT) to improve pain and ROM.  (10) minutes of " Therapeutic Exercise (TE) to develop strength, endurance, range of motion, and flexibility.  (30) minutes of Neuromuscular Re-Education (NMR)  to improve: Balance, Coordination, Kinesthetic, Sense, Proprioception, and Posture.  (10) minutes of Therapeutic Activities (TA) to improve functional performance.  Vasopneumatic Device Therapy () for management of swelling/edema. (46405)  Unattended Electrical Stimulation (ES) for muscle performance or pain modulation.  BFR: Blood flow restriction applied during exercise    Assessment & Plan   Assessment:       Patient will continue to benefit from skilled outpatient physical therapy to address the deficits listed in the problem list box on initial evaluation, provide pt/family education and to maximize pt's level of independence in the home and community environment.     Patient's spiritual, cultural, and educational needs considered and patient agreeable to plan of care and goals.      Plan: Continue Plan of Care (POC) and progress per patient tolerance. See treatment section for details on planned progressions next session.    Goals:   Active       Functional outcome       Patient will show a significant change in FOTO patient-reported outcome tool to goal score to demonstrate subjective improvement       Start:  02/21/25    Expected End:  04/18/25            Patient will demonstrate independence in home program for support of progression       Start:  02/21/25    Expected End:  03/21/25               Pain       Patient will report pain of 4/10 demonstrating a reduction of overall pain       Start:  02/21/25    Expected End:  04/18/25            Patient will report a 2 point reduction in pain.       Start:  02/21/25    Expected End:  03/21/25               Range of Motion       Patient will achieve bilateral cervical side bending ROM 75 degrees       Start:  02/21/25    Expected End:  04/18/25            Patient will achieve bilateral cervical rotation ROM 35 degrees        Start:  02/21/25    Expected End:  04/18/25               Strength       Patient will achieve bilateral shoulder flexion strength of 4+/5       Start:  02/21/25    Expected End:  04/18/25            Patient will achieve bilateral shoulder abduction strength of 4+/5       Start:  02/21/25    Expected End:  04/18/25                Oli Sexton, PT

## 2025-04-08 ENCOUNTER — CLINICAL SUPPORT (OUTPATIENT)
Dept: REHABILITATION | Facility: HOSPITAL | Age: 30
End: 2025-04-08
Payer: COMMERCIAL

## 2025-04-08 DIAGNOSIS — R29.898 UPPER EXTREMITY WEAKNESS: ICD-10-CM

## 2025-04-08 DIAGNOSIS — M43.6 STIFFNESS OF CERVICAL SPINE: ICD-10-CM

## 2025-04-08 DIAGNOSIS — M54.2 CERVICALGIA: Primary | ICD-10-CM

## 2025-04-08 PROCEDURE — 97112 NEUROMUSCULAR REEDUCATION: CPT

## 2025-04-08 PROCEDURE — 97110 THERAPEUTIC EXERCISES: CPT

## 2025-04-08 PROCEDURE — 97530 THERAPEUTIC ACTIVITIES: CPT

## 2025-04-08 NOTE — PROGRESS NOTES
"  Outpatient Rehab    Physical Therapy Visit     Patient Name: Marcella Duke  MRN: [id number]  YOB: 1995  Encounter Date: 4/8/2025    Therapy Diagnosis:   Encounter Diagnoses   Name Primary?    Cervicalgia Yes    Stiffness of cervical spine     Upper extremity weakness      Physician: Elisa Wyatt MD    Physician Orders: Eval and Treat  Medical Diagnosis: Post-concussion headache  Visit # / Visits Authorized:  8 / 20  Insurance Authorization Period: 2/21/2025 to 12/31/2025  Date of Evaluation: 2/21/2025  Plan of Care Certification:  2/21/2025 to 04/18/2025     PT/PTA:     Number of PTA visits since last PT visit:   Time In: 1433   Time Out: 1528   Total Time: 55   Total Billable Time:  55    FOTO:  Intake Score:  %  Survey Score 1:  %  Survey Score 2:  %     Subjective   Patient reports she continues to feel pretty good with no heaaches and minimal tension..  Pain reported as 0/10.      Objective       Treatment:  CPT Intervention Performed   Today Duration / Intensity   MT Soft tissue massage    subscapularis, upper trapezial, first rib mobility bilateral, suboccipital release                        TE UBE x  3'/3' FW/BW     Shoulder rolls BW         Upper trapezial stretch x 30s/ 3x without over pressure    Levator stretch x 3 x 30 seconds with over pressure    Pec Stretch x 3 x 30"          Objective Testing  Measurements, FOTO, education              NMR Series 6 x 3 min pectoral stretch, 2 min all other     Chin tucks, Supine with lift x 20 x 5" holds     Prone I's  T's  W's x  x  x 30 x 5" holds  30 x 5" holds  30 x 5" holds     sitting cervical rotation   2 min     No moneys  x 3 x 15 RTB         TA Rows x 3 x 10 7.5#     Shoulder Extensions x 3 x 10 5#                 PLAN Trap stretch, pec stretch, rows          CPT Codes available for Billing:   (00) minutes of Manual therapy (MT) to improve pain and ROM.  (16) minutes of Therapeutic Exercise (TE) to develop strength, endurance, range " of motion, and flexibility.  (28) minutes of Neuromuscular Re-Education (NMR)  to improve: Balance, Coordination, Kinesthetic, Sense, Proprioception, and Posture.  (10) minutes of Therapeutic Activities (TA) to improve functional performance.  Vasopneumatic Device Therapy () for management of swelling/edema. (27472)  Unattended Electrical Stimulation (ES) for muscle performance or pain modulation.  BFR: Blood flow restriction applied during exercise    Assessment & Plan   Assessment: Patient tolerated session well. Pt able to perform cervicla retractions with improved muscular endruance and depth of observed holds. Noted decrease in tension following foam roll series for improved thoracic and cervical mobility.     Patient will continue to benefit from skilled outpatient physical therapy to address the deficits listed in the problem list box on initial evaluation, provide pt/family education and to maximize pt's level of independence in the home and community environment.     Patient's spiritual, cultural, and educational needs considered and patient agreeable to plan of care and goals.      Plan: Continue Plan of Care (POC) and progress per patient tolerance. See treatment section for details on planned progressions next session.    Goals:   Active       Functional outcome       Patient will show a significant change in FOTO patient-reported outcome tool to goal score to demonstrate subjective improvement       Start:  02/21/25    Expected End:  04/18/25            Patient will demonstrate independence in home program for support of progression       Start:  02/21/25    Expected End:  03/21/25               Pain       Patient will report pain of 4/10 demonstrating a reduction of overall pain       Start:  02/21/25    Expected End:  04/18/25            Patient will report a 2 point reduction in pain.       Start:  02/21/25    Expected End:  03/21/25               Range of Motion       Patient will achieve  bilateral cervical side bending ROM 75 degrees       Start:  02/21/25    Expected End:  04/18/25            Patient will achieve bilateral cervical rotation ROM 35 degrees       Start:  02/21/25    Expected End:  04/18/25               Strength       Patient will achieve bilateral shoulder flexion strength of 4+/5       Start:  02/21/25    Expected End:  04/18/25            Patient will achieve bilateral shoulder abduction strength of 4+/5       Start:  02/21/25    Expected End:  04/18/25              Oli Sexton, PT

## 2025-04-09 ENCOUNTER — CLINICAL SUPPORT (OUTPATIENT)
Dept: REHABILITATION | Facility: HOSPITAL | Age: 30
End: 2025-04-09
Payer: COMMERCIAL

## 2025-04-09 DIAGNOSIS — R29.898 UPPER EXTREMITY WEAKNESS: ICD-10-CM

## 2025-04-09 DIAGNOSIS — M43.6 STIFFNESS OF CERVICAL SPINE: ICD-10-CM

## 2025-04-09 DIAGNOSIS — M54.2 CERVICALGIA: Primary | ICD-10-CM

## 2025-04-09 PROCEDURE — 97530 THERAPEUTIC ACTIVITIES: CPT

## 2025-04-09 PROCEDURE — 97112 NEUROMUSCULAR REEDUCATION: CPT

## 2025-04-09 PROCEDURE — 97110 THERAPEUTIC EXERCISES: CPT

## 2025-04-09 NOTE — PROGRESS NOTES
"  Outpatient Rehab    Physical Therapy Visit     Patient Name: Marcella Duke  MRN: [id number]  YOB: 1995  Encounter Date: 4/9/2025    Therapy Diagnosis:   Encounter Diagnoses   Name Primary?    Cervicalgia Yes    Stiffness of cervical spine     Upper extremity weakness      Physician: Elisa Wyatt MD    Physician Orders: Eval and Treat  Medical Diagnosis: Post-concussion headache  Visit # / Visits Authorized:  9 / 20  Insurance Authorization Period: 2/21/2025 to 12/31/2025  Date of Evaluation: 2/21/2025  Plan of Care Certification:  2/21/2025 to 04/18/2025     PT/PTA:     Number of PTA visits since last PT visit: 0  Time In: 0746   Time Out: 0840   Total Time: 54   Total Billable Time:  50    FOTO:  Intake Score:  %  Survey Score 1:  %  Survey Score 2:  %     Subjective   Patient reports feeling some fatigue and soreness from yesterday's session but overall feeling good..  Pain reported as 0/10.      Objective       Treatment:  CPT Intervention Performed   Today Duration / Intensity   MT Soft tissue massage    subscapularis, upper trapezial, first rib mobility bilateral, suboccipital release                        TE UBE x  3'/3' FW/BW     Shoulder rolls BW         Upper trapezial stretch x 30s/ 3x without over pressure    Levator stretch x 3 x 30 seconds with over pressure    Pec Stretch x 3 x 30"          Objective Testing  Measurements, FOTO, education              NMR Series 6 x 3 min pectoral stretch, 2 min all other     Chin tucks, Supine with lift x 20 x 5" holds     Prone I's  T's  W's x  x  x 30 x 5" holds  30 x 5" holds  30 x 5" holds     Sitting cervical rotation  2 min     No moneys  x 3 x 15 RTB         TA Rows x 3 x 10 7.5#     Shoulder Extensions x 3 x 10 5#                 PLAN          CPT Codes available for Billing:   (00) minutes of Manual therapy (MT) to improve pain and ROM.  (14) minutes of Therapeutic Exercise (TE) to develop strength, endurance, range of motion, and " flexibility.  (26) minutes of Neuromuscular Re-Education (NMR)  to improve: Balance, Coordination, Kinesthetic, Sense, Proprioception, and Posture.  (10) minutes of Therapeutic Activities (TA) to improve functional performance.  Vasopneumatic Device Therapy () for management of swelling/edema. (41027)  Unattended Electrical Stimulation (ES) for muscle performance or pain modulation.  BFR: Blood flow restriction applied during exercise    Assessment & Plan   Assessment: Patient tolerated session well. Soreness and stiffness noted at beginning of session reduced with stretching and mobility exercises. Good form with scapular retraction noted with rows and shoulder extensions today.     Patient will continue to benefit from skilled outpatient physical therapy to address the deficits listed in the problem list box on initial evaluation, provide pt/family education and to maximize pt's level of independence in the home and community environment.     Patient's spiritual, cultural, and educational needs considered and patient agreeable to plan of care and goals.      Plan: Continue Plan of Care (POC) and progress per patient tolerance. See treatment section for details on planned progressions next session.    Goals:   Active       Functional outcome       Patient will show a significant change in FOTO patient-reported outcome tool to goal score to demonstrate subjective improvement       Start:  02/21/25    Expected End:  04/18/25            Patient will demonstrate independence in home program for support of progression       Start:  02/21/25    Expected End:  03/21/25               Pain       Patient will report pain of 4/10 demonstrating a reduction of overall pain       Start:  02/21/25    Expected End:  04/18/25            Patient will report a 2 point reduction in pain.       Start:  02/21/25    Expected End:  03/21/25               Range of Motion       Patient will achieve bilateral cervical side bending ROM 75  degrees       Start:  02/21/25    Expected End:  04/18/25            Patient will achieve bilateral cervical rotation ROM 35 degrees       Start:  02/21/25    Expected End:  04/18/25               Strength       Patient will achieve bilateral shoulder flexion strength of 4+/5       Start:  02/21/25    Expected End:  04/18/25            Patient will achieve bilateral shoulder abduction strength of 4+/5       Start:  02/21/25    Expected End:  04/18/25              Oli Sexton, PT

## 2025-04-14 ENCOUNTER — CLINICAL SUPPORT (OUTPATIENT)
Dept: REHABILITATION | Facility: HOSPITAL | Age: 30
End: 2025-04-14
Payer: COMMERCIAL

## 2025-04-14 DIAGNOSIS — R29.898 UPPER EXTREMITY WEAKNESS: ICD-10-CM

## 2025-04-14 DIAGNOSIS — M43.6 STIFFNESS OF CERVICAL SPINE: ICD-10-CM

## 2025-04-14 DIAGNOSIS — M54.2 CERVICALGIA: Primary | ICD-10-CM

## 2025-04-14 PROCEDURE — 97110 THERAPEUTIC EXERCISES: CPT | Mod: CQ

## 2025-04-14 PROCEDURE — 97530 THERAPEUTIC ACTIVITIES: CPT | Mod: CQ

## 2025-04-14 PROCEDURE — 97112 NEUROMUSCULAR REEDUCATION: CPT | Mod: CQ

## 2025-04-14 NOTE — PROGRESS NOTES
"  Outpatient Rehab    Physical Therapy Visit     Patient Name: Marcella Duke  MRN: [id number]  YOB: 1995  Encounter Date: 4/14/2025    Therapy Diagnosis:   Encounter Diagnoses   Name Primary?    Cervicalgia Yes    Stiffness of cervical spine     Upper extremity weakness      Physician: Elisa Wyatt MD    Physician Orders: Eval and Treat  Medical Diagnosis: Post-concussion headache  Visit # / Visits Authorized:  10 / 20  Insurance Authorization Period: 2/21/2025 to 12/31/2025  Date of Evaluation: 2/21/2025  Plan of Care Certification:  2/21/2025 to 04/18/2025     PT/PTA: PTA   Number of PTA visits since last PT visit:1  Time In: 0745   Time Out: 0835   Total Time: 50   Total Billable Time:  50    FOTO:  Intake Score:  %  Survey Score 1:  %  Survey Score 2:  %     Subjective   headache the day afer her last therapy session. this headache last about 30 min..  Pain reported as 0/10.      Objective       Treatment:  CPT Intervention Performed   Today Duration / Intensity   MT Soft tissue massage    subscapularis, upper trapezial, first rib mobility bilateral, suboccipital release                        TE UBE x  3'/3' FW/BW     Shoulder rolls BW  x 1 minutes       Upper trapezial stretch x 30s/ 3x without over pressure    Levator stretch  3 x 30 seconds with over pressure    Pec Stretch  3 x 30"          Objective Testing  Measurements, FOTO, education              NMR Series 6 x 3 min pectoral stretch, 2 min all other     Chin tucks, Supine with lift x 20 x 5" holds     Prone I's  T's  W's x  x   30 x 5" holds  30 x 5" holds  30 x 5" holds     Sitting cervical rotation  2 min     No moneys  x 3 x 15 RTB         TA Rows x 3 x 10 7.5#     Shoulder Extensions x 3 x 10 5#                 PLAN          CPT Codes available for Billing:   (00) minutes of Manual therapy (MT) to improve pain and ROM.  (9) minutes of Therapeutic Exercise (TE) to develop strength, endurance, range of motion, and " flexibility.  (33) minutes of Neuromuscular Re-Education (NMR)  to improve: Balance, Coordination, Kinesthetic, Sense, Proprioception, and Posture.  (8) minutes of Therapeutic Activities (TA) to improve functional performance.  Vasopneumatic Device Therapy () for management of swelling/edema. (77108)  Unattended Electrical Stimulation (ES) for muscle performance or pain modulation.  BFR: Blood flow restriction applied during exercise    Assessment & Plan   Assessment: pt with good form with all exercises today. she left this session with no complaints of pain.     Patient will continue to benefit from skilled outpatient physical therapy to address the deficits listed in the problem list box on initial evaluation, provide pt/family education and to maximize pt's level of independence in the home and community environment.     Patient's spiritual, cultural, and educational needs considered and patient agreeable to plan of care and goals.      Plan: Continue Plan of Care (POC) and progress per patient tolerance. See treatment section for details on planned progressions next session.    Goals:   Active       Functional outcome       Patient will show a significant change in FOTO patient-reported outcome tool to goal score to demonstrate subjective improvement       Start:  02/21/25    Expected End:  04/18/25            Patient will demonstrate independence in home program for support of progression       Start:  02/21/25    Expected End:  03/21/25               Pain       Patient will report pain of 4/10 demonstrating a reduction of overall pain       Start:  02/21/25    Expected End:  04/18/25            Patient will report a 2 point reduction in pain.       Start:  02/21/25    Expected End:  03/21/25               Range of Motion       Patient will achieve bilateral cervical side bending ROM 75 degrees       Start:  02/21/25    Expected End:  04/18/25            Patient will achieve bilateral cervical rotation ROM  35 degrees       Start:  02/21/25    Expected End:  04/18/25               Strength       Patient will achieve bilateral shoulder flexion strength of 4+/5       Start:  02/21/25    Expected End:  04/18/25            Patient will achieve bilateral shoulder abduction strength of 4+/5       Start:  02/21/25    Expected End:  04/18/25              Stephen Dinero PTA

## 2025-04-17 ENCOUNTER — OFFICE VISIT (OUTPATIENT)
Dept: FAMILY MEDICINE | Facility: CLINIC | Age: 30
End: 2025-04-17
Payer: COMMERCIAL

## 2025-04-17 ENCOUNTER — PATIENT MESSAGE (OUTPATIENT)
Dept: NEUROLOGY | Facility: CLINIC | Age: 30
End: 2025-04-17

## 2025-04-17 ENCOUNTER — CLINICAL SUPPORT (OUTPATIENT)
Dept: REHABILITATION | Facility: HOSPITAL | Age: 30
End: 2025-04-17
Payer: COMMERCIAL

## 2025-04-17 ENCOUNTER — OFFICE VISIT (OUTPATIENT)
Dept: NEUROLOGY | Facility: CLINIC | Age: 30
End: 2025-04-17
Payer: COMMERCIAL

## 2025-04-17 VITALS
WEIGHT: 158.94 LBS | BODY MASS INDEX: 26.48 KG/M2 | OXYGEN SATURATION: 99 % | HEART RATE: 71 BPM | RESPIRATION RATE: 16 BRPM | SYSTOLIC BLOOD PRESSURE: 135 MMHG | DIASTOLIC BLOOD PRESSURE: 88 MMHG | HEIGHT: 65 IN

## 2025-04-17 VITALS
DIASTOLIC BLOOD PRESSURE: 80 MMHG | HEART RATE: 87 BPM | HEIGHT: 65 IN | SYSTOLIC BLOOD PRESSURE: 120 MMHG | OXYGEN SATURATION: 98 % | RESPIRATION RATE: 16 BRPM | WEIGHT: 158.75 LBS | BODY MASS INDEX: 26.45 KG/M2

## 2025-04-17 DIAGNOSIS — M54.2 CERVICALGIA: ICD-10-CM

## 2025-04-17 DIAGNOSIS — R11.0 NAUSEA: ICD-10-CM

## 2025-04-17 DIAGNOSIS — G44.309 POST-CONCUSSION HEADACHE: Primary | ICD-10-CM

## 2025-04-17 DIAGNOSIS — R29.898 UPPER EXTREMITY WEAKNESS: ICD-10-CM

## 2025-04-17 DIAGNOSIS — J45.50 SEVERE PERSISTENT ASTHMA, UNSPECIFIED WHETHER COMPLICATED: Primary | ICD-10-CM

## 2025-04-17 DIAGNOSIS — M54.2 CERVICALGIA: Primary | ICD-10-CM

## 2025-04-17 DIAGNOSIS — F12.20 CANNABIS DEPENDENCE: ICD-10-CM

## 2025-04-17 DIAGNOSIS — M43.6 STIFFNESS OF CERVICAL SPINE: ICD-10-CM

## 2025-04-17 PROCEDURE — 97112 NEUROMUSCULAR REEDUCATION: CPT | Mod: CQ

## 2025-04-17 PROCEDURE — 3074F SYST BP LT 130 MM HG: CPT | Mod: CPTII,S$GLB,, | Performed by: STUDENT IN AN ORGANIZED HEALTH CARE EDUCATION/TRAINING PROGRAM

## 2025-04-17 PROCEDURE — 99204 OFFICE O/P NEW MOD 45 MIN: CPT | Mod: S$GLB,,,

## 2025-04-17 PROCEDURE — 3075F SYST BP GE 130 - 139MM HG: CPT | Mod: CPTII,S$GLB,,

## 2025-04-17 PROCEDURE — 99999 PR PBB SHADOW E&M-EST. PATIENT-LVL V: CPT | Mod: PBBFAC,,,

## 2025-04-17 PROCEDURE — 99214 OFFICE O/P EST MOD 30 MIN: CPT | Mod: S$GLB,,, | Performed by: STUDENT IN AN ORGANIZED HEALTH CARE EDUCATION/TRAINING PROGRAM

## 2025-04-17 PROCEDURE — 1159F MED LIST DOCD IN RCRD: CPT | Mod: CPTII,S$GLB,, | Performed by: STUDENT IN AN ORGANIZED HEALTH CARE EDUCATION/TRAINING PROGRAM

## 2025-04-17 PROCEDURE — 1159F MED LIST DOCD IN RCRD: CPT | Mod: CPTII,S$GLB,,

## 2025-04-17 PROCEDURE — 1160F RVW MEDS BY RX/DR IN RCRD: CPT | Mod: CPTII,S$GLB,,

## 2025-04-17 PROCEDURE — 3079F DIAST BP 80-89 MM HG: CPT | Mod: CPTII,S$GLB,, | Performed by: STUDENT IN AN ORGANIZED HEALTH CARE EDUCATION/TRAINING PROGRAM

## 2025-04-17 PROCEDURE — G2211 COMPLEX E/M VISIT ADD ON: HCPCS | Mod: S$GLB,,, | Performed by: STUDENT IN AN ORGANIZED HEALTH CARE EDUCATION/TRAINING PROGRAM

## 2025-04-17 PROCEDURE — 3008F BODY MASS INDEX DOCD: CPT | Mod: CPTII,S$GLB,,

## 2025-04-17 PROCEDURE — 3008F BODY MASS INDEX DOCD: CPT | Mod: CPTII,S$GLB,, | Performed by: STUDENT IN AN ORGANIZED HEALTH CARE EDUCATION/TRAINING PROGRAM

## 2025-04-17 PROCEDURE — 99999 PR PBB SHADOW E&M-EST. PATIENT-LVL V: CPT | Mod: PBBFAC,,, | Performed by: STUDENT IN AN ORGANIZED HEALTH CARE EDUCATION/TRAINING PROGRAM

## 2025-04-17 PROCEDURE — 97110 THERAPEUTIC EXERCISES: CPT | Mod: CQ

## 2025-04-17 PROCEDURE — 3079F DIAST BP 80-89 MM HG: CPT | Mod: CPTII,S$GLB,,

## 2025-04-17 RX ORDER — AMITRIPTYLINE HYDROCHLORIDE 10 MG/1
10 TABLET, FILM COATED ORAL NIGHTLY
Qty: 30 TABLET | Refills: 0 | Status: SHIPPED | OUTPATIENT
Start: 2025-04-17 | End: 2025-05-17

## 2025-04-17 RX ORDER — BUDESONIDE AND FORMOTEROL FUMARATE DIHYDRATE 160; 4.5 UG/1; UG/1
2 AEROSOL RESPIRATORY (INHALATION) EVERY 12 HOURS
Refills: 0 | Status: CANCELLED | OUTPATIENT
Start: 2025-04-17 | End: 2026-04-17

## 2025-04-17 RX ORDER — RIZATRIPTAN BENZOATE 10 MG/1
10 TABLET, ORALLY DISINTEGRATING ORAL
Qty: 30 TABLET | Refills: 0 | Status: SHIPPED | OUTPATIENT
Start: 2025-04-17 | End: 2025-05-17

## 2025-04-17 RX ORDER — BUDESONIDE AND FORMOTEROL FUMARATE DIHYDRATE 80; 4.5 UG/1; UG/1
2 AEROSOL RESPIRATORY (INHALATION) 2 TIMES DAILY
Qty: 10.2 G | Refills: 0 | Status: SHIPPED | OUTPATIENT
Start: 2025-04-17 | End: 2026-04-17

## 2025-04-17 NOTE — PROGRESS NOTES
"Subjective:       Patient ID: Marcella Duke is a 29 y.o. female.      Chief Complaint: "Post-concussion headache"        HPI    HPI 29 y.o.  Years old female   with PMHx of  has a past medical history of Asthma and Hypertension affecting pregnancy in third trimester, antepartum.  and other medical conditions came with daughter for the evaluation and recommendation of "Post-concussion headache"    -Patient referred by Elisa Wyatt [place]  [id number] [place] [address]     HPI:  The patient sustained a head injury on 12/9/24 secondary to a slip and fall, striking the occipital region. She was evaluated at [facility] - Emergency Department on the day of injury.  Initial Imaging:  CT Head without Contrast: No acute intracranial abnormalities.  CT Cervical Spine without Contrast:  No evidence of acute bony injury.  Straightening and slight reversal of cervical lordosis, possibly positional or due to paraspinal muscle spasm.  Symptoms at Time of Injury:  No loss of consciousness.  No seizures.  No open wounds or skull fracture.  No visual or auditory loss.  Brief episode of transient visual blackout (<5 seconds), resolved spontaneously.  No focal motor deficits, paralysis, or weakness.  Reported vomiting at the time of injury and throughout the day (resolved).  Dizziness noted acutely (now resolved).     Post-Injury Course:  Development of daily, severe, throbbing headaches rated 10/10 initially.  Headache characteristics: band-like distribution, generalized, occasionally originating bilaterally in occipital region; pressure-like quality; severity varies between 5-10/10.  Associated symptoms: photophobia, phonophobia, nausea.  No associated neurological deficits.  Denies visual changes.  Denies prior history of migraines or neck/back pain.  Headaches worsened with bright sunlight, improved with sleep.  Last eye clinic evaluation reportedly normal; plans follow-up with " ophthalmology.  Denies urgent care visits post-concussion.    Current Status:  Headache frequency has decreased. Reports ~10 migraine episodes in the past month.  Duration now decreased from 2 hours to ~15 minutes with rizatriptan (Maxalt).  Neck pain and left upper extremity weakness noted post-injury, improving with physical therapy (2-3x/week) and chiropractic care.  Denies numbness or tingling in upper extremities.  Denies significant memory deficits.  No prior history of traumatic brain injury.  Premorbid History: Anxiety and depression.  Denies history of alcohol use disorder.  Denies history of migraine headaches or chronic musculoskeletal complaints.  Reports engaging in brain rest and reducing screen time.    Current Medications:  Amitriptyline (Elavil) 10 mg PO QHS: Taken every other night; ran out a few weeks ago. Never titrated to 20 mg. Reports concerns about potential side effects - denies experiencing any.  Promethazine (Phenergan) 12.5 mg PO q6h PRN  Rizatriptan (Maxalt MLT) 5 mg disintegrating tab PRN: Limited efficacy; patient open to trial of increased dose.  Daily ibuprofen was discontinued by PCP due to concern for rebound headaches.      Review of Systems   Constitutional:  Negative for activity change, appetite change, chills, diaphoresis, fatigue, fever and unexpected weight change.   HENT:  Negative for congestion, dental problem, drooling, ear discharge, ear pain, facial swelling, hearing loss, mouth sores, nosebleeds, postnasal drip, rhinorrhea, sinus pressure, sinus pain, sneezing, sore throat, tinnitus, trouble swallowing and voice change.    Eyes:  Positive for photophobia. Negative for pain, discharge, redness, itching and visual disturbance.   Respiratory:  Negative for cough, chest tightness, shortness of breath and wheezing.    Cardiovascular:  Negative for chest pain, palpitations and leg swelling.   Gastrointestinal:  Positive for nausea. Negative for abdominal distention,  abdominal pain, blood in stool, constipation, diarrhea and vomiting.   Endocrine: Negative for cold intolerance, heat intolerance, polydipsia, polyphagia and polyuria.   Genitourinary:  Negative for decreased urine volume, difficulty urinating, dysuria, flank pain, frequency, hematuria, pelvic pain, urgency and vaginal discharge.   Musculoskeletal:  Negative for arthralgias, back pain, gait problem, joint swelling, myalgias, neck pain and neck stiffness.   Skin:  Negative for color change and rash.   Allergic/Immunologic: Negative for immunocompromised state.   Neurological:  Positive for headaches. Negative for dizziness, tremors, seizures, syncope, facial asymmetry, speech difficulty, weakness, light-headedness and numbness.        Noise sensitivity   Hematological:  Negative for adenopathy. Does not bruise/bleed easily.   Psychiatric/Behavioral:  Negative for agitation, behavioral problems, confusion, decreased concentration, dysphoric mood, hallucinations, self-injury, sleep disturbance and suicidal ideas. The patient is not nervous/anxious and is not hyperactive.    All other systems reviewed and are negative.              Current Medications[1]    Past Medical History:   Diagnosis Date    Asthma     Hypertension affecting pregnancy in third trimester, antepartum 6/30/2016       Past Surgical History:   Procedure Laterality Date    none         Social History[2]      Past/Current Medical/Surgical History, Past/Current Social History, Past/Current Family History and Past/Current Medications were reviewed in detail.    Objective:           VITAL SIGNS WERE REVIEWED      GENERAL APPEARANCE:     The patient looks comfortable.    BMI    No signs of respiratory distress.    Normal breathing pattern.    No dysmorphic features    Normal eye contact.       GENERAL MEDICAL EXAM:    HEENT:  Head is atraumatic normocephalic.     FUNDUSCOPIC (OPHTHALMOSCOPIC) EXAMINATION showed no disc edema (papilledema).      NECK: No  JVD. No visible lesions or goiters.     CHEST-CARDIOPULMONARY: No cyanosis. No tachypnea. Normal respiratory effort.    [id number]: No jaundice. No stomas or lesions. No visible hernias. No catheters.     SKIN, HAIR, NAILS: No pathognomonic skin rash.No neurofibromatosis. No visible lesions.No stigmata of autoimmune disease. No clubbing.    LIMBS: No varicose veins. No visible swelling.    MUSCULOSKELETAL: No visible deformities.No visible lesions.             Neurological Exam  Mental Status  Awake, alert and oriented to person, place and time. Oriented to person, place, time and situation. Recent and remote memory are intact. At 5 minutes recalls 3 of 3 objects. Speech is normal. Language is fluent with no aphasia. Attention and concentration are normal. Fund of knowledge is appropriate for level of education. Apraxia absent.    Cranial Nerves  CN I: Sense of smell is normal.  CN II: Visual acuity is normal. Visual fields full to confrontation. Right funduscopic exam: disc intact. Left funduscopic exam: disc intact.  CN III, IV, VI: Extraocular movements intact bilaterally. Normal lids and orbits bilaterally. Pupils equal round and reactive to light bilaterally.  CN V: Facial sensation is normal.  CN VII: Full and symmetric facial movement.  CN VIII: Hearing is normal.  CN IX, X: Palate elevates symmetrically. Normal gag reflex.  CN XI: Shoulder shrug strength is normal.  CN XII: Tongue midline without atrophy or fasciculations.    Motor  Normal muscle bulk throughout. No fasciculations present. Normal muscle tone. No abnormal involuntary movements. Strength is 5/5 throughout all four extremities.    Sensory  Sensation is intact to light touch, pinprick, vibration and proprioception in all four extremities.  Mild tenderness noted to left occipital area during palpation. .    Reflexes  Deep tendon reflexes are 2+ and symmetric in all four extremities.    Coordination  Right: Finger-to-nose  normal. Rapid alternating movement normal. Heel-to-shin normal.Left: Finger-to-nose normal. Rapid alternating movement normal. Heel-to-shin normal.    Gait  Casual gait is normal including stance, stride, and arm swing.Normal toe walking. Normal heel walking. Normal tandem gait. Romberg is absent. Normal pull test. Able to rise from chair without using arms.        Lab Results   Component Value Date    WBC 4.49 10/09/2024    HGB 15.2 10/09/2024    HCT 43.1 10/09/2024    MCV 88 10/09/2024     10/09/2024       Sodium   Date Value Ref Range Status   10/09/2024 144 136 - 145 mmol/L Final     Potassium   Date Value Ref Range Status   10/09/2024 3.7 3.5 - 5.1 mmol/L Final     Chloride   Date Value Ref Range Status   10/09/2024 111 (H) 95 - 110 mmol/L Final     CO2   Date Value Ref Range Status   10/09/2024 18 (L) 23 - 29 mmol/L Final     Glucose   Date Value Ref Range Status   10/09/2024 104 70 - 110 mg/dL Final     BUN   Date Value Ref Range Status   10/09/2024 10 6 - 20 mg/dL Final     Creatinine   Date Value Ref Range Status   10/09/2024 0.8 0.5 - 1.4 mg/dL Final     Calcium   Date Value Ref Range Status   10/09/2024 9.1 8.7 - 10.5 mg/dL Final     Total Protein   Date Value Ref Range Status   10/09/2024 7.5 6.0 - 8.4 g/dL Final     Albumin   Date Value Ref Range Status   10/09/2024 4.0 3.5 - 5.2 g/dL Final     Total Bilirubin   Date Value Ref Range Status   10/09/2024 0.3 0.1 - 1.0 mg/dL Final     Comment:     For infants and newborns, interpretation of results should be based  on gestational age, weight and in agreement with clinical  observations.    Premature Infant recommended reference ranges:  Up to 24 hours.............<8.0 mg/dL  Up to 48 hours............<12.0 mg/dL  3-5 days..................<15.0 mg/dL  6-29 days.................<15.0 mg/dL       Alkaline Phosphatase   Date Value Ref Range Status   10/09/2024 62 55 - 135 U/L Final     AST   Date Value Ref Range Status   10/09/2024 32 10 - 40 U/L Final  "    ALT   Date Value Ref Range Status   10/09/2024 17 10 - 44 U/L Final     Anion Gap   Date Value Ref Range Status   10/09/2024 15 8 - 16 mmol/L Final     eGFR if    Date Value Ref Range Status   06/28/2019 >60 >60 mL/min/1.73 m^2 Final     eGFR if non    Date Value Ref Range Status   06/28/2019 >60 >60 mL/min/1.73 m^2 Final     Comment:     Calculation used to obtain the estimated glomerular filtration  rate (eGFR) is the CKD-EPI equation.          No results found for: "TVUBIAQL56"    Lab Results   Component Value Date    TSH 0.577 04/25/2024       No results found in the last 24 hours.    No results found in the last 24 hours.    Reviewed the neuroimaging independently       Assessment:   29 y.o. Years old female  with PMH as above came for an evaluation of "Post-concussion headache"    1. Post-concussion headache  Ambulatory referral/consult to Neurology    Homocysteine, Serum    Folate    Vitamin B12    Vitamin B1    Sedimentation rate    C-Reactive Protein    ADRIANO Screen w/Reflex    rizatriptan (MAXALT-MLT) 10 MG disintegrating tablet    amitriptyline (ELAVIL) 10 MG tablet      2. Cervicalgia        3. Nausea             Plan:   Patient Neurological Assessment is remarkable for Mild tenderness noted to left occipital area during palpation.     HEADACHE PLAN    -Patient instructed to keep a headache diary for review at next follow-up visit.     -Brain MRI without contrast will be deferred at this time. The patient denies any neurological symptoms or deficits since the previous imaging. Recent Brain MRI WO, performed in 02/2025, showed no acute abnormalities. The need for further imaging will be reassessed if new symptoms develop. The patient agrees with this plan.      -Order B1 / B-12 / FA / Homocysteine / ADRIANO Screen / HIV / RPR / TSH / Free T-4 / ADRIANO Screen / ESR / CRP / CBC / CMP for Neurologic Evaluation.      -Continue routine ophthalmology evaluation.     -All medication " indications and potential side effects were discussed in detail with the patient. Informational pamphlets were provided for further reference. The patient verbally confirmed full understanding of the medications and their instructions.    -Physical Therapy:  Patient is currently undergoing physical therapy for post-concussion syndrome and cervical musculoskeletal symptoms. Recommend continuation of therapy as clinically indicated.    Medications:  Continue Amitriptyline (Elavil) 10 mg PO QHS: Patient open to resuming nightly use.   Promethazine (Phenergan) 12.5 mg PO q6h PRN for nausea: Continue as needed. No refills needed.   Rizatriptan (Maxalt MLT): Increase from 5 mg to 10 mg disintegrating tablet PRN for acute migraine. Patient reports limited efficacy at current dose and is amenable to trial of increased dose.      1. Post-concussion headache  - Ambulatory referral/consult to Neurology  - Homocysteine, Serum; Future  - Folate; Future  - Vitamin B12; Future  - Vitamin B1; Future  - Sedimentation rate; Future  - C-Reactive Protein; Future  - ADRIANO Screen w/Reflex; Future  - rizatriptan (MAXALT-MLT) 10 MG disintegrating tablet; Take 1 tablet (10 mg total) by mouth as needed for Migraine. (Take 1 tablet by mouth at onset of migraine, can repeat in 2 hours if needed. No more than 2 tabs per day or 3 days/wk )  Dispense: 30 tablet; Refill: 0  - amitriptyline (ELAVIL) 10 MG tablet; Take 1 tablet (10 mg total) by mouth every evening.  Dispense: 30 tablet; Refill: 0    2. Cervicalgia    3. Nausea              LABORATORY EVALUATION    Labs: (2024) Troponin I / CMP / CBC / RPR / HIV / A1C / Lipid Panel / TSH /  -personally reviewed -non-significant abnormalities       RADIOLOGY EVALUATION     Personally Reviewed Brain MRI WO - done 02/2025 - no acute abnormalities / Chronic appearing deformity of the left lamina papyracea and left orbital floor.     Personally Reviewed Head CT WO  - done 12/2024 - report only - no acute  abnormalities    Personally Reviewed CT Cervical Spine Without Contrast   - done 12/2024 - report only -  no acute abnormalities / Straightening and slight reversal of lordosis which could be positional or spasm related.     Personally Reviewed CT Maxillofacial Without Contrast  - done 2023 - Left medial wall and orbital floor fractures with hemorrhage in the left maxillary sinus.         NEUROPHYSIOLOGY EVALUATION       PATHOLOGY EVALUATION        NEUROCOGNITIVE AND NEUROPSYCHOLOGY EVALUATION         Concussion occurs at roughly 90 to 100 g-force, which equates to smashing your skull against a wall at 20 mph.    Cognitive and Physical Rest.    Minimize Stimulation.        If symptoms worsen, new symptoms show up or symptoms persist beyond 2 weeks will get Brain MRI (ASL)    If headache continues beyond 2 weeks start: Amitriptyline/Elavil and monitor for sedation.     If cognitive symptoms continue beyond 2 weeks start: Amantadine 100 mg BID.         RETURN TO SCHOOL/SPORT/EXCERCISE AS LONG AS SYMPTOM FREE     MOST CRITICAL IS THE FIRST 3 MONTHS AND ESPECIALLY FIRST 7-10 DAYS       SCHOOL:     Rest for 7 Days     Half Day School for 3 Days         SPORT/EXERCISE:       Rest for 10 Days     Minimal Aerobic Exercise for 3 Days    Moderate Aerobic Exercise for 3 Days    Non-contact Training for 3 Days     Intermittent Contact for 3 Days    Full Contact            MEDICAL/SURGICAL COMORBIDITIES     All relevant medical comorbidities noted and managed by primary care physician and medical care team.          HEALTHY LIFESTYLE AND PREVENTATIVE CARE    The patient to adhere to the age-appropriate health maintenance guidelines including screening tests and vaccinations. The patient to adhere to  healthy lifestyle, optimal weight, exercise, healthy diet, good sleep hygiene and avoiding drugs including smoking, alcohol and recreational drugs.    I spent a total of 57 minutes on the day of the visit.This includes face to face  time and non-face to face time preparing to see the patient (eg, review of tests), obtaining and/or reviewing separately obtained history, documenting clinical information in the electronic or other health record, independently interpreting results and communicating results to the patient/family/caregiver, or care coordinator.     Please do not hesitate to contact me with any updates, questions or concerns.    No follow-ups on file.    Nan Matamoros, MSN, FNP-C    General Neurology                          [1]   Current Outpatient Medications:     albuterol (VENTOLIN HFA) 90 mcg/actuation inhaler, Inhale 1 puff into the lungs every 6 (six) hours as needed for Wheezing. Rescue (Patient not taking: Reported on 2/21/2025), Disp: 18 g, Rfl: 0    amitriptyline (ELAVIL) 10 MG tablet, Take 1 tablet (10 mg total) by mouth nightly as needed for Insomnia for 7 days, THEN 2 tablets (20 mg total) nightly as needed for Insomnia., Disp: 21 tablet, Rfl: 0    celecoxib (CELEBREX) 100 MG capsule, Take 1 capsule (100 mg total) by mouth once daily., Disp: 30 capsule, Rfl: 1    olopatadine (PATANOL) 0.1 % ophthalmic solution, Place 1 drop into both eyes every evening. (Patient not taking: Reported on 2/21/2025), Disp: 5 mL, Rfl: 0    promethazine (PHENERGAN) 12.5 MG Tab, Take 1 tablet (12.5 mg total) by mouth every 6 (six) hours as needed., Disp: 20 tablet, Rfl: 1    rizatriptan (MAXALT-MLT) 5 MG disintegrating tablet, Take 1 tablet (5 mg total) by mouth as needed for Migraine. May repeat in 2 hours if needed, Disp: 30 tablet, Rfl: 0    umeclidinium-vilanteroL (ANORO ELLIPTA) 62.5-25 mcg/actuation DsDv, Inhale 1 puff into the lungs once daily. Controller (Patient not taking: Reported on 2/21/2025), Disp: 60 each, Rfl: 6  [2]   Social History  Socioeconomic History    Marital status: Single   Tobacco Use    Smoking status: Never    Smokeless tobacco: Never   Substance and Sexual Activity    Alcohol use: Yes     Comment: occ    Drug use:  No    Sexual activity: Yes     Partners: Male     Birth control/protection: None     Social Drivers of Health     Financial Resource Strain: Medium Risk (2/21/2020)    [facility]    Overall Financial Resource Strain (CARDIA)     Difficulty of Paying Living Expenses: Somewhat hard   Food Insecurity: Food Insecurity Present (2/21/2020)    [facility]    Hunger Vital Sign     Worried About Running Out of Food in the Last Year: Often true     Ran Out of Food in the Last Year: Often true   Transportation Needs: No Transportation Needs (2/21/2020)    [facility]    PRAPARE - Transportation     Lack of Transportation (Medical): No     Lack of Transportation (Non-Medical): No   Stress: Stress Concern Present (2/21/2020)    Received from [facility] [facility] and [facility]    [employer] - Occupational Stress Questionnaire     Feeling of Stress : To some extent

## 2025-04-17 NOTE — PROGRESS NOTES
Patient ID: Marcella Duke is a 29 y.o. female.    Chief Complaint: Follow-up    History of Present Illness    CHIEF COMPLAINT:  Ms. Duke presents today for follow-up on asthma and headaches.    ASTHMA:  She experiences asthma attacks 2-4 times per week, with symptoms exacerbating on days with high humidity. She uses albuterol inhaler for emergency relief and reports a previous controller medication was ineffective. Her last pulmonology visit was in childhood.    HEADACHES:  She reports improvement in headache frequency, now experiencing 1-3 headaches per week, decreased from 3-5 per day. The intensity has also decreased. She denies any new symptoms such as nausea or vomiting. She recently saw a neurologist who updated her prescription.    SOCIAL HISTORY:  She denies cigarette and alcohol use. She reports marijuana use.      ROS:  General: -fever, -chills, -fatigue, -weight gain, -weight loss  Eyes: -vision changes, -redness, -discharge  ENT: -ear pain, -nasal congestion, -sore throat  Cardiovascular: -chest pain, -palpitations, -lower extremity edema  Respiratory: -cough, -shortness of breath, +difficulty breathing  Gastrointestinal: -abdominal pain, -nausea, -vomiting, -diarrhea, -constipation, -blood in stool  Genitourinary: -dysuria, -hematuria, -frequency  Musculoskeletal: -joint pain, -muscle pain, +neck pain  Skin: -rash, -lesion  Neurological: +headache, -dizziness, -numbness, -tingling  Psychiatric: -anxiety, -depression, -sleep difficulty         Pmh, Psh, Family Hx, Social Hx updated in Epic Tabs today.       2/4/2025     4:18 PM 12/12/2024    10:19 AM   Depression Patient Health Questionnaire   Over the last two weeks how often have you been bothered by little interest or pleasure in doing things Not at all Not at all   Over the last two weeks how often have you been bothered by feeling down, depressed or hopeless Not at all Not at all   PHQ-2 Total Score 0 0       Active Problem List with  Overview Notes    Diagnosis Date Noted    Cervicalgia 2025    Post-concussion headache 2025    Stiffness of cervical spine 2025    Upper extremity weakness 2025    Cigarette nicotine dependence without complication 2024    Alcohol dependence 2020    Cannabis dependence 2020    SSRI overdose, intentional self-harm, initial encounter 2020     History of suicidal attempt with SSRI overdose in  for which she was hospitalized for 2 weeks.        History of high blood pressure 2019     Last Pregnancy, baby ASA dailt      Asthma 2015     Inhaler PRN         Past Medical History:   Diagnosis Date    Asthma     Hypertension affecting pregnancy in third trimester, antepartum 2016       Past Surgical History:   Procedure Laterality Date    none         Family History   Problem Relation Name Age of Onset    Stroke Paternal Grandmother      Hypertension Maternal Grandmother      Breast cancer Neg Hx      Cancer Neg Hx      Colon cancer Neg Hx      Diabetes Neg Hx      Eclampsia Neg Hx      Miscarriages / Stillbirths Neg Hx      Ovarian cancer Neg Hx       labor Neg Hx         Social History     Socioeconomic History    Marital status: Single   Tobacco Use    Smoking status: Never    Smokeless tobacco: Never   Substance and Sexual Activity    Alcohol use: Yes     Comment: occ    Drug use: No    Sexual activity: Yes     Partners: Male     Birth control/protection: None     Social Drivers of Health     Financial Resource Strain: Medium Risk (2020)    Received from [facility] Subsidiaries and Affiliates    Overall Financial Resource Strain (CARDIA)     Difficulty of Paying Living Expenses: Somewhat hard   Food Insecurity: Food Insecurity Present (2020)    Received from [facility] Subsidi[facility] and Aff[facility]    Hunger Vital Sign     Worried About Running  Out of Food in the Last Year: Often true     Ran Out of Food in the Last Year: Often true   Transportation Needs: No Transportation Needs (2/21/2020)    Received from [facility] Subsidiaries and Affiliates    SUSAN - Transportation     Lack of Transportation (Medical): No     Lack of Transportation (Non-Medical): No   Stress: Stress Concern Present (2/21/2020)    Received from [facility] and Its Subsidiaries and Affiliates    [place] [employer] - Occupational Stress Questionnaire     Feeling of Stress : To some extent       Medications Ordered Prior to Encounter[1]    Review of patient's allergies indicates:   Allergen Reactions    Apple Other (See Comments)     Makes mouth tingle    Banana Other (See Comments)     Makes mouth tingle         Review of Systems    General - Well developed, alert and oriented in NAD  HEENT - normocephalic, no evidence of trauma, sclera white, EOMI  Neck - full range of motion  COR - regular rate and rhythm without murmurs or gallops  Lungs - Clear  Abdomen - soft, non-tender  Ext - no cyanosis or edema    Physical Exam     Assessment:     1. Severe persistent asthma, unspecified whether complicated    2. Cannabis dependence        LABS:   Lab Results   Component Value Date    HGBA1C 4.9 04/25/2024      Lab Results   Component Value Date    CHOL 150 04/25/2024     Lab Results   Component Value Date    LDLCALC 56.8 (L) 04/25/2024     Lab Results   Component Value Date    WBC 4.49 10/09/2024    HGB 15.2 10/09/2024    HCT 43.1 10/09/2024     10/09/2024    CHOL 150 04/25/2024    TRIG 81 04/25/2024    HDL 77 (H) 04/25/2024    ALT 17 10/09/2024    AST 32 10/09/2024     10/09/2024    K 3.7 10/09/2024     (H) 10/09/2024    CREATININE 0.8 10/09/2024    BUN 10 10/09/2024    CO2 18 (L) 10/09/2024    TSH 0.577 04/25/2024    HGBA1C 4.9 04/25/2024       Plan:   Marcella blake  seen today for follow-up.    Diagnoses and all orders for this visit:    Severe persistent asthma, unspecified whether complicated  -     budesonide-formoterol 80-4.5 mcg (SYMBICORT) 80-4.5 mcg/actuation HFAA; Inhale 2 puffs into the lungs 2 (two) times a day. Controller    Cannabis dependence    Other orders  The following orders have not been finalized:  -     Cancel: budesonide-formoterol 160-4.5 mcg (SYMBICORT) 160-4.5 mcg/actuation HFAA        Assessment & Plan    MODERATE PERSISTENT ASTHMA:  - Evaluated asthma control, determining need for controller medication.  - Noted patient experiences asthma attacks 2-4 times per week, triggered by high humidity and pollen.  - Assessed that patient's asthma is not controlled and requires controller medicine.  - Auscultated patient's lungs during physical exam.  - Acknowledged severe asthma and discussed FMLA for asthma-related work absences.  - Emphasized importance of consulting a pulmonologist and using controller medication.  - Considered insurance coverage for asthma medications, attempting to prescribe Symbicort as alternative to previously denied Flovent.  - Will defer FMLA paperwork completion pending asthma control improvement with controller medication.  - Explained difference between controller and rescue inhalers for asthma management.  - Prescribed Symbicort for asthma control, pending insurance coverage.  - Instructed patient to contact the office if unable to obtain Symbicort.  - Referred to pulmonologist for significant asthma management.  - Scheduled follow up in 1 month to reassess asthma symptoms after starting controller medication.    CHRONIC TENSION-TYPE HEADACHE:  - Assessed headache improvement, noting decreased frequency and intensity.  - Ms. Duke reports headaches now occurring 1-3 times per week.  - Evaluated that headache frequency and intensity have decreased, with no new symptoms or associated nausea/vomiting.  - Acknowledged patient's  visit to neurologist for headache management.  - Noted neurologist updated patient's prescription for headache management.    CANNABIS USE:  - Confirmed patient's current cannabis use.  - Educated on negative impact of smoking marijuana on lung health and asthma.  - Advised to avoid smoking cannabis due to its negative impact on lungs and asthma.    PATIENT'S NONCOMPLIANCE WITH MEDICAL TREATMENT:  - Noted patient has not obtained or used prescribed asthma controller medication.  - Observed patient has not consulted the pulmonologist as recommended.  - Acknowledged patient's difficulty in obtaining prescribed medication due to insurance issues.  - Attempted to prescribe Symbicort that might be covered by insurance.  - Instructed patient to contact the office if unable to obtain the prescribed medication.  - Advised to avoid smoking marijuana due to negative impact on lung health and asthma.           Face to Face time with patient:  2:20 PM CDT -      Each patient to whom he or she provides medical services by telemedicine is:  (1) informed of the relationship between the physician and patient and the respective role of any other health care provider with respect to management of the patient; and (2) notified that he or she may decline to receive medical services by telemedicine and may withdraw from such care at any time.    I spent a total of   32    minutes face to face and non-face to face on the date of this visit.This includes time preparing to see the patient (eg, review of tests, notes), obtaining and/or reviewing additional history from an independent historian and/or outside medical records, documenting clinical information in the electronic health record, independently interpreting results and/or communicating results to the patient/family/caregiver, or care coordinator.  Visit today included increased complexity associated with the care of the episodic problem addressed and managing the longitudinal care  of the patient due to the serious and/or complex managed problem(s).    There are no Patient Instructions on file for this visit.    No follow-ups on file.  The ASCVD Risk score (John DELONG, et al., 2019) failed to calculate for the following reasons:    The 2019 ASCVD risk score is only valid for ages 40 to 79    This note was generated with the assistance of ambient listening technology. Verbal consent was obtained by the patient and accompanying visitor(s) for the recording of patient appointment to facilitate this note. I attest to having reviewed and edited the generated note for accuracy, though some syntax or spelling errors may persist. Please contact the author of this note for any clarification.         [1]   Current Outpatient Medications on File Prior to Visit   Medication Sig Dispense Refill    albuterol (VENTOLIN HFA) 90 mcg/actuation inhaler Inhale 1 puff into the lungs every 6 (six) hours as needed for Wheezing. Rescue 18 g 0    amitriptyline (ELAVIL) 10 MG tablet Take 1 tablet (10 mg total) by mouth nightly as needed for Insomnia for 7 days, THEN 2 tablets (20 mg total) nightly as needed for Insomnia. 21 tablet 0    amitriptyline (ELAVIL) 10 MG tablet Take 1 tablet (10 mg total) by mouth every evening. 30 tablet 0    celecoxib (CELEBREX) 100 MG capsule Take 1 capsule (100 mg total) by mouth once daily. 30 capsule 1    olopatadine (PATANOL) 0.1 % ophthalmic solution Place 1 drop into both eyes every evening. 5 mL 0    promethazine (PHENERGAN) 12.5 MG Tab Take 1 tablet (12.5 mg total) by mouth every 6 (six) hours as needed. 20 tablet 1    rizatriptan (MAXALT-MLT) 10 MG disintegrating tablet Take 1 tablet (10 mg total) by mouth as needed for Migraine. (Take 1 tablet by mouth at onset of migraine, can repeat in 2 hours if needed. No more than 2 tabs per day or 3 days/wk ) 30 tablet 0    [DISCONTINUED] umeclidinium-vilanteroL (ANORO ELLIPTA) 62.5-25 mcg/actuation DsDv Inhale 1 puff into the lungs once  daily. Controller 60 each 6    [DISCONTINUED] rizatriptan (MAXALT-MLT) 5 MG disintegrating tablet Take 1 tablet (5 mg total) by mouth as needed for Migraine. May repeat in 2 hours if needed 30 tablet 0     No current facility-administered medications on file prior to visit.

## 2025-04-17 NOTE — PROGRESS NOTES
"  Outpatient Rehab    Physical Therapy Visit     Patient Name: Marcella Duke  MRN: [id number]  YOB: 1995  Encounter Date: 4/17/2025    Therapy Diagnosis:   Encounter Diagnoses   Name Primary?    Cervicalgia Yes    Stiffness of cervical spine     Upper extremity weakness      Physician: Elisa Wyatt MD    Physician Orders: Eval and Treat  Medical Diagnosis: Post-concussion headache  Visit # / Visits Authorized:  11 / 20  Insurance Authorization Period: 2/21/2025 to 12/31/2025  Date of Evaluation: 2/21/2025  Plan of Care Certification:  2/21/2025 to 04/18/2025     PT/PTA: PTA   Number of PTA visits since last PT visit:2  Time In: 0900   Time Out: 0945   Total Time: 45   Total Billable Time:  45    FOTO:  Intake Score:  %  Survey Score 1:  %  Survey Score 2:  %     Subjective   feeling good and would like today to be her last day..         Objective       Cervical Range of Motion    Active (deg) Passive (deg) Pain 4/17/2025   Flexion 55     52   Extension 70     59   Right Lateral Flexion 40     47   Right Rotation 80     68   Left Lateral Flexion 35     53   Left Rotation 80     70           Treatment:  CPT Intervention Performed   Today Duration / Intensity   MT Soft tissue massage    subscapularis, upper trapezial, first rib mobility bilateral, suboccipital release                        TE UBE x  3'/3' FW/BW     Shoulder rolls BW   1 minutes       Upper trapezial stretch  30s/ 3x without over pressure    Levator stretch  3 x 30 seconds with over pressure    Pec Stretch  3 x 30"          Objective Testing  Measurements, FOTO, education              NMR Series 6 x 3 min pectoral stretch, 2 min all other     Chin tucks, Supine with lift x 20 x 5" holds     Prone I's  T's  W's    Y's x  X    x 30 x 5" holds  30 x 5" holds  30 x 5" holds  2 x 10      Sitting cervical rotation  2 min     No moneys  x 3 x 15 RTB         TA Rows x 3 x 10 7.5#     Shoulder Extensions  3 x 10 5#                 PLAN      "     CPT Codes available for Billing:   (00) minutes of Manual therapy (MT) to improve pain and ROM.  (18) minutes of Therapeutic Exercise (TE) to develop strength, endurance, range of motion, and flexibility.  (23) minutes of Neuromuscular Re-Education (NMR)  to improve: Balance, Coordination, Kinesthetic, Sense, Proprioception, and Posture.  (4) minutes of Therapeutic Activities (TA) to improve functional performance.  Vasopneumatic Device Therapy () for management of swelling/edema. (56918)  Unattended Electrical Stimulation (ES) for muscle performance or pain modulation.  BFR: Blood flow restriction applied during exercise    Assessment & Plan   Assessment: pt is able to show an improvement in her shoulder strength and in her princess cervical rom. she has also an improvement in her FOTO score. all objective measurements are compared to 3/28/2025. suspect that she will be able to progress her strenght with being compliant with performance of hep.     Patient will continue to benefit from skilled outpatient physical therapy to address the deficits listed in the problem list box on initial evaluation, provide pt/family education and to maximize pt's level of independence in the home and community environment.     Patient's spiritual, cultural, and educational needs considered and patient agreeable to plan of care and goals.      Plan: pt is discharged form physical therapy. see discharge summary by WINIFRED Sexton DPT for details.    Goals:   Active       Range of Motion       Patient will achieve bilateral cervical side bending ROM 75 degrees (Unable to Meet)       Start:  02/21/25    Expected End:  04/18/25            Patient will achieve bilateral cervical rotation ROM 35 degrees (Met)       Start:  02/21/25    Expected End:  04/18/25    Resolved:  04/17/25            Strength       Patient will achieve bilateral shoulder flexion strength of 4+/5 (Met)       Start:  02/21/25    Expected End:  04/18/25    Resolved:   04/17/25         Patient will achieve bilateral shoulder abduction strength of 4+/5 (Met)       Start:  02/21/25    Expected End:  04/18/25    Resolved:  04/17/25           Resolved       Functional outcome       Patient will show a significant change in FOTO patient-reported outcome tool to goal score to demonstrate subjective improvement (Met)       Start:  02/21/25    Expected End:  04/18/25    Resolved:  04/17/25         Patient will demonstrate independence in home program for support of progression (Met)       Start:  02/21/25    Expected End:  03/21/25    Resolved:  04/17/25            Pain       Patient will report pain of 4/10 demonstrating a reduction of overall pain (Met)       Start:  02/21/25    Expected End:  04/18/25    Resolved:  04/17/25         Patient will report a 2 point reduction in pain. (Met)       Start:  02/21/25    Expected End:  03/21/25    Resolved:  04/17/25           Stephen Dinero, PTA

## 2025-04-24 DIAGNOSIS — G44.309 POST-CONCUSSION HEADACHE: Primary | ICD-10-CM

## 2025-04-24 RX ORDER — CELECOXIB 100 MG/1
100 CAPSULE ORAL DAILY PRN
Qty: 30 CAPSULE | Refills: 1 | Status: SHIPPED | OUTPATIENT
Start: 2025-04-24

## 2025-04-24 NOTE — TELEPHONE ENCOUNTER
No care due was identified.  [facility] Embedded Care Due Messages. Reference number: [id number].   4/24/2025 5:06:10 AM CDT

## 2025-05-20 ENCOUNTER — DOCUMENTATION ONLY (OUTPATIENT)
Dept: REHABILITATION | Facility: HOSPITAL | Age: 30
End: 2025-05-20
Payer: COMMERCIAL

## 2025-05-20 NOTE — PROGRESS NOTES
[facility]  Physical Therapy Discharge Note    Name: Marcella [facility] Number: [id number]  Therapy Diagnosis:        Encounter Diagnoses   Name Primary?    Cervicalgia Yes    Stiffness of cervical spine      Upper extremity weakness        Physician: Elisa Wyatt MD     Physician Orders: Eval and Treat  Medical Diagnosis: Post-concussion headache    Date of Evaluation: 2/21/2025      Date of Last visit: 4/17/2025  Total Visits Received: 11    ASSESSMENT      See daily note    Discharge reason: Patient has not attended therapy since 4/17/2025    Discharge FOTO Score: see daily note    Goals: see daily note    PLAN   This patient is discharged from Physical Therapy      Oli Sexton, PT

## 2025-07-09 ENCOUNTER — TELEPHONE (OUTPATIENT)
Dept: NEUROLOGY | Facility: CLINIC | Age: 30
End: 2025-07-09
Payer: COMMERCIAL

## 2025-08-07 ENCOUNTER — LAB VISIT (OUTPATIENT)
Dept: LAB | Facility: HOSPITAL | Age: 30
End: 2025-08-07
Payer: COMMERCIAL

## 2025-08-07 ENCOUNTER — OFFICE VISIT (OUTPATIENT)
Dept: NEUROLOGY | Facility: CLINIC | Age: 30
End: 2025-08-07
Payer: COMMERCIAL

## 2025-08-07 VITALS
RESPIRATION RATE: 16 BRPM | HEART RATE: 64 BPM | BODY MASS INDEX: 27.22 KG/M2 | OXYGEN SATURATION: 99 % | DIASTOLIC BLOOD PRESSURE: 84 MMHG | WEIGHT: 163.38 LBS | HEIGHT: 65 IN | SYSTOLIC BLOOD PRESSURE: 123 MMHG

## 2025-08-07 DIAGNOSIS — M54.50 ACUTE MIDLINE LOW BACK PAIN WITHOUT SCIATICA: ICD-10-CM

## 2025-08-07 DIAGNOSIS — G44.309 POST-CONCUSSION HEADACHE: ICD-10-CM
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
## 2025-08-07 DIAGNOSIS — G44.309 POST-CONCUSSION HEADACHE: Primary | ICD-10-CM

## 2025-08-07 DIAGNOSIS — M54.2 CERVICALGIA: ICD-10-CM
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
## 2025-08-07 DIAGNOSIS — R11.0 NAUSEA: ICD-10-CM

## 2025-08-07 LAB
(HCYS)2 SERPL-MCNC: 12.8 UMOL/L (ref 4–15.5)
CRP SERPL-MCNC: 2.4 MG/L
ERYTHROCYTE [SEDIMENTATION RATE] IN BLOOD BY PHOTOMETRIC METHOD: 7 MM/HR
FOLATE SERPL-MCNC: 4.8 NG/ML (ref 4–24)
VIT B12 SERPL-MCNC: 272 PG/ML (ref 210–950)

## 2025-08-07 PROCEDURE — 99999 PR PBB SHADOW E&M-EST. PATIENT-LVL IV: CPT | Mod: PBBFAC,,,

## 2025-08-07 PROCEDURE — 84425 ASSAY OF VITAMIN B-1: CPT

## 2025-08-07 PROCEDURE — 82746 ASSAY OF FOLIC ACID SERUM: CPT

## 2025-08-07 PROCEDURE — 86038 ANTINUCLEAR ANTIBODIES: CPT

## 2025-08-07 PROCEDURE — 3079F DIAST BP 80-89 MM HG: CPT | Mod: CPTII,S$GLB,,

## 2025-08-07 PROCEDURE — 1159F MED LIST DOCD IN RCRD: CPT | Mod: CPTII,S$GLB,,

## 2025-08-07 PROCEDURE — 85652 RBC SED RATE AUTOMATED: CPT

## 2025-08-07 PROCEDURE — 3008F BODY MASS INDEX DOCD: CPT | Mod: CPTII,S$GLB,,

## 2025-08-07 PROCEDURE — 36415 COLL VENOUS BLD VENIPUNCTURE: CPT | Mod: PO

## 2025-08-07 PROCEDURE — 3074F SYST BP LT 130 MM HG: CPT | Mod: CPTII,S$GLB,,

## 2025-08-07 PROCEDURE — 86140 C-REACTIVE PROTEIN: CPT

## 2025-08-07 PROCEDURE — 82607 VITAMIN B-12: CPT

## 2025-08-07 PROCEDURE — 99214 OFFICE O/P EST MOD 30 MIN: CPT | Mod: S$GLB,,,

## 2025-08-07 PROCEDURE — 1160F RVW MEDS BY RX/DR IN RCRD: CPT | Mod: CPTII,S$GLB,,

## 2025-08-07 PROCEDURE — 83090 ASSAY OF HOMOCYSTEINE: CPT

## 2025-08-07 RX ORDER — MELOXICAM 15 MG/1
15 TABLET ORAL
COMMUNITY
Start: 2025-07-31

## 2025-08-07 RX ORDER — RIZATRIPTAN BENZOATE 10 MG/1
10 TABLET, ORALLY DISINTEGRATING ORAL DAILY PRN
Qty: 9 TABLET | Refills: 2 | Status: SHIPPED | OUTPATIENT
Start: 2025-08-07 | End: 2025-09-06

## 2025-08-07 RX ORDER — PROMETHAZINE HYDROCHLORIDE 12.5 MG/1
12.5 TABLET ORAL EVERY 6 HOURS PRN
Qty: 20 TABLET | Refills: 1 | Status: SHIPPED | OUTPATIENT
Start: 2025-08-07 | End: 2025-09-06

## 2025-08-07 RX ORDER — AMITRIPTYLINE HYDROCHLORIDE 10 MG/1
10 TABLET, FILM COATED ORAL NIGHTLY
Qty: 90 TABLET | Refills: 0 | Status: SHIPPED | OUTPATIENT
Start: 2025-08-07 | End: 2025-11-05

## 2025-08-07 NOTE — PROGRESS NOTES
"Subjective:       Patient ID: Marcella Duke is a 29 y.o. female.      Chief Complaint: "Post-concussion headache"        HPI    HPI 29 y.o.  Years old female   with PMHx of  has a past medical history of Asthma and Hypertension affecting pregnancy in third trimester, antepartum.  and other medical conditions came with daughter for the follow-up evaluation and recommendation of "Post-concussion headache"    Interval History: (04/2025) - Continued Amitriptyline (Elavil) 10 mg PO QHS / Promethazine (Phenergan) 12.5 mg PO q6h PRN / Increased Maxalt from 5 to 10 mg PO Daily PRN       HPI:  The patient sustained a head injury on 12/9/24 secondary to a slip and fall, striking the occipital region. She was evaluated at [facility] - Emergency Department on the day of injury.  Initial Imaging:  CT Head without Contrast: No acute intracranial abnormalities.  CT Cervical Spine without Contrast:  No evidence of acute bony injury.  Straightening and slight reversal of cervical lordosis, possibly positional or due to paraspinal muscle spasm.  Symptoms at Time of Injury:  No loss of consciousness.  No seizures.  No open wounds or skull fracture.  No visual or auditory loss.  Brief episode of transient visual blackout (<5 seconds), resolved spontaneously.  No focal motor deficits, paralysis, or weakness.  Reported vomiting at the time of injury and throughout the day (resolved).  Dizziness noted acutely (now resolved).     Post-Injury Course:  Development of daily, severe, throbbing headaches rated 10/10 initially.  Headache characteristics: band-like distribution, generalized, occasionally originating bilaterally in occipital region; pressure-like quality; severity varies between 5-10/10.  Associated symptoms: photophobia, phonophobia, nausea.  No associated neurological deficits.  Denies visual changes.  Denies prior history of migraines or neck/back pain.  Headaches worsened with bright " sunlight, improved with sleep.  Last eye clinic evaluation reportedly normal; plans follow-up with ophthalmology.  Denies urgent care visits post-concussion.    Current Status:  Headache frequency has decreased. Reports ~10 migraine episodes in the past month.  Duration now decreased from 2 hours to ~15 minutes with rizatriptan (Maxalt).  Neck pain and left upper extremity weakness noted post-injury, improving with physical therapy (2-3x/week) and chiropractic care.  Denies numbness or tingling in upper extremities.  Denies significant memory deficits.  No prior history of traumatic brain injury.  Premorbid History: Anxiety and depression.  Denies history of alcohol use disorder.  Denies history of migraine headaches or chronic musculoskeletal complaints.  Reports engaging in brain rest and reducing screen time.    Current Medications:  Amitriptyline (Elavil) 10 mg PO QHS: Taken every other night; ran out a few weeks ago. Never titrated to 20 mg. Reports concerns about potential side effects - denies experiencing any.  Promethazine (Phenergan) 12.5 mg PO q6h PRN  Rizatriptan (Maxalt MLT) 5 mg disintegrating tab PRN: Limited efficacy; patient open to trial of increased dose.  Daily ibuprofen was discontinued by PCP due to concern for rebound headaches.          New Issues: (08/07/2025) -     No new issues reported per patient.    History of Present Illness  Chief Complaint:  Marcella presents today for follow-up of headaches.    Headaches:  The patient reports significant improvement in headache frequency and severity. No new changes in headache characteristics (refer to previous assessment for details). Headaches now occur approximately 1-2 times per week, occasionally up to 3 times per week. Associated symptoms include lightheadedness described as a mild swaying sensation (not spinning) and mild abdominal bloating. Headaches are exacerbated by loud noises, stress, and heat exposure, and improve with rest/sleep.  She  had been taking amitriptyline (Elavil) 10 mg nightly with approximately 80% improvement in symptoms. However, she recently ran out of this medication one week ago and has noticed a return in headache frequency. She denies vision changes, focal neurological deficits (e.g., weakness, numbness), stroke-like symptoms, seizure activity, or syncope. She does acknowledge that stress and anxiety may contribute to her headaches.    Recent Fall:  Approximately one month ago, the patient tripped on stairs at home and fell, landing on her lower back and buttocks. She denies any trauma to the head or neck, loss of consciousness, or subsequent falls. She reports bilateral lower back pain that is non-radiating and has not required treatment. She is not taking any medications for the pain and denies weakness, numbness, bowel or bladder incontinence. She declined imaging or further work up at this time.     Medications:  Amitriptyline (Elavil) 10 mg PO QHS - previously effective, recently ran out  Promethazine (Phenergan) 12.5 mg PO q6h PRN - used for nausea, effective, no side effects  Maxalt 10 mg PO daily PRN - tolerated well without side effects  Mobic - prescribed for foot pain but not picked up    Additional Notes:  FMLA forms requested by patient for completion.  Eye clinic evaluation pending - patient plans to reschedule.    Review of Systems   Constitutional:  Negative for activity change, appetite change, chills, diaphoresis, fatigue, fever and unexpected weight change.   HENT:  Negative for congestion, dental problem, drooling, ear discharge, ear pain, facial swelling, hearing loss, mouth sores, nosebleeds, postnasal drip, rhinorrhea, sinus pressure, sinus pain, sneezing, sore throat, tinnitus, trouble swallowing and voice change.    Eyes:  Positive for photophobia. Negative for pain, discharge, redness, itching and visual disturbance.   Respiratory:  Negative for cough, chest tightness, shortness of breath and wheezing.     Cardiovascular:  Negative for chest pain, palpitations and leg swelling.   Gastrointestinal:  Positive for nausea. Negative for abdominal distention, abdominal pain, blood in stool, constipation, diarrhea and vomiting.   Endocrine: Negative for cold intolerance, heat intolerance, polydipsia, polyphagia and polyuria.   Genitourinary:  Negative for decreased urine volume, difficulty urinating, dysuria, flank pain, frequency, hematuria, pelvic pain, urgency and vaginal discharge.   Musculoskeletal:  Positive for back pain. Negative for arthralgias, gait problem, joint swelling, myalgias, neck pain and neck stiffness.   Skin:  Negative for color change and rash.   Allergic/Immunologic: Negative for immunocompromised state.   Neurological:  Positive for light-headedness and headaches. Negative for dizziness, tremors, seizures, syncope, facial asymmetry, speech difficulty, weakness and numbness.        Noise sensitivity   Hematological:  Negative for adenopathy. Does not bruise/bleed easily.   Psychiatric/Behavioral:  Negative for agitation, behavioral problems, confusion, decreased concentration, dysphoric mood, hallucinations, self-injury, sleep disturbance and suicidal ideas. The patient is not nervous/anxious and is not hyperactive.    All other systems reviewed and are negative.              Current Medications[1]    Past Medical History:   Diagnosis Date    Asthma     Hypertension affecting pregnancy in third trimester, antepartum 6/30/2016       Past Surgical History:   Procedure Laterality Date    none         Social History[2]      Past/Current Medical/Surgical History, Past/Current Social History, Past/Current Family History and Past/Current Medications were reviewed in detail.    Objective:           VITAL SIGNS WERE REVIEWED      GENERAL APPEARANCE:     The patient looks comfortable.    BMI    No signs of respiratory distress.    Normal breathing pattern.    No dysmorphic features    Normal eye contact.        GENERAL MEDICAL EXAM:    HEENT:  Head is atraumatic normocephalic.     FUNDUSCOPIC (OPHTHALMOSCOPIC) EXAMINATION showed no disc edema (papilledema).      NECK: No JVD. No visible lesions or goiters.     CHEST-CARDIOPULMONARY: No cyanosis. No tachypnea. Normal respiratory effort.    IVRMWEM-ITRYNRVKGRURBBKE-GZHOGIEPOX: No jaundice. No stomas or lesions. No visible hernias. No catheters.     SKIN, HAIR, NAILS: No pathognomonic skin rash.No neurofibromatosis. No visible lesions.No stigmata of autoimmune disease. No clubbing.    LIMBS: No varicose veins. No visible swelling.    MUSCULOSKELETAL: No visible deformities.No visible lesions.             Neurological Exam  Mental Status  Awake, alert and oriented to person, place and time. Oriented to person, place, time and situation. Recent and remote memory are intact. At 5 minutes recalls 3 of 3 objects. Speech is normal. Language is fluent with no aphasia. Attention and concentration are normal. Fund of knowledge is appropriate for level of education. Apraxia absent.    Cranial Nerves  CN I: Sense of smell is normal.  CN II: Visual acuity is normal. Visual fields full to confrontation. Right funduscopic exam: disc intact. Left funduscopic exam: disc intact.  CN III, IV, VI: Extraocular movements intact bilaterally. Normal lids and orbits bilaterally. Pupils equal round and reactive to light bilaterally.  CN V: Facial sensation is normal.  CN VII: Full and symmetric facial movement.  CN VIII: Hearing is normal.  CN IX, X: Palate elevates symmetrically. Normal gag reflex.  CN XI: Shoulder shrug strength is normal.  CN XII: Tongue midline without atrophy or fasciculations.    Motor  Normal muscle bulk throughout. No fasciculations present. Normal muscle tone. No abnormal involuntary movements. Strength is 5/5 throughout all four extremities.    Sensory  Sensation is intact to light touch, pinprick, vibration and proprioception in all four extremities.  Mild tenderness  noted to left occipital area during palpation. .    Reflexes  Deep tendon reflexes are 2+ and symmetric in all four extremities.    Coordination  Right: Finger-to-nose normal. Rapid alternating movement normal. Heel-to-shin normal.Left: Finger-to-nose normal. Rapid alternating movement normal. Heel-to-shin normal.    Gait  Casual gait is normal including stance, stride, and arm swing.Normal toe walking. Normal heel walking. Normal tandem gait. Romberg is absent. Normal pull test. Able to rise from chair without using arms.        Lab Results   Component Value Date    WBC 4.49 10/09/2024    HGB 15.2 10/09/2024    HCT 43.1 10/09/2024    MCV 88 10/09/2024     10/09/2024       Sodium   Date Value Ref Range Status   10/09/2024 144 136 - 145 mmol/L Final     Potassium   Date Value Ref Range Status   10/09/2024 3.7 3.5 - 5.1 mmol/L Final     Chloride   Date Value Ref Range Status   10/09/2024 111 (H) 95 - 110 mmol/L Final     CO2   Date Value Ref Range Status   10/09/2024 18 (L) 23 - 29 mmol/L Final     Glucose   Date Value Ref Range Status   10/09/2024 104 70 - 110 mg/dL Final     BUN   Date Value Ref Range Status   10/09/2024 10 6 - 20 mg/dL Final     Creatinine   Date Value Ref Range Status   10/09/2024 0.8 0.5 - 1.4 mg/dL Final     Calcium   Date Value Ref Range Status   10/09/2024 9.1 8.7 - 10.5 mg/dL Final     Total Protein   Date Value Ref Range Status   10/09/2024 7.5 6.0 - 8.4 g/dL Final     Albumin   Date Value Ref Range Status   10/09/2024 4.0 3.5 - 5.2 g/dL Final     Total Bilirubin   Date Value Ref Range Status   10/09/2024 0.3 0.1 - 1.0 mg/dL Final     Comment:     For infants and newborns, interpretation of results should be based  on gestational age, weight and in agreement with clinical  observations.    Premature Infant recommended reference ranges:  Up to 24 hours.............<8.0 mg/dL  Up to 48 hours............<12.0 mg/dL  3-5 days..................<15.0 mg/dL  6-29 days.................<15.0  "mg/dL       Alkaline Phosphatase   Date Value Ref Range Status   10/09/2024 62 55 - 135 U/L Final     AST   Date Value Ref Range Status   10/09/2024 32 10 - 40 U/L Final     ALT   Date Value Ref Range Status   10/09/2024 17 10 - 44 U/L Final     Anion Gap   Date Value Ref Range Status   10/09/2024 15 8 - 16 mmol/L Final     eGFR if    Date Value Ref Range Status   06/28/2019 >60 >60 mL/min/1.73 m^2 Final     eGFR if non    Date Value Ref Range Status   06/28/2019 >60 >60 mL/min/1.73 m^2 Final     Comment:     Calculation used to obtain the estimated glomerular filtration  rate (eGFR) is the CKD-EPI equation.          No results found for: "[id number]"    Lab Results   Component Value Date    TSH 0.577 04/25/2024       No results found in the last 24 hours.    No results found in the last 24 hours.    Reviewed the neuroimaging independently       Assessment:   29 y.o. Years old female  with PMH as above came for an evaluation of "Post-concussion headache"    1. Post-concussion headache  amitriptyline (ELAVIL) 10 MG tablet    rizatriptan (MAXALT-MLT) 10 MG disintegrating tablet      2. Cervicalgia        3. Nausea  promethazine (PHENERGAN) 12.5 MG Tab      4. Fall at home, initial encounter        5. Acute midline low back pain without sciatica             Plan:   Patient Neurological Assessment is remarkable for Mild tenderness noted to left occipital area during palpation.     Assessment & Plan    G44.309 Post-concussion headache  M54.2 Cervicalgia  R11.0 Nausea    IMPRESSION:  - Assessed headache improvement with current medication regimen showing 80% improvement.  - Determined no need for new brain imaging due to lack of symptom changes since February. Patient agrees.       POST-CONCUSSION HEADACHE:   Continued amitriptyline at current dose for headache prevention.   Continued rizatriptan as needed for acute headache treatment.   Ordered labs to investigate causes of headaches, " including vitamin levels and inflammatory markers.   Marcella to resume exercises with resistance bands provided by physical therapist for post-concussion syndrome management.    NAUSEA:   Continued promethazine 12.5 mg every 6 hours as needed for nausea/vomiting.            HEADACHE PLAN    -Patient instructed to keep a headache diary for review at next follow-up visit.     -Brain MRI without contrast will be deferred at this time. The patient denies any neurological symptoms or deficits since the previous imaging. Recent Brain MRI WO, performed in 02/2025, showed no acute abnormalities. The need for further imaging will be reassessed if new symptoms develop. The patient agrees with this plan.      -Continue obtaining  B1 / B-12 / FA / Homocysteine / ADRIANO Screen / HIV / RPR / TSH / Free T-4 / ADRIANO Screen / ESR / CRP / CBC / CMP for Neurologic Evaluation.      -Continue routine ophthalmology evaluation.     Back Pain: She declined imaging or further work up at this time. Fall precautions discussed.     -All medication indications and potential side effects were discussed in detail with the patient. Informational pamphlets were provided for further reference. The patient verbally confirmed full understanding of the medications and their instructions.    -Physical Therapy:  Patient completed and declined re-initiation of referral at this time    Medications:  Continue Amitriptyline (Elavil) 10 mg PO QHS: Patient open to resuming nightly use.   Promethazine (Phenergan) 12.5 mg PO q6h PRN for nausea: Continue as needed. No refills needed.   Rizatriptan (Maxalt MLT): Increase from 5 mg to 10 mg disintegrating tablet PRN for acute migraine. Patient reports limited efficacy at current dose and is amenable to trial of increased dose.      1. Post-concussion headache  - amitriptyline (ELAVIL) 10 MG tablet; Take 1 tablet (10 mg total) by mouth every evening.  Dispense: 90 tablet; Refill: 0  - rizatriptan (MAXALT-MLT) 10 MG  disintegrating tablet; Take 1 tablet (10 mg total) by mouth daily as needed for Migraine. (Take 1 tablet by mouth at onset of migraine, can repeat in 2 hours if needed. No more than 2 tabs per day or 3 days/wk )  Dispense: 9 tablet; Refill: 2    2. Cervicalgia    3. Nausea  - promethazine (PHENERGAN) 12.5 MG Tab; Take 1 tablet (12.5 mg total) by mouth every 6 (six) hours as needed (Nausea or Vomiting).  Dispense: 20 tablet; Refill: 1    4. Fall at home, initial encounter    5. Acute midline low back pain without sciatica                 LABORATORY EVALUATION    Labs: (2024) Troponin I / CMP / CBC / RPR / HIV / A1C / Lipid Panel / TSH /  -personally reviewed -non-significant abnormalities       RADIOLOGY EVALUATION     Personally Reviewed Brain MRI WO - done 02/2025 - no acute abnormalities / Chronic appearing deformity of the left lamina papyracea and left orbital floor.     Personally Reviewed Head CT WO  - done 12/2024 - report only - no acute abnormalities    Personally Reviewed CT Cervical Spine Without Contrast   - done 12/2024 - report only -  no acute abnormalities / Straightening and slight reversal of lordosis which could be positional or spasm related.     Personally Reviewed CT Maxillofacial Without Contrast  - done 2023 - Left medial wall and orbital floor fractures with hemorrhage in the left maxillary sinus.         NEUROPHYSIOLOGY EVALUATION       PATHOLOGY EVALUATION        NEUROCOGNITIVE AND NEUROPSYCHOLOGY EVALUATION         Concussion occurs at roughly 90 to 100 g-force, which equates to smashing your skull against a wall at 20 mph.    Cognitive and Physical Rest.    Minimize Stimulation.        If symptoms worsen, new symptoms show up or symptoms persist beyond 2 weeks will get Brain MRI (ASL)    If headache continues beyond 2 weeks start: Amitriptyline/Elavil and monitor for sedation.     If cognitive symptoms continue beyond 2 weeks start: Amantadine 100 mg BID.         RETURN TO  SCHOOL/SPORT/EXCERCISE AS LONG AS SYMPTOM FREE     MOST CRITICAL IS THE FIRST 3 MONTHS AND ESPECIALLY FIRST 7-10 DAYS       SCHOOL:     Rest for 7 Days     Half Day School for 3 Days         SPORT/EXERCISE:       Rest for 10 Days     Minimal Aerobic Exercise for 3 Days    Moderate Aerobic Exercise for 3 Days    Non-contact Training for 3 Days     Intermittent Contact for 3 Days    Full Contact            MEDICAL/SURGICAL COMORBIDITIES     All relevant medical comorbidities noted and managed by primary care physician and medical care team.          HEALTHY LIFESTYLE AND PREVENTATIVE CARE    The patient to adhere to the age-appropriate health maintenance guidelines including screening tests and vaccinations. The patient to adhere to  healthy lifestyle, optimal weight, exercise, healthy diet, good sleep hygiene and avoiding drugs including smoking, alcohol and recreational drugs.    I spent a total of 37 minutes on the day of the visit.This includes face to face time and non-face to face time preparing to see the patient (eg, review of tests), obtaining and/or reviewing separately obtained history, documenting clinical information in the electronic or other health record, independently interpreting results and communicating results to the patient/family/caregiver, or care coordinator.     This note was generated with the assistance of ambient listening technology. Verbal consent was obtained by the patient and accompanying visitor(s) for the recording of patient appointment to facilitate this note. I attest to having reviewed and edited the generated note for accuracy, though some syntax or spelling errors may persist. Please contact the author of this note for any clarification.         Please do not hesitate to contact me with any updates, questions or concerns.    No follow-ups on file.    Nan Matamoros, MSN, FNP-C    General Neurology                            [1]   Current Outpatient Medications:     albuterol  (VENTOLIN HFA) 90 mcg/actuation inhaler, Inhale 1 puff into the lungs every 6 (six) hours as needed for Wheezing. Rescue, Disp: 18 g, Rfl: 0    amitriptyline (ELAVIL) 10 MG tablet, Take 1 tablet (10 mg total) by mouth every evening., Disp: 30 tablet, Rfl: 0    budesonide-formoterol 80-4.5 mcg (SYMBICORT) 80-4.5 mcg/actuation HFAA, Inhale 2 puffs into the lungs 2 (two) times a day. Controller, Disp: 10.2 g, Rfl: 0    celecoxib (CELEBREX) 100 MG capsule, Take 1 capsule (100 mg total) by mouth daily as needed for Pain., Disp: 30 capsule, Rfl: 1    olopatadine (PATANOL) 0.1 % ophthalmic solution, Place 1 drop into both eyes every evening., Disp: 5 mL, Rfl: 0    promethazine (PHENERGAN) 12.5 MG Tab, Take 1 tablet (12.5 mg total) by mouth every 6 (six) hours as needed., Disp: 20 tablet, Rfl: 1    rizatriptan (MAXALT-MLT) 10 MG disintegrating tablet, Take 1 tablet (10 mg total) by mouth as needed for Migraine. (Take 1 tablet by mouth at onset of migraine, can repeat in 2 hours if needed. No more than 2 tabs per day or 3 days/wk ), Disp: 30 tablet, Rfl: 0  [2]   Social History  Socioeconomic History    Marital status: Single   Tobacco Use    Smoking status: Never    Smokeless tobacco: Never   Substance and Sexual Activity    Alcohol use: Yes     Comment: occ    Drug use: No    Sexual activity: Yes     Partners: Male     Birth control/protection: None     Social Drivers of Health     Financial Resource Strain: Medium Risk (2/21/2020)    Received from [facility] and Its Subsidiaries and Affiliates    Overall Financial Resource Strain (CARDIA)     Difficulty of Paying Living Expenses: Somewhat hard   Food Insecurity: Food Insecurity Present (2/21/2020)    Received from [facility] and Its Subsidiaries and Affiliates    Hunger Vital Sign     Worried About Running Out of Food in the Last Year: Often true     Ran Out of Food in the Last Year: Often true    Transportation Needs: No Transportation Needs (2/21/2020)    Received from [facility] and Its Subsidiaries and Affiliates    PRAPARE - Transportation     Lack of Transportation (Medical): No     Lack of Transportation (Non-Medical): No   Stress: Stress Concern Present (2/21/2020)    Received from [facility] and Its Subsidiaries and Affiliates    [employer] - Occupational Stress Questionnaire     Feeling of Stress : To some extent

## 2025-08-08 ENCOUNTER — PATIENT MESSAGE (OUTPATIENT)
Dept: NEUROLOGY | Facility: CLINIC | Age: 30
End: 2025-08-08
Payer: COMMERCIAL

## 2025-08-08 LAB — ANA (OHS): NORMAL

## 2025-08-13 LAB — W VITAMIN B1: 39 UG/L
